# Patient Record
Sex: FEMALE | Race: WHITE | NOT HISPANIC OR LATINO | Employment: OTHER | URBAN - METROPOLITAN AREA
[De-identification: names, ages, dates, MRNs, and addresses within clinical notes are randomized per-mention and may not be internally consistent; named-entity substitution may affect disease eponyms.]

---

## 2017-03-03 ENCOUNTER — TRANSCRIBE ORDERS (OUTPATIENT)
Dept: ADMINISTRATIVE | Facility: HOSPITAL | Age: 62
End: 2017-03-03

## 2017-03-03 DIAGNOSIS — Z12.31 OTHER SCREENING MAMMOGRAM: Primary | ICD-10-CM

## 2017-03-07 ENCOUNTER — HOSPITAL ENCOUNTER (OUTPATIENT)
Dept: MAMMOGRAPHY | Facility: HOSPITAL | Age: 62
Discharge: HOME | End: 2017-03-07
Payer: COMMERCIAL

## 2017-03-07 DIAGNOSIS — Z12.31 OTHER SCREENING MAMMOGRAM: ICD-10-CM

## 2017-03-07 PROCEDURE — G0202 SCR MAMMO BI INCL CAD: HCPCS

## 2017-03-07 PROCEDURE — 77063 BREAST TOMOSYNTHESIS BI: CPT

## 2017-03-09 ENCOUNTER — HOSPITAL ENCOUNTER (OUTPATIENT)
Dept: MAMMOGRAPHY | Facility: HOSPITAL | Age: 62
Discharge: HOME | End: 2017-03-09
Payer: COMMERCIAL

## 2017-03-09 DIAGNOSIS — R92.0 ABNORMAL FINDING ON MAMMOGRAPHY, MICROCALCIFICATION: ICD-10-CM

## 2017-03-09 PROCEDURE — G0206 DX MAMMO INCL CAD UNI: HCPCS

## 2017-04-03 ENCOUNTER — TRANSCRIBE ORDERS (OUTPATIENT)
Dept: ADMINISTRATIVE | Facility: HOSPITAL | Age: 62
End: 2017-04-03

## 2017-04-03 ENCOUNTER — APPOINTMENT (OUTPATIENT)
Dept: LAB | Facility: HOSPITAL | Age: 62
End: 2017-04-03
Payer: COMMERCIAL

## 2017-04-03 DIAGNOSIS — I10 UNSPECIFIED ESSENTIAL HYPERTENSION: ICD-10-CM

## 2017-04-03 DIAGNOSIS — E03.9 UNSPECIFIED HYPOTHYROIDISM: ICD-10-CM

## 2017-04-03 DIAGNOSIS — E11.9 DIABETES MELLITUS WITHOUT COMPLICATION (HCC): ICD-10-CM

## 2017-04-03 DIAGNOSIS — E11.9 DIABETES MELLITUS WITHOUT COMPLICATION (HCC): Primary | ICD-10-CM

## 2017-04-03 DIAGNOSIS — E78.5 OTHER AND UNSPECIFIED HYPERLIPIDEMIA: ICD-10-CM

## 2017-04-03 DIAGNOSIS — E55.9 UNSPECIFIED VITAMIN D DEFICIENCY: ICD-10-CM

## 2017-04-03 LAB
25(OH)D3 SERPL-MCNC: 41.5 NG/ML (ref 30–100)
ALBUMIN SERPL BCP-MCNC: 3.6 G/DL (ref 3.5–5)
ALP SERPL-CCNC: 60 U/L (ref 46–116)
ALT SERPL W P-5'-P-CCNC: 28 U/L (ref 12–78)
ANION GAP SERPL CALCULATED.3IONS-SCNC: 9 MMOL/L (ref 4–13)
AST SERPL W P-5'-P-CCNC: 18 U/L (ref 5–45)
BASOPHILS # BLD AUTO: 0.01 THOUSANDS/ΜL (ref 0–0.1)
BASOPHILS NFR BLD AUTO: 0 % (ref 0–1)
BILIRUB SERPL-MCNC: 0.9 MG/DL (ref 0.2–1)
BUN SERPL-MCNC: 14 MG/DL (ref 5–25)
CALCIUM SERPL-MCNC: 8.8 MG/DL (ref 8.3–10.1)
CHLORIDE SERPL-SCNC: 107 MMOL/L (ref 100–108)
CHOLEST SERPL-MCNC: 195 MG/DL (ref 50–200)
CO2 SERPL-SCNC: 29 MMOL/L (ref 21–32)
CREAT SERPL-MCNC: 0.93 MG/DL (ref 0.6–1.3)
EOSINOPHIL # BLD AUTO: 0.19 THOUSAND/ΜL (ref 0–0.61)
EOSINOPHIL NFR BLD AUTO: 3 % (ref 0–6)
ERYTHROCYTE [DISTWIDTH] IN BLOOD BY AUTOMATED COUNT: 13.1 % (ref 11.6–15.1)
EST. AVERAGE GLUCOSE BLD GHB EST-MCNC: 131 MG/DL
GFR SERPL CREATININE-BSD FRML MDRD: >60 ML/MIN/1.73SQ M
GLUCOSE P FAST SERPL-MCNC: 106 MG/DL (ref 65–99)
HBA1C MFR BLD: 6.2 % (ref 4.2–6.3)
HCT VFR BLD AUTO: 41.8 % (ref 34.8–46.1)
HGB BLD-MCNC: 13.4 G/DL (ref 11.5–15.4)
LDLC SERPL DIRECT ASSAY-MCNC: 100 MG/DL (ref 0–100)
LYMPHOCYTES # BLD AUTO: 1.7 THOUSANDS/ΜL (ref 0.6–4.47)
LYMPHOCYTES NFR BLD AUTO: 29 % (ref 14–44)
MCH RBC QN AUTO: 30.1 PG (ref 26.8–34.3)
MCHC RBC AUTO-ENTMCNC: 32.1 G/DL (ref 31.4–37.4)
MCV RBC AUTO: 94 FL (ref 82–98)
MONOCYTES # BLD AUTO: 0.45 THOUSAND/ΜL (ref 0.17–1.22)
MONOCYTES NFR BLD AUTO: 8 % (ref 4–12)
NEUTROPHILS # BLD AUTO: 3.43 THOUSANDS/ΜL (ref 1.85–7.62)
NEUTS SEG NFR BLD AUTO: 60 % (ref 43–75)
PLATELET # BLD AUTO: 298 THOUSANDS/UL (ref 149–390)
PMV BLD AUTO: 9.6 FL (ref 8.9–12.7)
POTASSIUM SERPL-SCNC: 4 MMOL/L (ref 3.5–5.3)
PROT SERPL-MCNC: 6.7 G/DL (ref 6.4–8.2)
RBC # BLD AUTO: 4.45 MILLION/UL (ref 3.81–5.12)
SODIUM SERPL-SCNC: 145 MMOL/L (ref 136–145)
TSH SERPL DL<=0.05 MIU/L-ACNC: 2.12 UIU/ML (ref 0.36–3.74)
WBC # BLD AUTO: 5.78 THOUSAND/UL (ref 4.31–10.16)

## 2017-04-03 PROCEDURE — 80053 COMPREHEN METABOLIC PANEL: CPT

## 2017-04-03 PROCEDURE — 82465 ASSAY BLD/SERUM CHOLESTEROL: CPT

## 2017-04-03 PROCEDURE — 83036 HEMOGLOBIN GLYCOSYLATED A1C: CPT

## 2017-04-03 PROCEDURE — 36415 COLL VENOUS BLD VENIPUNCTURE: CPT

## 2017-04-03 PROCEDURE — 84443 ASSAY THYROID STIM HORMONE: CPT

## 2017-04-03 PROCEDURE — 85025 COMPLETE CBC W/AUTO DIFF WBC: CPT

## 2017-04-03 PROCEDURE — 83721 ASSAY OF BLOOD LIPOPROTEIN: CPT

## 2017-04-03 PROCEDURE — 82306 VITAMIN D 25 HYDROXY: CPT

## 2017-06-09 ENCOUNTER — TRANSCRIBE ORDERS (OUTPATIENT)
Dept: ADMINISTRATIVE | Facility: HOSPITAL | Age: 62
End: 2017-06-09

## 2017-06-09 DIAGNOSIS — D05.00 LOBULAR CARCINOMA IN SITU (LCIS) OF BREAST, UNSPECIFIED LATERALITY: Primary | ICD-10-CM

## 2017-06-09 DIAGNOSIS — Z78.0 MENOPAUSE: ICD-10-CM

## 2017-06-13 ENCOUNTER — HOSPITAL ENCOUNTER (OUTPATIENT)
Dept: BONE DENSITY | Facility: HOSPITAL | Age: 62
Discharge: HOME | End: 2017-06-13
Payer: COMMERCIAL

## 2017-06-13 DIAGNOSIS — D05.00 LOBULAR CARCINOMA IN SITU (LCIS) OF BREAST, UNSPECIFIED LATERALITY: ICD-10-CM

## 2017-06-13 DIAGNOSIS — Z78.0 MENOPAUSE: ICD-10-CM

## 2017-06-13 PROCEDURE — 77080 DXA BONE DENSITY AXIAL: CPT

## 2017-06-27 ENCOUNTER — TRANSCRIBE ORDERS (OUTPATIENT)
Dept: ADMINISTRATIVE | Facility: HOSPITAL | Age: 62
End: 2017-06-27

## 2017-06-27 ENCOUNTER — APPOINTMENT (OUTPATIENT)
Dept: LAB | Facility: HOSPITAL | Age: 62
End: 2017-06-27
Payer: COMMERCIAL

## 2017-06-27 DIAGNOSIS — E55.9 VITAMIN D DEFICIENCY: Primary | ICD-10-CM

## 2017-06-27 DIAGNOSIS — E55.9 VITAMIN D DEFICIENCY: ICD-10-CM

## 2017-06-27 LAB — 25(OH)D3 SERPL-MCNC: 42.4 NG/ML (ref 30–100)

## 2017-06-27 PROCEDURE — 82306 VITAMIN D 25 HYDROXY: CPT

## 2017-06-27 PROCEDURE — 36415 COLL VENOUS BLD VENIPUNCTURE: CPT

## 2017-08-29 ENCOUNTER — APPOINTMENT (OUTPATIENT)
Dept: LAB | Facility: HOSPITAL | Age: 62
End: 2017-08-29
Payer: COMMERCIAL

## 2017-08-29 ENCOUNTER — TRANSCRIBE ORDERS (OUTPATIENT)
Dept: ADMINISTRATIVE | Facility: HOSPITAL | Age: 62
End: 2017-08-29

## 2017-08-29 DIAGNOSIS — D05.11 INTRADUCTAL CARCINOMA IN SITU OF RIGHT BREAST: ICD-10-CM

## 2017-08-29 DIAGNOSIS — Z79.811 USE OF ANASTROZOLE (ARIMIDEX): ICD-10-CM

## 2017-08-29 DIAGNOSIS — D05.11 INTRADUCTAL CARCINOMA IN SITU OF RIGHT BREAST: Primary | ICD-10-CM

## 2017-08-29 DIAGNOSIS — M85.80 OSTEOPENIA OF THE ELDERLY: ICD-10-CM

## 2017-08-29 LAB
ALBUMIN SERPL BCP-MCNC: 3.6 G/DL (ref 3.5–5)
ALP SERPL-CCNC: 64 U/L (ref 46–116)
ALT SERPL W P-5'-P-CCNC: 27 U/L (ref 12–78)
ANION GAP SERPL CALCULATED.3IONS-SCNC: 10 MMOL/L (ref 4–13)
AST SERPL W P-5'-P-CCNC: 19 U/L (ref 5–45)
BASOPHILS # BLD AUTO: 0.03 THOUSANDS/ΜL (ref 0–0.1)
BASOPHILS NFR BLD AUTO: 1 % (ref 0–1)
BILIRUB SERPL-MCNC: 0.8 MG/DL (ref 0.2–1)
BUN SERPL-MCNC: 19 MG/DL (ref 5–25)
CALCIUM SERPL-MCNC: 8.9 MG/DL (ref 8.3–10.1)
CHLORIDE SERPL-SCNC: 106 MMOL/L (ref 100–108)
CO2 SERPL-SCNC: 28 MMOL/L (ref 21–32)
CREAT SERPL-MCNC: 1 MG/DL (ref 0.6–1.3)
EOSINOPHIL # BLD AUTO: 0.14 THOUSAND/ΜL (ref 0–0.61)
EOSINOPHIL NFR BLD AUTO: 3 % (ref 0–6)
ERYTHROCYTE [DISTWIDTH] IN BLOOD BY AUTOMATED COUNT: 14.5 % (ref 11.6–15.1)
GFR SERPL CREATININE-BSD FRML MDRD: 60 ML/MIN/1.73SQ M
GLUCOSE SERPL-MCNC: 100 MG/DL (ref 65–140)
HCT VFR BLD AUTO: 42.5 % (ref 34.8–46.1)
HGB BLD-MCNC: 14 G/DL (ref 11.5–15.4)
LYMPHOCYTES # BLD AUTO: 0.84 THOUSANDS/ΜL (ref 0.6–4.47)
LYMPHOCYTES NFR BLD AUTO: 17 % (ref 14–44)
MCH RBC QN AUTO: 30.5 PG (ref 26.8–34.3)
MCHC RBC AUTO-ENTMCNC: 32.9 G/DL (ref 31.4–37.4)
MCV RBC AUTO: 93 FL (ref 82–98)
MONOCYTES # BLD AUTO: 0.43 THOUSAND/ΜL (ref 0.17–1.22)
MONOCYTES NFR BLD AUTO: 9 % (ref 4–12)
NEUTROPHILS # BLD AUTO: 3.55 THOUSANDS/ΜL (ref 1.85–7.62)
NEUTS SEG NFR BLD AUTO: 70 % (ref 43–75)
PLATELET # BLD AUTO: 264 THOUSANDS/UL (ref 149–390)
PMV BLD AUTO: 9.2 FL (ref 8.9–12.7)
POTASSIUM SERPL-SCNC: 3.8 MMOL/L (ref 3.5–5.3)
PROT SERPL-MCNC: 7.1 G/DL (ref 6.4–8.2)
RBC # BLD AUTO: 4.59 MILLION/UL (ref 3.81–5.12)
SODIUM SERPL-SCNC: 144 MMOL/L (ref 136–145)
WBC # BLD AUTO: 4.99 THOUSAND/UL (ref 4.31–10.16)

## 2017-08-29 PROCEDURE — 36415 COLL VENOUS BLD VENIPUNCTURE: CPT

## 2017-08-29 PROCEDURE — 80053 COMPREHEN METABOLIC PANEL: CPT

## 2017-08-29 PROCEDURE — 85025 COMPLETE CBC W/AUTO DIFF WBC: CPT

## 2017-10-03 ENCOUNTER — TRANSCRIBE ORDERS (OUTPATIENT)
Dept: ADMINISTRATIVE | Facility: HOSPITAL | Age: 62
End: 2017-10-03

## 2017-10-03 ENCOUNTER — APPOINTMENT (OUTPATIENT)
Dept: LAB | Facility: HOSPITAL | Age: 62
End: 2017-10-03
Payer: COMMERCIAL

## 2017-10-03 DIAGNOSIS — E55.9 VITAMIN D DEFICIENCY: ICD-10-CM

## 2017-10-03 DIAGNOSIS — E78.5 HYPERLIPIDEMIA, UNSPECIFIED HYPERLIPIDEMIA TYPE: ICD-10-CM

## 2017-10-03 DIAGNOSIS — I10 ESSENTIAL HYPERTENSION: Primary | ICD-10-CM

## 2017-10-03 DIAGNOSIS — I10 ESSENTIAL HYPERTENSION: ICD-10-CM

## 2017-10-03 DIAGNOSIS — E03.9 HYPOTHYROIDISM, UNSPECIFIED TYPE: ICD-10-CM

## 2017-10-03 LAB
BASOPHILS # BLD AUTO: 0.02 THOUSANDS/ΜL (ref 0–0.1)
BASOPHILS NFR BLD AUTO: 0 % (ref 0–1)
CHOLEST SERPL-MCNC: 196 MG/DL (ref 50–200)
EOSINOPHIL # BLD AUTO: 0.16 THOUSAND/ΜL (ref 0–0.61)
EOSINOPHIL NFR BLD AUTO: 3 % (ref 0–6)
ERYTHROCYTE [DISTWIDTH] IN BLOOD BY AUTOMATED COUNT: 13.8 % (ref 11.6–15.1)
EST. AVERAGE GLUCOSE BLD GHB EST-MCNC: 131 MG/DL
HBA1C MFR BLD: 6.2 % (ref 4.2–6.3)
HCT VFR BLD AUTO: 41.7 % (ref 34.8–46.1)
HDLC SERPL-MCNC: 75 MG/DL (ref 40–60)
HGB BLD-MCNC: 13.9 G/DL (ref 11.5–15.4)
LDLC SERPL CALC-MCNC: 101 MG/DL (ref 0–100)
LYMPHOCYTES # BLD AUTO: 1.05 THOUSANDS/ΜL (ref 0.6–4.47)
LYMPHOCYTES NFR BLD AUTO: 22 % (ref 14–44)
MCH RBC QN AUTO: 30.3 PG (ref 26.8–34.3)
MCHC RBC AUTO-ENTMCNC: 33.3 G/DL (ref 31.4–37.4)
MCV RBC AUTO: 91 FL (ref 82–98)
MONOCYTES # BLD AUTO: 0.47 THOUSAND/ΜL (ref 0.17–1.22)
MONOCYTES NFR BLD AUTO: 10 % (ref 4–12)
NEUTROPHILS # BLD AUTO: 3.09 THOUSANDS/ΜL (ref 1.85–7.62)
NEUTS SEG NFR BLD AUTO: 65 % (ref 43–75)
PLATELET # BLD AUTO: 263 THOUSANDS/UL (ref 149–390)
PMV BLD AUTO: 9.5 FL (ref 8.9–12.7)
RBC # BLD AUTO: 4.58 MILLION/UL (ref 3.81–5.12)
TRIGL SERPL-MCNC: 102 MG/DL
TSH SERPL DL<=0.05 MIU/L-ACNC: 1.91 UIU/ML (ref 0.36–3.74)
WBC # BLD AUTO: 4.79 THOUSAND/UL (ref 4.31–10.16)

## 2017-10-03 PROCEDURE — 84443 ASSAY THYROID STIM HORMONE: CPT

## 2017-10-03 PROCEDURE — 80061 LIPID PANEL: CPT

## 2017-10-03 PROCEDURE — 85025 COMPLETE CBC W/AUTO DIFF WBC: CPT

## 2017-10-03 PROCEDURE — 36415 COLL VENOUS BLD VENIPUNCTURE: CPT

## 2017-10-03 PROCEDURE — 83036 HEMOGLOBIN GLYCOSYLATED A1C: CPT

## 2017-10-20 ENCOUNTER — ANESTHESIA EVENT (OUTPATIENT)
Dept: PERIOP | Facility: HOSPITAL | Age: 62
End: 2017-10-20
Payer: COMMERCIAL

## 2017-10-23 ENCOUNTER — ANESTHESIA (OUTPATIENT)
Dept: PERIOP | Facility: HOSPITAL | Age: 62
End: 2017-10-23
Payer: COMMERCIAL

## 2017-10-23 ENCOUNTER — HOSPITAL ENCOUNTER (OUTPATIENT)
Facility: HOSPITAL | Age: 62
Setting detail: OUTPATIENT SURGERY
Discharge: HOME | End: 2017-10-23
Attending: COLON & RECTAL SURGERY | Admitting: COLON & RECTAL SURGERY
Payer: COMMERCIAL

## 2017-10-23 VITALS
TEMPERATURE: 97.7 F | HEIGHT: 61 IN | OXYGEN SATURATION: 100 % | HEART RATE: 69 BPM | BODY MASS INDEX: 32.47 KG/M2 | WEIGHT: 172 LBS | SYSTOLIC BLOOD PRESSURE: 142 MMHG | DIASTOLIC BLOOD PRESSURE: 60 MMHG | RESPIRATION RATE: 18 BRPM

## 2017-10-23 RX ORDER — SODIUM CHLORIDE, SODIUM LACTATE, POTASSIUM CHLORIDE, CALCIUM CHLORIDE 600; 310; 30; 20 MG/100ML; MG/100ML; MG/100ML; MG/100ML
125 INJECTION, SOLUTION INTRAVENOUS CONTINUOUS
Status: DISCONTINUED | OUTPATIENT
Start: 2017-10-23 | End: 2017-10-23 | Stop reason: HOSPADM

## 2017-10-23 RX ORDER — PROPOFOL 10 MG/ML
INJECTION, EMULSION INTRAVENOUS AS NEEDED
Status: DISCONTINUED | OUTPATIENT
Start: 2017-10-23 | End: 2017-10-23 | Stop reason: SURG

## 2017-10-23 RX ORDER — SIMVASTATIN 20 MG
20 TABLET ORAL
COMMUNITY
End: 2020-06-08

## 2017-10-23 RX ORDER — MELOXICAM 15 MG/1
15 TABLET ORAL DAILY
COMMUNITY
End: 2020-06-08

## 2017-10-23 RX ORDER — ANASTROZOLE 1 MG/1
1 TABLET ORAL DAILY
COMMUNITY
End: 2020-06-08

## 2017-10-23 RX ORDER — FLUTICASONE PROPIONATE 50 MCG
2 SPRAY, SUSPENSION (ML) NASAL DAILY
COMMUNITY

## 2017-10-23 RX ORDER — AZELASTINE 1 MG/ML
1 SPRAY, METERED NASAL DAILY
COMMUNITY

## 2017-10-23 RX ADMIN — SODIUM CHLORIDE, SODIUM LACTATE, POTASSIUM CHLORIDE, AND CALCIUM CHLORIDE 125 ML/HR: .6; .31; .03; .02 INJECTION, SOLUTION INTRAVENOUS at 12:01

## 2017-10-23 RX ADMIN — PROPOFOL 20 MG: 10 INJECTION, EMULSION INTRAVENOUS at 12:47

## 2017-10-23 RX ADMIN — PROPOFOL 20 MG: 10 INJECTION, EMULSION INTRAVENOUS at 12:46

## 2017-10-23 RX ADMIN — PROPOFOL 120 MG: 10 INJECTION, EMULSION INTRAVENOUS at 12:35

## 2017-10-23 RX ADMIN — PROPOFOL 20 MG: 10 INJECTION, EMULSION INTRAVENOUS at 12:38

## 2017-10-23 RX ADMIN — PROPOFOL 20 MG: 10 INJECTION, EMULSION INTRAVENOUS at 12:41

## 2017-10-23 NOTE — ANESTHESIA PREPROCEDURE EVALUATION
Review of Systems/Medical History  Patient summary reviewed  Chart reviewed  No history of anesthetic complications     Cardiovascular  Negative cardio ROS Exercise tolerance: good,  Hyperlipidemia,    Pulmonary  Negative pulmonary ROS ,   Comment: allergies     GI/Hepatic    Bowel prep  Comment: Hx colon polyps     Negative  ROS        Endo/Other  Negative endo/other ROS      GYN    Breast cancer (right)        Hematology  Negative hematology ROS      Musculoskeletal  Obesity (BMI 32) , Lupus Arthritis,        Neurology  Negative neurology ROS      Psychology   Negative psychology ROS          Physical Exam    Airway    Mallampati score: II  TM Distance: >3 FB       Dental   Comment: Denies loose,     Cardiovascular  Comment: Negative ROS,     Pulmonary      Other Findings      Lab Results   Component Value Date    WBC 4 79 10/03/2017    HGB 13 9 10/03/2017     10/03/2017     Lab Results   Component Value Date     08/29/2017    K 3 8 08/29/2017    BUN 19 08/29/2017    CREATININE 1 00 08/29/2017    GLUCOSE 100 08/29/2017     Lab Results   Component Value Date    HGBA1C 6 2 10/03/2017       Anesthesia Plan  ASA Score- 2       Anesthesia Type- IV sedation with anesthesia with ASA Monitors  Additional Monitors:   Airway Plan:           Induction- intravenous  Informed Consent- Anesthetic plan and risks discussed with patient  I personally reviewed this patient with the CRNA  Discussed and agreed on the Anesthesia Plan with the CRNA  Solo Mota

## 2017-10-23 NOTE — H&P
History and Physical   Colon and Rectal Surgery   Balbir Kurtz 60 y o  female MRN: [id number]  Unit/Bed#: OR [place] Encounter: [id number]  10/23/17   @NOW    No chief complaint on file  History of Present Illness   HPI:  Balbir Kurtz is a 60 y o  female who presents for colorectal cancer screening  Last exam over 5 years ago  Occasional fecal urgency otherwise no complaints  Historical Information   Past Medical History:   Diagnosis Date    Cancer [facility])     R breast      Past Surgical History:   Procedure Laterality Date    BREAST SURGERY Bilateral     lumpectomy    CHOLECYSTECTOMY      COLONOSCOPY      ESOPHAGOGASTRODUODENOSCOPY         Meds/Allergies     Prescriptions Prior to Admission   Medication    anastrozole (ARIMIDEX) 1 mg tablet    azelastine (ASTELIN) 0 1 % nasal spray    Cetirizine HCl (ZYRTEC ALLERGY PO)    Ergocalciferol (VITAMIN D2 PO)    fluticasone (FLONASE) 50 mcg/act nasal spray    meloxicam (MOBIC) 15 mg tablet    simvastatin (ZOCOR) 20 mg tablet    TIMOLOL MALEATE OP         Current Facility-Administered Medications:     lactated ringers infusion, 125 mL/hr, Intravenous, Continuous, Marbella Cifuentes MD, Last Rate: 125 mL/hr at 10/23/17 1201, 125 mL/hr at 10/23/17 1201    Allergies   Allergen Reactions    Other      clear tape _blisters    Bactrim [Sulfamethoxazole-Trimethoprim] Itching         Social History   History   Alcohol Use    Yes     Comment: social     History   Drug use: Unknown     History   Smoking Status    Never Smoker   Smokeless Tobacco    Never Used         Family History: History reviewed  No pertinent family history        Objective     Current Vitals:   Blood Pressure: 139/65 (10/23/17 1152)  Pulse: 65 (10/23/17 1152)  Temperature: 98 6 °F (37 °C) (10/23/17 1152)  Temp Source: Tympanic (10/23/17 1152)  Respirations: 18 (10/23/17 1152)  Height: 5' 1" (154 9 cm) (10/23/17 1152)  Weight - Scale: 78 kg (172 lb) (10/23/17 1152)  SpO2: 97 % (10/23/17 1152)  No intake or output data in the 24 hours ending 10/23/17 1229    Physical Exam:  General:  Resting comfortably in bed   Eyes:Sclera anicteric  ENT: Trachea midline  Pulm:  Symmetric chest raise  No respiratory Distress  CV:  Regular on monitor  Abdomen:  Soft NT ND  Extremities:  No clubbing/ cyanosis/ edema    Lab Results: I have personally reviewed pertinent lab results  Imaging: I have personally reviewed pertinent reports  ASSESSMENT:  Eloise Casas is a 60 y o  female who presents for outpatient colonoscopy  PLAN:  For colonoscopy    Risks/ Benefits reviewed to include but not limited to anesthesia, bleeding, missed lesions, and colonoscopic perforation requiring surgery

## 2017-10-23 NOTE — ANESTHESIA POSTPROCEDURE EVALUATION
Post-Op Assessment Note      CV Status:  Stable    Mental Status:  Alert and awake    Hydration Status:  Euvolemic and stable    PONV Controlled:  None    Airway Patency:  Patent    Post Op Vitals Reviewed: Yes          Staff: Anesthesiologist           /63 (10/23/17 1255)    Temp (!) 97 2 °F (36 2 °C) (10/23/17 1255)    Pulse 69 (10/23/17 1255)   Resp 16 (10/23/17 1255)    SpO2 100 % (10/23/17 1255)

## 2017-10-23 NOTE — OP NOTE
**** GI/ENDOSCOPY REPORT ****     PATIENT NAME: Leticia Bosch ------ VISIT ID:  Patient ID: [id number]   YOB: 1955     INTRODUCTION: Colonoscopy - A 60 female patient presents for an outpatient   Colonoscopy at [facility]  PREVIOUS COLONOSCOPY:     INDICATIONS: Average-risk screening  CONSENT:  The benefits, risks, and alternatives to the procedure were   discussed and informed consent was obtained from the patient  PREPARATION: EKG, pulse, pulse oximetry and blood pressure were monitored   throughout the procedure  The patient was identified by myself both   verbally and by visual inspection of ID band  MEDICATIONS: MAC anesthesia  PROCEDURE:  The endoscope was passed without difficulty through the anus   under direct visualization and advanced to the cecum, confirmed by   ileocecal valve and appendiceal orifice  The scope was withdrawn and the   mucosa was carefully examined  The quality of the preparation was good  Cecal Intubation Time: 3 minutes(s) Scope Withdrawal Time: 11 minutes(s)     RECTAL EXAM: Normal rectal exam      FINDINGS:  The colon appeared to be normal      COMPLICATIONS: There were no complications  IMPRESSIONS: Normal colon  RECOMMENDATIONS: Colonoscopy recommended in 10 years  patient will be sent   a reminder letter  Discharge home when standard parameters are met  Resume   regular diet as tolerated  PATHOLOGY SPECIMENS: No     ESTIMATED BLOOD LOSS: None  PROCEDURE CODES: Colonoscopy     ICD-9 Codes:     ICD-10 Codes: Z12 11 Encounter for screening for malignant neoplasm of   colon     PERFORMED BY: MARC Trevino  on 10/23/2017  Version 1, electronically signed by MARC Rodriguez  on 10/23/2017   at 12:53

## 2017-11-19 ENCOUNTER — HOSPITAL ENCOUNTER (EMERGENCY)
Facility: HOSPITAL | Age: 62
Discharge: HOME | End: 2017-11-20
Attending: EMERGENCY MEDICINE
Payer: COMMERCIAL

## 2017-11-19 ENCOUNTER — APPOINTMENT (EMERGENCY)
Dept: CT IMAGING | Facility: HOSPITAL | Age: 62
End: 2017-11-19
Payer: COMMERCIAL

## 2017-11-19 DIAGNOSIS — R51.9 ACUTE HEADACHE: Primary | ICD-10-CM

## 2017-11-19 DIAGNOSIS — Z86.69 HISTORY OF MIGRAINE HEADACHES: ICD-10-CM

## 2017-11-19 DIAGNOSIS — R19.7 NAUSEA VOMITING AND DIARRHEA: ICD-10-CM

## 2017-11-19 DIAGNOSIS — R11.2 NAUSEA VOMITING AND DIARRHEA: ICD-10-CM

## 2017-11-19 LAB
ALBUMIN SERPL BCP-MCNC: 4 G/DL (ref 3.5–5)
ALP SERPL-CCNC: 75 U/L (ref 46–116)
ALT SERPL W P-5'-P-CCNC: 41 U/L (ref 12–78)
ANION GAP SERPL CALCULATED.3IONS-SCNC: 9 MMOL/L (ref 4–13)
AST SERPL W P-5'-P-CCNC: 24 U/L (ref 5–45)
BACTERIA UR QL AUTO: ABNORMAL /HPF
BASOPHILS # BLD MANUAL: 0 THOUSAND/UL (ref 0–0.1)
BASOPHILS NFR MAR MANUAL: 0 % (ref 0–1)
BILIRUB SERPL-MCNC: 1.2 MG/DL (ref 0.2–1)
BILIRUB UR QL STRIP: NEGATIVE
BUN SERPL-MCNC: 12 MG/DL (ref 5–25)
CALCIUM SERPL-MCNC: 9.8 MG/DL (ref 8.3–10.1)
CHLORIDE SERPL-SCNC: 100 MMOL/L (ref 100–108)
CLARITY UR: CLEAR
CO2 SERPL-SCNC: 31 MMOL/L (ref 21–32)
COLOR UR: YELLOW
CREAT SERPL-MCNC: 0.99 MG/DL (ref 0.6–1.3)
EOSINOPHIL # BLD MANUAL: 0 THOUSAND/UL (ref 0–0.4)
EOSINOPHIL NFR BLD MANUAL: 0 % (ref 0–6)
ERYTHROCYTE [DISTWIDTH] IN BLOOD BY AUTOMATED COUNT: 13.2 % (ref 11.6–15.1)
GFR SERPL CREATININE-BSD FRML MDRD: 61 ML/MIN/1.73SQ M
GLUCOSE SERPL-MCNC: 150 MG/DL (ref 65–140)
GLUCOSE UR STRIP-MCNC: NEGATIVE MG/DL
HCT VFR BLD AUTO: 42.6 % (ref 34.8–46.1)
HGB BLD-MCNC: 14.1 G/DL (ref 11.5–15.4)
HGB UR QL STRIP.AUTO: ABNORMAL
HOLD SPECIMEN: NORMAL
KETONES UR STRIP-MCNC: NEGATIVE MG/DL
LEUKOCYTE ESTERASE UR QL STRIP: NEGATIVE
LYMPHOCYTES # BLD AUTO: 0.41 THOUSAND/UL (ref 0.6–4.47)
LYMPHOCYTES # BLD AUTO: 4 % (ref 14–44)
MCH RBC QN AUTO: 30.5 PG (ref 26.8–34.3)
MCHC RBC AUTO-ENTMCNC: 33.1 G/DL (ref 31.4–37.4)
MCV RBC AUTO: 92 FL (ref 82–98)
MONOCYTES # BLD AUTO: 0.21 THOUSAND/UL (ref 0–1.22)
MONOCYTES NFR BLD: 2 % (ref 4–12)
NEUTROPHILS # BLD MANUAL: 9.67 THOUSAND/UL (ref 1.85–7.62)
NEUTS BAND NFR BLD MANUAL: 3 % (ref 0–8)
NEUTS SEG NFR BLD AUTO: 91 % (ref 43–75)
NITRITE UR QL STRIP: NEGATIVE
NON-SQ EPI CELLS URNS QL MICRO: ABNORMAL /HPF
PH UR STRIP.AUTO: 7 [PH] (ref 4.5–8)
PLATELET # BLD AUTO: 320 THOUSANDS/UL (ref 149–390)
PLATELET BLD QL SMEAR: ADEQUATE
PMV BLD AUTO: 8.9 FL (ref 8.9–12.7)
POTASSIUM SERPL-SCNC: 3.8 MMOL/L (ref 3.5–5.3)
PROT SERPL-MCNC: 8.4 G/DL (ref 6.4–8.2)
PROT UR STRIP-MCNC: NEGATIVE MG/DL
RBC # BLD AUTO: 4.63 MILLION/UL (ref 3.81–5.12)
RBC #/AREA URNS AUTO: ABNORMAL /HPF
SODIUM SERPL-SCNC: 140 MMOL/L (ref 136–145)
SP GR UR STRIP.AUTO: 1.01 (ref 1–1.03)
TOTAL CELLS COUNTED SPEC: 100
UROBILINOGEN UR QL STRIP.AUTO: 0.2 E.U./DL
WBC # BLD AUTO: 10.29 THOUSAND/UL (ref 4.31–10.16)
WBC #/AREA URNS AUTO: ABNORMAL /HPF

## 2017-11-19 PROCEDURE — 85007 BL SMEAR W/DIFF WBC COUNT: CPT | Performed by: EMERGENCY MEDICINE

## 2017-11-19 PROCEDURE — 93005 ELECTROCARDIOGRAM TRACING: CPT | Performed by: EMERGENCY MEDICINE

## 2017-11-19 PROCEDURE — 70498 CT ANGIOGRAPHY NECK: CPT

## 2017-11-19 PROCEDURE — 81001 URINALYSIS AUTO W/SCOPE: CPT | Performed by: EMERGENCY MEDICINE

## 2017-11-19 PROCEDURE — 96361 HYDRATE IV INFUSION ADD-ON: CPT

## 2017-11-19 PROCEDURE — 96375 TX/PRO/DX INJ NEW DRUG ADDON: CPT

## 2017-11-19 PROCEDURE — 70496 CT ANGIOGRAPHY HEAD: CPT

## 2017-11-19 PROCEDURE — 85027 COMPLETE CBC AUTOMATED: CPT | Performed by: EMERGENCY MEDICINE

## 2017-11-19 PROCEDURE — 96374 THER/PROPH/DIAG INJ IV PUSH: CPT

## 2017-11-19 PROCEDURE — 80053 COMPREHEN METABOLIC PANEL: CPT | Performed by: EMERGENCY MEDICINE

## 2017-11-19 RX ORDER — METOCLOPRAMIDE HYDROCHLORIDE 5 MG/ML
5 INJECTION INTRAMUSCULAR; INTRAVENOUS ONCE
Status: COMPLETED | OUTPATIENT
Start: 2017-11-19 | End: 2017-11-19

## 2017-11-19 RX ORDER — SODIUM CHLORIDE 9 MG/ML
125 INJECTION, SOLUTION INTRAVENOUS CONTINUOUS
Status: DISCONTINUED | OUTPATIENT
Start: 2017-11-19 | End: 2017-11-20 | Stop reason: HOSPADM

## 2017-11-19 RX ORDER — KETOROLAC TROMETHAMINE 30 MG/ML
30 INJECTION, SOLUTION INTRAMUSCULAR; INTRAVENOUS ONCE
Status: COMPLETED | OUTPATIENT
Start: 2017-11-19 | End: 2017-11-20

## 2017-11-19 RX ORDER — ONDANSETRON 2 MG/ML
4 INJECTION INTRAMUSCULAR; INTRAVENOUS ONCE
Status: COMPLETED | OUTPATIENT
Start: 2017-11-19 | End: 2017-11-19

## 2017-11-19 RX ADMIN — SODIUM CHLORIDE 2000 ML: 0.9 INJECTION, SOLUTION INTRAVENOUS at 22:00

## 2017-11-19 RX ADMIN — ONDANSETRON 4 MG: 2 INJECTION INTRAMUSCULAR; INTRAVENOUS at 22:03

## 2017-11-19 RX ADMIN — IOHEXOL 100 ML: 350 INJECTION, SOLUTION INTRAVENOUS at 22:49

## 2017-11-19 RX ADMIN — METOCLOPRAMIDE 5 MG: 5 INJECTION, SOLUTION INTRAMUSCULAR; INTRAVENOUS at 22:05

## 2017-11-20 VITALS
WEIGHT: 171.52 LBS | BODY MASS INDEX: 32.38 KG/M2 | OXYGEN SATURATION: 97 % | TEMPERATURE: 98.9 F | RESPIRATION RATE: 15 BRPM | SYSTOLIC BLOOD PRESSURE: 137 MMHG | HEIGHT: 61 IN | DIASTOLIC BLOOD PRESSURE: 63 MMHG | HEART RATE: 77 BPM

## 2017-11-20 PROCEDURE — 99284 EMERGENCY DEPT VISIT MOD MDM: CPT

## 2017-11-20 PROCEDURE — 96375 TX/PRO/DX INJ NEW DRUG ADDON: CPT

## 2017-11-20 PROCEDURE — 96361 HYDRATE IV INFUSION ADD-ON: CPT

## 2017-11-20 RX ORDER — ONDANSETRON 4 MG/1
4 TABLET, FILM COATED ORAL EVERY 4 HOURS PRN
Qty: 10 TABLET | Refills: 0 | Status: SHIPPED | OUTPATIENT
Start: 2017-11-20 | End: 2020-06-08

## 2017-11-20 RX ORDER — NAPROXEN 500 MG/1
500 TABLET ORAL EVERY 8 HOURS PRN
Qty: 20 TABLET | Refills: 0 | Status: SHIPPED | OUTPATIENT
Start: 2017-11-20 | End: 2020-06-08

## 2017-11-20 RX ADMIN — KETOROLAC TROMETHAMINE 30 MG: 30 INJECTION, SOLUTION INTRAMUSCULAR at 00:37

## 2017-11-20 NOTE — DISCHARGE INSTRUCTIONS
Diet restrictions for vomiting and diarrhea  Lots of clear liquidfs  You were given prescriptions to use if needed  Discuss symptoms with prescribing doctor of chemo drug  Acute Headache   WHAT YOU NEED TO KNOW:   An acute headache is pain or discomfort that starts suddenly and gets worse quickly  You may have an acute headache only when you feel stress or eat certain foods  Other acute headache pain can happen every day, and sometimes several times a day  DISCHARGE INSTRUCTIONS:   Seek care immediately if:   · You have severe pain  · You have numbness or weakness on one side of your face or body  · You have a headache that occurs after a blow to the head, a fall, or other trauma  · You have a headache, are forgetful or confused, or have trouble speaking  · You have a headache, stiff neck, and a fever  Contact your healthcare provider if:   · You have a constant headache and are vomiting  · You have a headache each day that does not get better, even after treatment  · You have changes in your headaches, or new symptoms that occur when you have a headache  · You have questions or concerns about your condition or care  Medicines: You may need any of the following:  · Prescription pain medicine  may be given  The medicine your healthcare provider recommends will depend on the kind of headaches you have  You will need to take prescription headache medicines as directed to prevent a problem called rebound headache  These headaches happen with regular use of pain relievers for headache disorders  · NSAIDs , such as ibuprofen, help decrease swelling, pain, and fever  This medicine is available with or without a doctor's order  NSAIDs can cause stomach bleeding or kidney problems in certain people  If you take blood thinner medicine, always ask your healthcare provider if NSAIDs are safe for you  Always read the medicine label and follow directions      · Acetaminophen  decreases pain and fever  It is available without a doctor's order  Ask how much to take and how often to take it  Follow directions  Read the labels of all other medicines you are using to see if they also contain acetaminophen, or ask your doctor or pharmacist  Acetaminophen can cause liver damage if not taken correctly  Do not use more than 3 grams (3,000 milligrams) total of acetaminophen in one day  · Antidepressants  may be given for some kinds of headaches  · Take your medicine as directed  Contact your healthcare provider if you think your medicine is not helping or if you have side effects  Tell him or her if you are allergic to any medicine  Keep a list of the medicines, vitamins, and herbs you take  Include the amounts, and when and why you take them  Bring the list or the pill bottles to follow-up visits  Carry your medicine list with you in case of an emergency  Manage your symptoms:   · Apply heat or ice  on the headache area  Use a heat or ice pack  For an ice pack, you can also put crushed ice in a plastic bag  Cover the pack or bag with a towel before you apply it to your skin  Ice and heat both help decrease pain, and heat also helps decrease muscle spasms  Apply heat for 20 to 30 minutes every 2 hours  Apply ice for 15 to 20 minutes every hour  Apply heat or ice for as long and for as many days as directed  You may alternate heat and ice  · Relax your muscles  Lie down in a comfortable position and close your eyes  Relax your muscles slowly  Start at your toes and work your way up your body  · Keep a record of your headaches  Write down when your headaches start and stop  Include your symptoms and what you were doing when the headache began  Record what you ate or drank for 24 hours before the headache started  Describe the pain and where it hurts  Keep track of what you did to treat your headache and if it worked    Prevent an acute headache:   · Avoid anything that triggers an acute headache  Examples include exposure to chemicals, going to high altitude, or not getting enough sleep  Create a regular sleep routine  Go to sleep at the same time and wake up at the same time each day  Do not use electronic devices before bedtime  These may trigger a headache or prevent you from sleeping well  · Do not smoke  Nicotine and other chemicals in cigarettes and cigars can trigger an acute headache or make it worse  Ask your healthcare provider for information if you currently smoke and need help to quit  E-cigarettes or smokeless tobacco still contain nicotine  Talk to your healthcare provider before you use these products  · Limit alcohol as directed  Alcohol can trigger an acute headache or make it worse  If you have cluster headaches, do not drink alcohol during an episode  For other types of headaches, ask your healthcare provider if it is safe for you to drink alcohol  Ask how much is safe for you to drink, and how often  · Exercise as directed  Exercise can reduce tension and help with headache pain  Aim for 30 minutes of physical activity on most days of the week  Your healthcare provider can help you create an exercise plan  · Eat a variety of healthy foods  Healthy foods include fruits, vegetables, low-fat dairy products, lean meats, fish, whole grains, and cooked beans  Your healthcare provider or dietitian can help you create meals plans if you need to avoid foods that trigger headaches  Follow up with your healthcare provider as directed:  Bring your headache record with you when you see your healthcare provider  Write down your questions so you remember to ask them during your visits  © 2017 [employer] Information is for End User's use only and may not be sold, redistributed or otherwise used for commercial purposes  All illustrations and images included in CareNotes® are the copyrighted property of [employer]  or [employer]    The above information is an  only  It is not intended as medical advice for individual conditions or treatments  Talk to your doctor, nurse or pharmacist before following any medical regimen to see if it is safe and effective for you  Acute Nausea and Vomiting   WHAT YOU NEED TO KNOW:   Acute nausea and vomiting start suddenly, worsen quickly, and last a short time  DISCHARGE INSTRUCTIONS:   Seek care immediately if:   · You see blood in your vomit or your bowel movements  · You have sudden, severe pain in your chest and upper abdomen after hard vomiting or retching  · You have swelling in your neck and chest      · You are dizzy, cold, and thirsty and your eyes and mouth are dry  · You are urinating very little or not at all  · You have muscle weakness, leg cramps, and trouble breathing  · Your heart is beating much faster than normal      · You continue to vomit for more than 48 hours  Contact your healthcare provider if:   · You have frequent dry heaves (vomiting but nothing comes out)  · Your nausea and vomiting does not get better or go away after you use medicine  · You have questions or concerns about your condition or treatment  Medicines: You may need any of the following:  · Medicines  may be given to calm your stomach and stop your vomiting  You may also need medicines to help you feel more relaxed or to stop nausea and vomiting caused by motion sickness  · Gastrointestinal stimulants  are used to help empty your stomach and bowels  This may help decrease nausea and vomiting  · Take your medicine as directed  Contact your healthcare provider if you think your medicine is not helping or if you have side effects  Tell him or her if you are allergic to any medicine  Keep a list of the medicines, vitamins, and herbs you take  Include the amounts, and when and why you take them  Bring the list or the pill bottles to follow-up visits   Carry your medicine list with you in case of an emergency  Prevent or manage acute nausea and vomiting:   · Do not drink alcohol  Alcohol may upset or irritate your stomach  Too much alcohol can also cause acute nausea and vomiting  · Control stress  Headaches due to stress may cause nausea and vomiting  Find ways to relax and manage your stress  Get more rest and sleep  · Drink more liquids as directed  Vomiting can lead to dehydration  It is important to drink more liquids to help replace lost body fluids  Ask your healthcare provider how much liquid to drink each day and which liquids are best for you  Your provider may recommend that you drink an oral rehydration solution (ORS)  ORS contains water, salts, and sugar that are needed to replace the lost body fluids  Ask what kind of ORS to use, how much to drink, and where to get it  · Eat smaller meals, more often  Eat small amounts of food every 2 to 3 hours, even if you are not hungry  Food in your stomach may decrease your nausea  · Talk to your healthcare provider before you take over-the-counter (OTC) medicines  These medicines can cause serious problems if you use certain other medicines, or you have a medical condition  You may have problems if you use too much or use them for longer than the label says  Follow directions on the label carefully  Follow up with your healthcare provider as directed:  Write down your questions so you remember to ask them during your visits  © 2017 [employer] Information is for End User's use only and may not be sold, redistributed or otherwise used for commercial purposes  All illustrations and images included in CareNotes® are the copyrighted property of [employer]  or [employer]  The above information is an  only  It is not intended as medical advice for individual conditions or treatments   Talk to your doctor, nurse or pharmacist before following any medical regimen to see if it is safe and effective for you  Acute Diarrhea   WHAT YOU NEED TO KNOW:   Acute diarrhea starts quickly and lasts a short time, usually 1 to 3 days  It can last up to 2 weeks  You may not be able to control your diarrhea  Acute diarrhea usually stops on its own  DISCHARGE INSTRUCTIONS:   Return to the emergency department if:   · You feel confused  · Your heartbeat is faster than normal      · Your eyes look deeply sunken, or you have no tears when you cry  · You urinate less than usual, or your urine is dark yellow  · You have blood or mucus in your stools  · You have severe abdominal pain  · You are unable to drink any liquids  Contact your healthcare provider if:   · Your symptoms do not get better with treatment  · You have a fever higher than 101 3°F (38 5°C)  · You have trouble eating and drinking because you are vomiting  · You are thirsty or have a dry mouth  · Your diarrhea does not get better in 7 days  · You have questions or concerns about your condition or care  Follow up with your healthcare provider as directed:  Write down your questions so you remember to ask them during your visits  Medicines:  · Diarrhea medicine  is an over-the-counter medicine that helps slow or stop your diarrhea  If you take other medicines, talk to your healthcare provider before you take diarrhea medicine  · Antibiotics  may be given to help treat an infection caused by bacteria  · Antiparasitics  may be given to treat an infection caused by parasites  · Take your medicine as directed  Contact your healthcare provider if you think your medicine is not helping or if you have side effects  Tell him of her if you are allergic to any medicine  Keep a list of the medicines, vitamins, and herbs you take  Include the amounts, and when and why you take them  Bring the list or the pill bottles to follow-up visits   Carry your medicine list with you in case of an emergency  Self-care:   · Drink liquids as directed  Liquids will help prevent dehydration caused by diarrhea  Ask your healthcare provider how much liquid to drink each day and which liquids are best for you  You may need to drink an oral rehydration solution (ORS)  An ORS has the right amounts of water, salts, and sugar you need to replace body fluids  You can buy an ORS at most grocery stores and pharmacies  · Eat foods that are easy to digest   Examples include rice, lentils, cereal, bananas, potatoes, and bread  It also includes some fruits (bananas, melon), well-cooked vegetables, and lean meats  Avoid foods high in fiber, fat, and sugar  Also avoid caffeine, alcohol, dairy, and red meat until your diarrhea is gone  Prevent acute diarrhea:   · Wash your hands often  Use soap and water  Wash your hands before you eat or prepare food  Also wash your hands after you use the bathroom  Use an alcohol-based hand gel when soap and water are not available  · Keep bathroom surfaces clean  This helps prevent the spread of germs that cause acute diarrhea  · Wash fruits and vegetables well before you eat them  This can help remove germs that cause diarrhea  If possible, remove the skin from fruits and vegetables, or cook them well before you eat them  · Cook meat as directed  ¨ Cook ground meat  to 160°F      ¨ Cook ground poultry, whole poultry, or cuts of poultry  to at least 165°F  Remove the meat from heat  Let it stand for 3 minutes before you eat it  ¨ Cook whole cuts of meat other than poultry  to at least 145°F  Remove the meat from heat  Let it stand for 3 minutes before you eat it  · Wash dishes that have touched raw meat with hot water and soap  This includes cutting boards, utensils, dishes, and serving containers  · Place raw or cooked meat in the refrigerator as soon as possible  Bacteria can grow in meat that is left at room temperature too long       · Do not eat raw or undercooked oysters, clams, or mussels  These foods may be contaminated and cause infection  · Drink filtered or treated water only when you travel  Do not put ice in your drinks  Drink bottled water whenever possible  © 2017 [address] [employer] Information is for End User's use only and may not be sold, redistributed or otherwise used for commercial purposes  All illustrations and images included in CareNotes® are the copyrighted property of [employer]  or Herib[employer] Simon  The above information is an  only  It is not intended as medical advice for individual conditions or treatments  Talk to your doctor, nurse or pharmacist before following any medical regimen to see if it is safe and effective for you  Migraine Headache   WHAT YOU NEED TO KNOW:   What is a migraine headache? A migraine is a severe headache  The pain can be so severe that it interferes with your daily activities  A migraine can last a few hours up to several days  The exact cause of migraines is not known  What can trigger a migraine headache? · Stress, eye strain, oversleeping, or not getting enough sleep    · Hormone changes in women from birth control pills, pregnancy, menopause, or during a monthly period    · Skipping meals, going too long without eating, or not drinking enough liquids    · Certain foods or drinks such as chocolate, hard cheese, red wine, or drinks that contain caffeine    · Foods that contain gluten, nitrates, MSG, or artificial sweeteners    · Sunlight, bright or flashing lights, loud noises, smoke, or strong smells    · Heat, humidity, or changes in the weather  What are the warning signs that a migraine headache is about to start?   Warning signs usually start 15 to 60 minutes before the headache:  · Visual changes (auras), such as blurred vision, temporary blind or bright spots, lines, or hallucinations    · Unusual tiredness or frequent yawning    · Tingling in an arm or leg  What are the signs and symptoms of a migraine headache? A migraine headache usually begins as a dull ache around the eye or temple  The pain may get worse with movement  You may also have the following:  · Pain in your head that may increase to the point that you cannot do everyday activities    · Pain on one or both sides of your head    · Throbbing, pulsing, or pounding pain in your head    · Nausea and vomiting    · Sensitivity to light, noise, or smells  How is a migraine headache diagnosed? Your healthcare provider will ask questions about your headaches  Describe the pain and any other symptoms, such as nausea  Tell the provider if you think anything triggered the pain  The provider will also want to know what you ate and drank before the pain started  Tell the provider about any medical conditions you have or that run in your family  Include any recent stressors you have had  You may also need any of the following:  · A neurologic exam  is used to check how your pupils react to light  Your healthcare provider may check your memory, hand grasp, and balance  · CT or MRI pictures  may be taken of your brain  You may be given contrast liquid to help your brain show up better in the pictures  Tell the healthcare provider if you have ever had an allergic reaction to contrast liquid  Do not enter the MRI room with anything metal  Metal can cause serious injury  Tell the healthcare provider if you have any metal in or on your body  How is a migraine headache treated? Migraines cannot be cured  The goal of treatment is to reduce your symptoms  Take medicine as soon as you feel a migraine begin  · Prescription pain medicine  may be given  Do not wait until the pain is severe before you take your medicine  · Migraine medicines  are used to help prevent a migraine or stop it once it starts  · Antinausea medicine  may be given to calm your stomach and to help prevent vomiting   This medicine can also help relieve pain  What can I do to manage my symptoms? · Rest in a dark, quiet room  This will help decrease your pain  Sleep may also help relieve the pain  · Apply ice to decrease pain  Use an ice pack, or put crushed ice in a plastic bag  Cover the ice pack with a towel and place it on your head  Apply ice for 15 to 20 minutes every hour  · Apply heat to decrease pain and muscle spasms  Use a small towel dampened with warm water or a heating pad, or sit in a warm bath  Apply heat on the area for 20 to 30 minutes every 2 hours  You may alternate heat and ice  · Keep a migraine record  Write down when your migraines start and stop  Include your symptoms and what you were doing when a migraine began  Record what you ate or drank for 24 hours before the migraine started  Keep track of what you did to treat your migraine and if it worked  Bring the migraine record with you to visits with your healthcare provider  What can I do to prevent another migraine headache? · Do not smoke  Nicotine and other chemicals in cigarettes and cigars can trigger a migraine or make it worse  Ask your healthcare provider for information if you currently smoke and need help to quit  E-cigarettes or smokeless tobacco still contain nicotine  Talk to your healthcare provider before you use these products  · Do not drink alcohol  Alcohol can trigger a migraine  It can also keep medicines used to treat your migraines from working  · Get regular exercise  Exercise may help prevent migraines  Talk to your healthcare provider about the best exercise plan for you  Try to get at least 30 minutes of exercise on most days  · Manage stress  Stress may trigger a migraine  Learn new ways to relax, such as deep breathing  · Create a sleep schedule  Go to bed and get up at the same times each day  Do not watch television before bed  · Eat regular meals    Include healthy foods such as include fruit, vegetables, whole-grain breads, low-fat dairy products, beans, lean meat, and fish  Do not have food or drinks that trigger your migraines  When should I seek immediate care? · You have a headache that seems different or much worse than your usual migraine headache  · You have a severe headache with a fever or a stiff neck  · You have new problems with speech, vision, balance, or movement  · You feel like you are going to faint, you become confused, or you have a seizure  When should I contact my healthcare provider? · Your migraines interfere with your daily activities  · Your medicines or treatments stop working  · You have questions or concerns about your condition or care  CARE AGREEMENT:   You have the right to help plan your care  Learn about your health condition and how it may be treated  Discuss treatment options with your caregivers to decide what care you want to receive  You always have the right to refuse treatment  The above information is an  only  It is not intended as medical advice for individual conditions or treatments  Talk to your doctor, nurse or pharmacist before following any medical regimen to see if it is safe and effective for you  © 2017 [address] [employer]  Information is for End User's use only and may not be sold, redistributed or otherwise used for commercial purposes  All illustrations and images included in CareNotes® are the copyrighted property of [employer]  or [employer]

## 2017-11-20 NOTE — ED PROVIDER NOTES
History  Chief Complaint   Patient presents with    Headache     Pt c/o severe headache with vomiting since 1500 - states pain began on left side and now is on whole head     Pt gives hx of approx 3PM developed sharp left sided HA- had assoc nausea/vomiting and photophobia  Headache more general now , but still mostly left side and left neck pain radiating to head  Also developed multiple loose stools today  Relates "lost count of number of vomiting episodes  No dark or bloody emesis or diarrhea  No fever/chills  No CP or SOB  No numbness/tingling or loss of function  Pt relates she had migraines when younger and this is somewhat similar , but has not had in years  Pt has had new med "hormone for breast cancer" and then developed general myalgias and was started on Mobic which helped - but last dose was 4-5 days ago  PMH localized right breast CA-has had surgery/radiation /present Arimidex          History provided by:  Patient  Headache   Pain location:  L parietal  Quality:  Sharp  Radiates to:  L neck  Severity at highest:  10/10  Onset quality:  Sudden  Timing:  Constant  Progression:  Worsening  Chronicity:  New  Similar to prior headaches: yes    Context: activity, bright light and loud noise    Context: not stress and not exposure to cold air    Worsened by:  Light and sound  Associated symptoms: diarrhea, nausea, neck pain, photophobia and vomiting    Associated symptoms: no back pain, no blurred vision, no congestion, no cough, no ear pain, no facial pain, no fever, no focal weakness, no hearing loss, no loss of balance, no myalgias, no near-syncope, no neck stiffness, no numbness, no paresthesias, no seizures, no sinus pressure, no sore throat, no syncope and no URI        Prior to Admission Medications   Prescriptions Last Dose Informant Patient Reported? Taking?    Cetirizine HCl (ZYRTEC ALLERGY PO)   Yes No   Sig: Take by mouth daily   Ergocalciferol (VITAMIN D2 PO)   Yes No   Sig: Take by mouth once a week   TIMOLOL MALEATE OP   Yes No   Sig: Apply to eye daily   anastrozole (ARIMIDEX) 1 mg tablet   Yes No   Sig: Take 1 mg by mouth daily   azelastine (ASTELIN) 0 1 % nasal spray   Yes No   Si spray into each nostril daily Use in each nostril as directed   fluticasone (FLONASE) 50 mcg/act nasal spray   Yes No   Si sprays into each nostril daily   meloxicam (MOBIC) 15 mg tablet   Yes No   Sig: Take 15 mg by mouth daily   simvastatin (ZOCOR) 20 mg tablet   Yes No   Sig: Take 20 mg by mouth daily at bedtime      Facility-Administered Medications: None       Past Medical History:   Diagnosis Date    Cancer [facility])     R breast        Past Surgical History:   Procedure Laterality Date    BREAST SURGERY Bilateral     lumpectomy    CHOLECYSTECTOMY      COLONOSCOPY      ESOPHAGOGASTRODUODENOSCOPY      [place] COLONOSCOPY FLX DX W/COLLJ SPEC WHEN PFRMD N/A 10/23/2017    Procedure: COLONOSCOPY;  Surgeon: Doni Dee MD;  Location: [facility];  Service: Colorectal       History reviewed  No pertinent family history  I have reviewed and agree with the history as documented  Social History   Substance Use Topics    Smoking status: Never Smoker    Smokeless tobacco: Never Used    Alcohol use Yes      Comment: rarely        Review of Systems   Constitutional: Positive for activity change  Negative for chills, diaphoresis and fever  HENT: Negative for congestion, dental problem, drooling, ear pain, hearing loss, mouth sores, nosebleeds, sinus pressure, sore throat, trouble swallowing and voice change  Eyes: Positive for photophobia  Negative for blurred vision, redness and itching  Respiratory: Negative for apnea, cough, choking, chest tightness and shortness of breath  Cardiovascular: Negative for chest pain, leg swelling, syncope and near-syncope  Gastrointestinal: Positive for diarrhea, nausea and vomiting  Negative for blood in stool  Genitourinary: Positive for dysuria   Negative for difficulty urinating, flank pain, frequency, hematuria and pelvic pain  Musculoskeletal: Positive for neck pain  Negative for arthralgias, back pain, gait problem, joint swelling, myalgias and neck stiffness  Skin: Negative for pallor, rash and wound  Neurological: Positive for headaches  Negative for tremors, focal weakness, seizures, syncope, facial asymmetry, speech difficulty, numbness, paresthesias and loss of balance  Psychiatric/Behavioral: Negative for confusion, hallucinations and self-injury  All other systems reviewed and are negative  Physical Exam  ED Triage Vitals [11/19/17 2103]   Temperature Pulse Respirations Blood Pressure SpO2   98 9 °F (37 2 °C) 80 18 (!) 181/84 98 %      Temp Source Heart Rate Source Patient Position - Orthostatic VS BP Location FiO2 (%)   Temporal Monitor Lying Left arm --      Pain Score       7           Orthostatic Vital Signs  Vitals:    11/19/17 2103 11/19/17 2218 11/20/17 0121   BP: (!) 181/84 141/64 137/63   Pulse: 80 81 77   Patient Position - Orthostatic VS: Lying Lying Lying       Physical Exam   Constitutional: She is oriented to person, place, and time  She appears well-developed  She is cooperative  Non-toxic appearance  She does not have a sickly appearance  She appears ill  She appears distressed  HENT:   Head: Normocephalic and atraumatic  Right Ear: Hearing, tympanic membrane and ear canal normal    Left Ear: Hearing, tympanic membrane and ear canal normal    Mouth/Throat: Uvula is midline  Mucous membranes are dry and not cyanotic  No trismus in the jaw  No uvula swelling  No oropharyngeal exudate, posterior oropharyngeal edema or posterior oropharyngeal erythema  Eyes: Conjunctivae, EOM and lids are normal  Pupils are equal, round, and reactive to light  Right conjunctiva is not injected  Left conjunctiva is not injected  Neck: Trachea normal, normal range of motion and phonation normal  Neck supple  No JVD present   No Brudzinski's sign and no Kernig's sign noted  Cardiovascular: Normal rate, regular rhythm, intact distal pulses and normal pulses  No extrasystoles are present  No perf edema or calf tenderness   Pulmonary/Chest: Effort normal  No stridor  No respiratory distress  She has no wheezes  She has no rhonchi  She has no rales  She exhibits no tenderness  Abdominal: Soft  Bowel sounds are normal  She exhibits no distension  There is no rigidity, no guarding and no CVA tenderness  Musculoskeletal:        Cervical back: Normal  She exhibits normal pulse  Thoracic back: Normal         Lumbar back: Normal  She exhibits normal pulse  Neurological: She is alert and oriented to person, place, and time  She has normal strength and normal reflexes  No cranial nerve deficit  She displays a negative Romberg sign  Skin: Skin is warm and dry  Capillary refill takes less than 2 seconds  No petechiae and no rash noted  No cyanosis  Psychiatric: She has a normal mood and affect  Her speech is normal and behavior is normal  Thought content normal  Cognition and memory are normal    Vitals reviewed        ED Medications  Medications   sodium chloride 0 9 % bolus 2,000 mL (0 mL Intravenous Stopped 11/20/17 0102)   ondansetron (ZOFRAN) injection 4 mg (4 mg Intravenous Given 11/19/17 2203)   metoclopramide (REGLAN) injection 5 mg (5 mg Intravenous Given 11/19/17 2205)   iohexol (OMNIPAQUE) 350 MG/ML injection (SINGLE-DOSE) 100 mL (100 mL Intravenous Given 11/19/17 2249)   ketorolac (TORADOL) 30 mg/mL injection 30 mg (30 mg Intravenous Given 11/20/17 0037)       Diagnostic Studies  Results Reviewed     Procedure Component Value Units Date/Time    Urine Microscopic [[id number]]  (Abnormal) Collected:  11/19/17 2301    Lab Status:  Final result Specimen:  Urine from Urine, Clean Catch Updated:  11/19/17 2317     RBC, UA 4-10 (A) /hpf      WBC, UA None Seen /hpf      Epithelial Cells Occasional /hpf      Bacteria, UA Occasional /hpf     UA w Reflex to Microscopic w Reflex to Culture [[id number]]  (Abnormal) Collected:  11/19/17 2301    Lab Status:  Final result Specimen:  Urine from Urine, Clean Catch Updated:  11/19/17 2311     Color, UA Yellow     Clarity, UA Clear     Specific Gravity, UA 1 010     pH, UA 7 0     Leukocytes, UA Negative     Nitrite, UA Negative     Protein, UA Negative mg/dl      Glucose, UA Negative mg/dl      Ketones, UA Negative mg/dl      Urobilinogen, UA 0 2 E U /dl      Bilirubin, UA Negative     Blood, UA Small (A)    [place] draw [[id number]] Collected:  11/19/17 2153    Lab Status: In process Specimen:  Blood Updated:  11/19/17 2301    Narrative: The following orders were created for panel order [place] draw  Procedure                               Abnormality         Status                     ---------                               -----------         ------                     [place] Top on BAGV[[id number]]                            Final result               Gold top on NYUG[[id number]]                                  In process                 Green / Black tube on LNSD[[id number]]                        Final result                 Please view results for these tests on the individual orders      Comprehensive metabolic panel [[id number]]  (Abnormal) Collected:  11/19/17 2153    Lab Status:  Final result Specimen:  Blood from Arm, Left Updated:  11/19/17 2215     Sodium 140 mmol/L      Potassium 3 8 mmol/L      Chloride 100 mmol/L      CO2 31 mmol/L      Anion Gap 9 mmol/L      BUN 12 mg/dL      Creatinine 0 99 mg/dL      Glucose 150 (H) mg/dL      Calcium 9 8 mg/dL      AST 24 U/L      ALT 41 U/L      Alkaline Phosphatase 75 U/L      Total Protein 8 4 (H) g/dL      Albumin 4 0 g/dL      Total Bilirubin 1 20 (H) mg/dL      eGFR 61 ml/min/1 73sq m     Narrative:         National Kidney Disease Education Program recommendations are as follows:  GFR calculation is accurate only with a steady state creatinine  Chronic Kidney disease less than 60 ml/min/1 73 sq  meters  Kidney failure less than 15 ml/min/1 73 sq  meters  CBC and differential [[id number]]  (Abnormal) Collected:  11/19/17 2153    Lab Status:  Final result Specimen:  Blood from Arm, Left Updated:  11/19/17 2210     WBC 10 29 (H) Thousand/uL      RBC 4 63 Million/uL      Hemoglobin 14 1 g/dL      Hematocrit 42 6 %      MCV 92 fL      MCH 30 5 pg      MCHC 33 1 g/dL      RDW 13 2 %      MPV 8 9 fL      Platelets 916 Thousands/uL     Narrative: This is an appended report  These results have been appended to a previously verified report                   CTA head and neck with and without contrast    (Results Pending)              Procedures  ECG 12 Lead Documentation  Date/Time: 11/19/2017 10:00 PM  Performed by: Juan Jose Jewell  Authorized by: Mercedes TRAN     ECG reviewed by me, the ED Provider: yes    Patient location:  ED  Interpretation:     Interpretation: non-specific    Rate:     ECG rate assessment: normal    Rhythm:     Rhythm: sinus rhythm    Ectopy:     Ectopy: none             Phone Contacts  ED Phone Contact    ED Course  ED Course as of Nov 20 0418   Sun Nov 19, 2017   2206 WBC: (!) 10 29   2230 Total Bilirubin: (!) 1 20   2230 eGFR: 61   2230 Glucose: (!) 150   2306 Has had some improvement- waiting CT to see to add toradol    2340 Discussed ct "ok" -will add toradol and recheck    2341 Leukocytes, UA: Negative   2341 Nitrite, UA: Negative   Mon Nov 20, 2017   0101 Pain free                                MDM  CritCare Time    Disposition  Final diagnoses:   Acute headache   History of migraine headaches   Nausea vomiting and diarrhea     Time reflects when diagnosis was documented in both MDM as applicable and the Disposition within this note     Time User Action Codes Description Comment    11/20/2017  4:16 AM Jesusita Huber Add [R51] Acute headache     11/20/2017  4:16 AM Jesusita Huber Add [Z86 69] History of migraine headaches     11/20/2017  4:17 AM Israel Cedeno Add [R11 2,  R19 7] Nausea vomiting and diarrhea       ED Disposition     ED Disposition Condition Comment    Discharge  [facility] discharge to home/self care  Condition at discharge: Stable        Follow-up Information     Follow up With Specialties Details Why 0 [facility],  Family Medicine Schedule an appointment as soon as possible for a visit  [address] [place] [address]  [phone]          Discharge Medication List as of 11/20/2017  1:10 AM      START taking these medications    Details   naproxen (NAPROSYN) 500 mg tablet Take 1 tablet by mouth every 8 (eight) hours as needed for moderate pain, Starting Mon 11/20/2017, Print      ondansetron (ZOFRAN) 4 mg tablet Take 1 tablet by mouth every 4 (four) hours as needed for nausea or vomiting, Starting Mon 11/20/2017, Print         CONTINUE these medications which have NOT CHANGED    Details   anastrozole (ARIMIDEX) 1 mg tablet Take 1 mg by mouth daily, Historical Med      azelastine (ASTELIN) 0 1 % nasal spray 1 spray into each nostril daily Use in each nostril as directed, Historical Med      Cetirizine HCl (ZYRTEC ALLERGY PO) Take by mouth daily, Historical Med      Ergocalciferol (VITAMIN D2 PO) Take by mouth once a week, Historical Med      fluticasone (FLONASE) 50 mcg/act nasal spray 2 sprays into each nostril daily, Historical Med      meloxicam (MOBIC) 15 mg tablet Take 15 mg by mouth daily, Historical Med      simvastatin (ZOCOR) 20 mg tablet Take 20 mg by mouth daily at bedtime, Historical Med      TIMOLOL MALEATE OP Apply to eye daily, Historical Med           No discharge procedures on file      ED Provider  Electronically Signed by           Juliana Duane, DO  11/20/17 1455

## 2017-11-20 NOTE — ED NOTES
Patient transported to [address] [place] [address], [address] [place] [address]  11/19/17 1754

## 2017-11-21 LAB
ATRIAL RATE: 77 BPM
P AXIS: 71 DEGREES
PR INTERVAL: 158 MS
QRS AXIS: 34 DEGREES
QRSD INTERVAL: 68 MS
QT INTERVAL: 402 MS
QTC INTERVAL: 454 MS
T WAVE AXIS: 24 DEGREES
VENTRICULAR RATE: 77 BPM

## 2018-01-03 ENCOUNTER — TRANSCRIBE ORDERS (OUTPATIENT)
Dept: ADMINISTRATIVE | Facility: HOSPITAL | Age: 63
End: 2018-01-03

## 2018-01-03 DIAGNOSIS — D05.11 INTRADUCTAL CARCINOMA IN SITU OF RIGHT BREAST: Primary | ICD-10-CM

## 2018-02-01 ENCOUNTER — HOSPITAL ENCOUNTER (OUTPATIENT)
Dept: MAMMOGRAPHY | Facility: HOSPITAL | Age: 63
Discharge: HOME | End: 2018-02-01
Payer: COMMERCIAL

## 2018-02-01 DIAGNOSIS — D05.11 INTRADUCTAL CARCINOMA IN SITU OF RIGHT BREAST: ICD-10-CM

## 2018-02-01 PROCEDURE — 77066 DX MAMMO INCL CAD BI: CPT

## 2018-02-01 PROCEDURE — G0279 TOMOSYNTHESIS, MAMMO: HCPCS

## 2018-04-05 ENCOUNTER — APPOINTMENT (OUTPATIENT)
Dept: LAB | Facility: HOSPITAL | Age: 63
End: 2018-04-05
Payer: COMMERCIAL

## 2018-04-05 ENCOUNTER — TRANSCRIBE ORDERS (OUTPATIENT)
Dept: ADMISSIONS | Facility: HOSPITAL | Age: 63
End: 2018-04-05

## 2018-04-05 DIAGNOSIS — E78.5 HYPERLIPIDEMIA, UNSPECIFIED HYPERLIPIDEMIA TYPE: ICD-10-CM

## 2018-04-05 DIAGNOSIS — Z00.00 ROUTINE MEDICAL EXAM: ICD-10-CM

## 2018-04-05 DIAGNOSIS — E78.5 HYPERLIPIDEMIA, UNSPECIFIED HYPERLIPIDEMIA TYPE: Primary | ICD-10-CM

## 2018-04-05 LAB
25(OH)D3 SERPL-MCNC: 49.3 NG/ML (ref 30–100)
ALBUMIN SERPL BCP-MCNC: 3.6 G/DL (ref 3.5–5)
ALP SERPL-CCNC: 70 U/L (ref 46–116)
ALT SERPL W P-5'-P-CCNC: 25 U/L (ref 12–78)
ANION GAP SERPL CALCULATED.3IONS-SCNC: 8 MMOL/L (ref 4–13)
AST SERPL W P-5'-P-CCNC: 20 U/L (ref 5–45)
BASOPHILS # BLD AUTO: 0.03 THOUSANDS/ΜL (ref 0–0.1)
BASOPHILS NFR BLD AUTO: 1 % (ref 0–1)
BILIRUB SERPL-MCNC: 0.9 MG/DL (ref 0.2–1)
BUN SERPL-MCNC: 12 MG/DL (ref 5–25)
CALCIUM SERPL-MCNC: 9 MG/DL (ref 8.3–10.1)
CHLORIDE SERPL-SCNC: 105 MMOL/L (ref 100–108)
CHOLEST SERPL-MCNC: 209 MG/DL (ref 50–200)
CO2 SERPL-SCNC: 29 MMOL/L (ref 21–32)
CREAT SERPL-MCNC: 0.99 MG/DL (ref 0.6–1.3)
EOSINOPHIL # BLD AUTO: 0.15 THOUSAND/ΜL (ref 0–0.61)
EOSINOPHIL NFR BLD AUTO: 3 % (ref 0–6)
ERYTHROCYTE [DISTWIDTH] IN BLOOD BY AUTOMATED COUNT: 13.5 % (ref 11.6–15.1)
GFR SERPL CREATININE-BSD FRML MDRD: 61 ML/MIN/1.73SQ M
GLUCOSE P FAST SERPL-MCNC: 112 MG/DL (ref 65–99)
HCT VFR BLD AUTO: 41.8 % (ref 34.8–46.1)
HGB BLD-MCNC: 13.9 G/DL (ref 11.5–15.4)
LDLC SERPL DIRECT ASSAY-MCNC: 110 MG/DL (ref 0–100)
LYMPHOCYTES # BLD AUTO: 1.12 THOUSANDS/ΜL (ref 0.6–4.47)
LYMPHOCYTES NFR BLD AUTO: 23 % (ref 14–44)
MCH RBC QN AUTO: 30.6 PG (ref 26.8–34.3)
MCHC RBC AUTO-ENTMCNC: 33.3 G/DL (ref 31.4–37.4)
MCV RBC AUTO: 92 FL (ref 82–98)
MONOCYTES # BLD AUTO: 0.43 THOUSAND/ΜL (ref 0.17–1.22)
MONOCYTES NFR BLD AUTO: 9 % (ref 4–12)
NEUTROPHILS # BLD AUTO: 3.09 THOUSANDS/ΜL (ref 1.85–7.62)
NEUTS SEG NFR BLD AUTO: 64 % (ref 43–75)
PLATELET # BLD AUTO: 295 THOUSANDS/UL (ref 149–390)
PMV BLD AUTO: 8.8 FL (ref 8.9–12.7)
POTASSIUM SERPL-SCNC: 4.1 MMOL/L (ref 3.5–5.3)
PROT SERPL-MCNC: 7 G/DL (ref 6.4–8.2)
RBC # BLD AUTO: 4.54 MILLION/UL (ref 3.81–5.12)
SODIUM SERPL-SCNC: 142 MMOL/L (ref 136–145)
TSH SERPL DL<=0.05 MIU/L-ACNC: 2.74 UIU/ML (ref 0.36–3.74)
WBC # BLD AUTO: 4.82 THOUSAND/UL (ref 4.31–10.16)

## 2018-04-05 PROCEDURE — 82465 ASSAY BLD/SERUM CHOLESTEROL: CPT

## 2018-04-05 PROCEDURE — 36415 COLL VENOUS BLD VENIPUNCTURE: CPT

## 2018-04-05 PROCEDURE — 83721 ASSAY OF BLOOD LIPOPROTEIN: CPT

## 2018-04-05 PROCEDURE — 84443 ASSAY THYROID STIM HORMONE: CPT

## 2018-04-05 PROCEDURE — 82306 VITAMIN D 25 HYDROXY: CPT

## 2018-04-05 PROCEDURE — 85025 COMPLETE CBC W/AUTO DIFF WBC: CPT

## 2018-04-05 PROCEDURE — 80053 COMPREHEN METABOLIC PANEL: CPT

## 2018-08-10 ENCOUNTER — TRANSCRIBE ORDERS (OUTPATIENT)
Dept: ADMINISTRATIVE | Facility: HOSPITAL | Age: 63
End: 2018-08-10

## 2018-08-10 DIAGNOSIS — D05.11 INTRADUCTAL CARCINOMA IN SITU OF RIGHT BREAST: Primary | ICD-10-CM

## 2018-08-16 ENCOUNTER — HOSPITAL ENCOUNTER (OUTPATIENT)
Dept: MAMMOGRAPHY | Facility: HOSPITAL | Age: 63
Discharge: HOME | End: 2018-08-16
Payer: COMMERCIAL

## 2018-08-16 DIAGNOSIS — D05.11 INTRADUCTAL CARCINOMA IN SITU OF RIGHT BREAST: ICD-10-CM

## 2018-08-16 PROCEDURE — 77065 DX MAMMO INCL CAD UNI: CPT

## 2018-08-16 PROCEDURE — G0279 TOMOSYNTHESIS, MAMMO: HCPCS

## 2018-11-10 ENCOUNTER — TRANSCRIBE ORDERS (OUTPATIENT)
Dept: ADMINISTRATIVE | Facility: HOSPITAL | Age: 63
End: 2018-11-10

## 2018-11-10 ENCOUNTER — APPOINTMENT (OUTPATIENT)
Dept: LAB | Facility: HOSPITAL | Age: 63
End: 2018-11-10
Payer: COMMERCIAL

## 2018-11-10 DIAGNOSIS — R73.03 PREDIABETES: ICD-10-CM

## 2018-11-10 DIAGNOSIS — E55.9 VITAMIN D DEFICIENCY: ICD-10-CM

## 2018-11-10 DIAGNOSIS — E78.5 HYPERLIPIDEMIA, UNSPECIFIED HYPERLIPIDEMIA TYPE: ICD-10-CM

## 2018-11-10 DIAGNOSIS — M19.90 ARTHRITIS: ICD-10-CM

## 2018-11-10 DIAGNOSIS — Z00.00 ROUTINE MEDICAL EXAM: ICD-10-CM

## 2018-11-10 DIAGNOSIS — E78.5 HYPERLIPIDEMIA, UNSPECIFIED HYPERLIPIDEMIA TYPE: Primary | ICD-10-CM

## 2018-11-10 LAB
25(OH)D3 SERPL-MCNC: 30.4 NG/ML (ref 30–100)
ALBUMIN SERPL BCP-MCNC: 3.4 G/DL (ref 3.5–5)
ALP SERPL-CCNC: 72 U/L (ref 46–116)
ALT SERPL W P-5'-P-CCNC: 27 U/L (ref 12–78)
ANION GAP SERPL CALCULATED.3IONS-SCNC: 9 MMOL/L (ref 4–13)
AST SERPL W P-5'-P-CCNC: 17 U/L (ref 5–45)
BASOPHILS # BLD AUTO: 0.03 THOUSANDS/ΜL (ref 0–0.1)
BASOPHILS NFR BLD AUTO: 1 % (ref 0–1)
BILIRUB SERPL-MCNC: 1 MG/DL (ref 0.2–1)
BUN SERPL-MCNC: 10 MG/DL (ref 5–25)
CALCIUM SERPL-MCNC: 9 MG/DL (ref 8.3–10.1)
CHLORIDE SERPL-SCNC: 107 MMOL/L (ref 100–108)
CHOLEST SERPL-MCNC: 247 MG/DL (ref 50–200)
CO2 SERPL-SCNC: 27 MMOL/L (ref 21–32)
CREAT SERPL-MCNC: 0.96 MG/DL (ref 0.6–1.3)
CRP SERPL QL: 8.5 MG/L
EOSINOPHIL # BLD AUTO: 0.1 THOUSAND/ΜL (ref 0–0.61)
EOSINOPHIL NFR BLD AUTO: 2 % (ref 0–6)
ERYTHROCYTE [DISTWIDTH] IN BLOOD BY AUTOMATED COUNT: 14.1 % (ref 11.6–15.1)
ERYTHROCYTE [SEDIMENTATION RATE] IN BLOOD: 31 MM/HOUR (ref 0–20)
EST. AVERAGE GLUCOSE BLD GHB EST-MCNC: 143 MG/DL
GFR SERPL CREATININE-BSD FRML MDRD: 63 ML/MIN/1.73SQ M
GLUCOSE P FAST SERPL-MCNC: 112 MG/DL (ref 65–99)
HBA1C MFR BLD: 6.6 % (ref 4.2–6.3)
HCT VFR BLD AUTO: 40.1 % (ref 34.8–46.1)
HDLC SERPL-MCNC: 69 MG/DL (ref 40–60)
HGB BLD-MCNC: 13.1 G/DL (ref 11.5–15.4)
IMM GRANULOCYTES # BLD AUTO: 0.01 THOUSAND/UL (ref 0–0.2)
IMM GRANULOCYTES NFR BLD AUTO: 0 % (ref 0–2)
LDLC SERPL CALC-MCNC: 156 MG/DL (ref 0–100)
LYMPHOCYTES # BLD AUTO: 1.16 THOUSANDS/ΜL (ref 0.6–4.47)
LYMPHOCYTES NFR BLD AUTO: 25 % (ref 14–44)
MCH RBC QN AUTO: 30.3 PG (ref 26.8–34.3)
MCHC RBC AUTO-ENTMCNC: 32.7 G/DL (ref 31.4–37.4)
MCV RBC AUTO: 93 FL (ref 82–98)
MONOCYTES # BLD AUTO: 0.43 THOUSAND/ΜL (ref 0.17–1.22)
MONOCYTES NFR BLD AUTO: 9 % (ref 4–12)
NEUTROPHILS # BLD AUTO: 2.92 THOUSANDS/ΜL (ref 1.85–7.62)
NEUTS SEG NFR BLD AUTO: 63 % (ref 43–75)
NONHDLC SERPL-MCNC: 178 MG/DL
NRBC BLD AUTO-RTO: 0 /100 WBCS
PLATELET # BLD AUTO: 291 THOUSANDS/UL (ref 149–390)
PMV BLD AUTO: 9.4 FL (ref 8.9–12.7)
POTASSIUM SERPL-SCNC: 3.8 MMOL/L (ref 3.5–5.3)
PROT SERPL-MCNC: 6.7 G/DL (ref 6.4–8.2)
RBC # BLD AUTO: 4.33 MILLION/UL (ref 3.81–5.12)
SODIUM SERPL-SCNC: 143 MMOL/L (ref 136–145)
TRIGL SERPL-MCNC: 111 MG/DL
WBC # BLD AUTO: 4.65 THOUSAND/UL (ref 4.31–10.16)

## 2018-11-10 PROCEDURE — 80053 COMPREHEN METABOLIC PANEL: CPT

## 2018-11-10 PROCEDURE — 83036 HEMOGLOBIN GLYCOSYLATED A1C: CPT

## 2018-11-10 PROCEDURE — 80061 LIPID PANEL: CPT

## 2018-11-10 PROCEDURE — 36415 COLL VENOUS BLD VENIPUNCTURE: CPT

## 2018-11-10 PROCEDURE — 82306 VITAMIN D 25 HYDROXY: CPT

## 2018-11-10 PROCEDURE — 86140 C-REACTIVE PROTEIN: CPT

## 2018-11-10 PROCEDURE — 85652 RBC SED RATE AUTOMATED: CPT

## 2018-11-10 PROCEDURE — 85025 COMPLETE CBC W/AUTO DIFF WBC: CPT

## 2018-12-07 NOTE — PROGRESS NOTES
Laurie Oglesby was seen today for diarrhea  Diagnoses and all orders for this visit:    Colitis  -     budesonide (ENTOCORT EC) 3 MG capsule; Take 3 capsules (9 mg total) by mouth daily       Change in bowel habits  Patient presents for evaluation of approximately 1 year loose bowel movements  Patient's last colonoscopy was slightly greater than a year ago  She notes this happen when she started using diclofenac as the anti inflammatory medication  Recent labs show mild elevation in sed rate and CR P  This is unfortunate nonspecific and may be related to her underlying rheumatologic condition  Given history there is a chance she has some degree of microscopic colitis  She notes some past improvements on Pepto-Bismol and Kaopectate  Unfortunately these have not been long-lasting  We discussed the best way to diagnose microscopic colitis is with colonoscopy and random tissue biopsy  As this was done approximately 1 year ago without biopsies patient is not enthusiastic to have repeat colonoscopy at this point in time  As such we discussed empiric treatment with budesonide is an anti inflammatory medication is reasonable  We will plan will be for follow-up in 2-3 months  If no improvement would recommend colonoscopy at that point in time  JULIO   Linda Brand is here today for evaluation of diarrhea  She states the diarrhea started after starting Diclofenac 1 year ago  She states she has about 3-4 bowel movements with loose stool and mucus  She also complains of right upper abdominal pain that comes and goes  She denies fever, rectal pain, or nausea/vomting  She had a colonoscopy on 10/23/17, with normal results        Past Medical History:   Diagnosis Date    Cancer [facility])     R breast      Past Surgical History:   Procedure Laterality Date    BREAST SURGERY Bilateral     lumpectomy    CHOLECYSTECTOMY      COLONOSCOPY      ESOPHAGOGASTRODUODENOSCOPY      [place] COLONOSCOPY FLX DX W/COLLJ SPEC WHEN PFRMD N/A 10/23/2017    Procedure: COLONOSCOPY;  Surgeon: Griselda Garcia MD;  Location: [facility];  Service: Colorectal       Current Outpatient Prescriptions:     anastrozole (ARIMIDEX) 1 mg tablet, Take 1 mg by mouth daily, Disp: , Rfl:     azelastine (ASTELIN) 0 1 % nasal spray, 1 spray into each nostril daily Use in each nostril as directed, Disp: , Rfl:     budesonide (ENTOCORT EC) 3 MG capsule, Take 3 capsules (9 mg total) by mouth daily, Disp: 90 capsule, Rfl: 2    Calcium Ascorbate 500 MG TABS, Take 500 mg by mouth, Disp: , Rfl:     Cetirizine HCl (ZYRTEC ALLERGY PO), Take by mouth daily, Disp: , Rfl:     Ergocalciferol (VITAMIN D2 PO), Take by mouth once a week, Disp: , Rfl:     fluticasone (FLONASE) 50 mcg/act nasal spray, 2 sprays into each nostril daily, Disp: , Rfl:     meloxicam (MOBIC) 15 mg tablet, Take 15 mg by mouth daily, Disp: , Rfl:     naproxen (NAPROSYN) 500 mg tablet, Take 1 tablet by mouth every 8 (eight) hours as needed for moderate pain, Disp: 20 tablet, Rfl: 0    ondansetron (ZOFRAN) 4 mg tablet, Take 1 tablet by mouth every 4 (four) hours as needed for nausea or vomiting, Disp: 10 tablet, Rfl: 0    simvastatin (ZOCOR) 20 mg tablet, Take 20 mg by mouth daily at bedtime, Disp: , Rfl:     TIMOLOL MALEATE OP, Apply to eye daily, Disp: , Rfl:   Allergies as of 12/10/2018 - Reviewed 11/21/2017   Allergen Reaction Noted    Bactrim [sulfamethoxazole-trimethoprim] Anaphylaxis and Swelling 11/25/2015    Other  10/23/2017    Pollen extract Itching 04/11/2017    Wound dressings Rash 06/23/2017     Review of Systems  There were no vitals filed for this visit    Physical Exam

## 2018-12-10 ENCOUNTER — OFFICE VISIT (OUTPATIENT)
Dept: SURGERY | Facility: HOSPITAL | Age: 63
End: 2018-12-10
Payer: COMMERCIAL

## 2018-12-10 VITALS — BODY MASS INDEX: 33.42 KG/M2 | HEIGHT: 61 IN | WEIGHT: 177 LBS

## 2018-12-10 DIAGNOSIS — K52.9 COLITIS: Primary | ICD-10-CM

## 2018-12-10 PROBLEM — R19.4 CHANGE IN BOWEL HABITS: Status: ACTIVE | Noted: 2018-12-10

## 2018-12-10 PROCEDURE — 99213 OFFICE O/P EST LOW 20 MIN: CPT | Performed by: COLON & RECTAL SURGERY

## 2018-12-10 RX ORDER — ASCORBATE CALCIUM 500 MG
500 TABLET ORAL 2 TIMES DAILY
COMMUNITY

## 2018-12-10 RX ORDER — BUDESONIDE 3 MG/1
9 CAPSULE, COATED PELLETS ORAL DAILY
Qty: 90 CAPSULE | Refills: 2 | Status: SHIPPED | OUTPATIENT
Start: 2018-12-10 | End: 2019-02-11

## 2018-12-10 NOTE — ASSESSMENT & PLAN NOTE
Patient presents for evaluation of approximately 1 year loose bowel movements  Patient's last colonoscopy was slightly greater than a year ago  She notes this happen when she started using diclofenac as the anti inflammatory medication  Recent labs show mild elevation in sed rate and CR P  This is unfortunate nonspecific and may be related to her underlying rheumatologic condition  Given history there is a chance she has some degree of microscopic colitis  She notes some past improvements on Pepto-Bismol and Kaopectate  Unfortunately these have not been long-lasting  We discussed the best way to diagnose microscopic colitis is with colonoscopy and random tissue biopsy  As this was done approximately 1 year ago without biopsies patient is not enthusiastic to have repeat colonoscopy at this point in time  As such we discussed empiric treatment with budesonide is an anti inflammatory medication is reasonable  We will plan will be for follow-up in 2-3 months  If no improvement would recommend colonoscopy at that point in time

## 2019-01-13 ENCOUNTER — TRANSCRIBE ORDERS (OUTPATIENT)
Dept: ADMINISTRATIVE | Facility: HOSPITAL | Age: 64
End: 2019-01-13

## 2019-01-13 ENCOUNTER — APPOINTMENT (OUTPATIENT)
Dept: LAB | Facility: HOSPITAL | Age: 64
End: 2019-01-13
Payer: COMMERCIAL

## 2019-01-13 DIAGNOSIS — M25.542 ARTHRALGIA OF LEFT HAND: ICD-10-CM

## 2019-01-13 DIAGNOSIS — R79.89 OTHER SPECIFIED ABNORMAL FINDINGS OF BLOOD CHEMISTRY: Primary | ICD-10-CM

## 2019-01-13 PROCEDURE — 86430 RHEUMATOID FACTOR TEST QUAL: CPT

## 2019-01-13 PROCEDURE — 86038 ANTINUCLEAR ANTIBODIES: CPT

## 2019-01-13 PROCEDURE — 36415 COLL VENOUS BLD VENIPUNCTURE: CPT

## 2019-01-13 PROCEDURE — 86200 CCP ANTIBODY: CPT

## 2019-01-13 PROCEDURE — 86039 ANTINUCLEAR ANTIBODIES (ANA): CPT

## 2019-01-14 LAB — RHEUMATOID FACT SER QL LA: NEGATIVE

## 2019-01-16 LAB
ANA HOMOGEN SER QL IF: NORMAL
ANA HOMOGEN TITR SER: NORMAL {TITER}
CCP IGA+IGG SERPL IA-ACNC: 22 UNITS (ref 0–19)
RYE IGE QN: POSITIVE

## 2019-01-17 ENCOUNTER — TRANSCRIBE ORDERS (OUTPATIENT)
Dept: ADMINISTRATIVE | Facility: HOSPITAL | Age: 64
End: 2019-01-17

## 2019-01-17 DIAGNOSIS — Z12.39 SCREENING BREAST EXAMINATION: ICD-10-CM

## 2019-01-17 DIAGNOSIS — R79.89 OTHER SPECIFIED ABNORMAL FINDINGS OF BLOOD CHEMISTRY: Primary | ICD-10-CM

## 2019-01-18 ENCOUNTER — APPOINTMENT (OUTPATIENT)
Dept: LAB | Facility: HOSPITAL | Age: 64
End: 2019-01-18
Payer: COMMERCIAL

## 2019-01-18 DIAGNOSIS — R79.89 OTHER SPECIFIED ABNORMAL FINDINGS OF BLOOD CHEMISTRY: ICD-10-CM

## 2019-01-18 LAB
BACTERIA UR QL AUTO: ABNORMAL /HPF
BILIRUB UR QL STRIP: NEGATIVE
C3 SERPL-MCNC: 170 MG/DL (ref 90–180)
C4 SERPL-MCNC: 46 MG/DL (ref 10–40)
CLARITY UR: CLEAR
COLOR UR: YELLOW
CREAT UR-MCNC: 108 MG/DL
CRP SERPL QL: 4.8 MG/L
ERYTHROCYTE [SEDIMENTATION RATE] IN BLOOD: 23 MM/HOUR (ref 0–20)
GLUCOSE UR STRIP-MCNC: NEGATIVE MG/DL
HGB UR QL STRIP.AUTO: ABNORMAL
KETONES UR STRIP-MCNC: NEGATIVE MG/DL
LEUKOCYTE ESTERASE UR QL STRIP: NEGATIVE
NITRITE UR QL STRIP: NEGATIVE
NON-SQ EPI CELLS URNS QL MICRO: ABNORMAL /HPF
PH UR STRIP.AUTO: 6 [PH] (ref 4.5–8)
PROT UR STRIP-MCNC: NEGATIVE MG/DL
PROT UR-MCNC: 16 MG/DL
PROT/CREAT UR: 0.15 MG/G{CREAT} (ref 0–0.1)
RBC #/AREA URNS AUTO: ABNORMAL /HPF
SP GR UR STRIP.AUTO: 1.02 (ref 1–1.03)
UROBILINOGEN UR QL STRIP.AUTO: 0.2 E.U./DL
WBC #/AREA URNS AUTO: ABNORMAL /HPF

## 2019-01-18 PROCEDURE — 82570 ASSAY OF URINE CREATININE: CPT | Performed by: SURGERY

## 2019-01-18 PROCEDURE — 86235 NUCLEAR ANTIGEN ANTIBODY: CPT

## 2019-01-18 PROCEDURE — 86225 DNA ANTIBODY NATIVE: CPT

## 2019-01-18 PROCEDURE — 85652 RBC SED RATE AUTOMATED: CPT

## 2019-01-18 PROCEDURE — 36415 COLL VENOUS BLD VENIPUNCTURE: CPT

## 2019-01-18 PROCEDURE — 81001 URINALYSIS AUTO W/SCOPE: CPT | Performed by: SURGERY

## 2019-01-18 PROCEDURE — 86160 COMPLEMENT ANTIGEN: CPT

## 2019-01-18 PROCEDURE — 84156 ASSAY OF PROTEIN URINE: CPT | Performed by: SURGERY

## 2019-01-18 PROCEDURE — 86140 C-REACTIVE PROTEIN: CPT

## 2019-01-21 LAB
DSDNA AB SER-ACNC: 1 IU/ML (ref 0–9)
ENA RNP AB SER-ACNC: <0.2 AI (ref 0–0.9)
ENA SM AB SER-ACNC: <0.2 AI (ref 0–0.9)
ENA SS-A AB SER-ACNC: <0.2 AI (ref 0–0.9)
ENA SS-B AB SER-ACNC: <0.2 AI (ref 0–0.9)

## 2019-01-28 ENCOUNTER — APPOINTMENT (OUTPATIENT)
Dept: LAB | Facility: HOSPITAL | Age: 64
End: 2019-01-28
Payer: COMMERCIAL

## 2019-01-28 ENCOUNTER — TRANSCRIBE ORDERS (OUTPATIENT)
Dept: ADMINISTRATIVE | Facility: HOSPITAL | Age: 64
End: 2019-01-28

## 2019-01-28 DIAGNOSIS — R31.9 HEMATURIA, UNSPECIFIED TYPE: Primary | ICD-10-CM

## 2019-01-28 DIAGNOSIS — R31.9 HEMATURIA, UNSPECIFIED TYPE: ICD-10-CM

## 2019-01-28 LAB
BACTERIA UR QL AUTO: ABNORMAL /HPF
BILIRUB UR QL STRIP: NEGATIVE
CLARITY UR: CLEAR
COLOR UR: YELLOW
GLUCOSE UR STRIP-MCNC: NEGATIVE MG/DL
HGB UR QL STRIP.AUTO: ABNORMAL
KETONES UR STRIP-MCNC: NEGATIVE MG/DL
LEUKOCYTE ESTERASE UR QL STRIP: NEGATIVE
NITRITE UR QL STRIP: NEGATIVE
NON-SQ EPI CELLS URNS QL MICRO: ABNORMAL /HPF
PH UR STRIP.AUTO: 6.5 [PH] (ref 4.5–8)
PROT UR STRIP-MCNC: NEGATIVE MG/DL
RBC #/AREA URNS AUTO: ABNORMAL /HPF
SP GR UR STRIP.AUTO: 1.01 (ref 1–1.03)
UROBILINOGEN UR QL STRIP.AUTO: 0.2 E.U./DL
WBC #/AREA URNS AUTO: ABNORMAL /HPF

## 2019-01-28 PROCEDURE — 81001 URINALYSIS AUTO W/SCOPE: CPT

## 2019-01-28 PROCEDURE — 87086 URINE CULTURE/COLONY COUNT: CPT

## 2019-01-29 LAB — BACTERIA UR CULT: NORMAL

## 2019-02-04 ENCOUNTER — HOSPITAL ENCOUNTER (OUTPATIENT)
Dept: MAMMOGRAPHY | Facility: HOSPITAL | Age: 64
Discharge: HOME | End: 2019-02-04
Payer: COMMERCIAL

## 2019-02-04 VITALS — WEIGHT: 176 LBS | BODY MASS INDEX: 33.23 KG/M2 | HEIGHT: 61 IN

## 2019-02-04 DIAGNOSIS — Z85.3 HISTORY OF BREAST CANCER: ICD-10-CM

## 2019-02-04 PROCEDURE — G0279 TOMOSYNTHESIS, MAMMO: HCPCS

## 2019-02-04 PROCEDURE — 77066 DX MAMMO INCL CAD BI: CPT

## 2019-02-05 ENCOUNTER — TELEPHONE (OUTPATIENT)
Dept: UROLOGY | Facility: MEDICAL CENTER | Age: 64
End: 2019-02-05

## 2019-02-05 NOTE — TELEPHONE ENCOUNTER
Complaint/Diagnosis:Micro Hematuria    Insurance:[employer] PPO    History of Cancer:Breast    Previous urologist:no    Outside testing[facility]where[facility]    If yes,what kind:[facility]    Records requested/where:[facility]    Preferred location:[place]

## 2019-02-08 NOTE — PROGRESS NOTES
There are no diagnoses linked to this encounter  No problem-specific Assessment & Plan notes found for this encounter  HPI   Marya Espinal is here today for evaluation of colitis    She is currently on budesonide 3 MG capsule 3 capsules daily  She continues to have fecal urgency in the morning  She has about 4-5 bowel movements loose to formed stool  She denies rectal bleeding, abdominal pain, or mucus in the stool        Past Medical History:   Diagnosis Date    Breast cancer ([address] ) 03/01/2017    right     Cancer [facility])     R breast     History of radiation therapy 08/01/2017    right breast     Past Surgical History:   Procedure Laterality Date    BREAST CYST EXCISION Left 1976    benign    BREAST LUMPECTOMY Right 03/01/2017    BREAST SURGERY Bilateral     lumpectomy    CHOLECYSTECTOMY      COLONOSCOPY      ESOPHAGOGASTRODUODENOSCOPY      [place] COLONOSCOPY FLX DX W/COLLJ SPEC WHEN PFRMD N/A 10/23/2017    Procedure: COLONOSCOPY;  Surgeon: Lashawn Parish MD;  Location: [facility];  Service: Colorectal       Current Outpatient Prescriptions:     anastrozole (ARIMIDEX) 1 mg tablet, Take 1 mg by mouth daily, Disp: , Rfl:     azelastine (ASTELIN) 0 1 % nasal spray, 1 spray into each nostril daily Use in each nostril as directed, Disp: , Rfl:     budesonide (ENTOCORT EC) 3 MG capsule, Take 3 capsules (9 mg total) by mouth daily, Disp: 90 capsule, Rfl: 2    Calcium Ascorbate 500 MG TABS, Take 500 mg by mouth, Disp: , Rfl:     Cetirizine HCl (ZYRTEC ALLERGY PO), Take by mouth daily, Disp: , Rfl:     Ergocalciferol (VITAMIN D2 PO), Take by mouth once a week, Disp: , Rfl:     fluticasone (FLONASE) 50 mcg/act nasal spray, 2 sprays into each nostril daily, Disp: , Rfl:     meloxicam (MOBIC) 15 mg tablet, Take 15 mg by mouth daily, Disp: , Rfl:     naproxen (NAPROSYN) 500 mg tablet, Take 1 tablet by mouth every 8 (eight) hours as needed for moderate pain, Disp: 20 tablet, Rfl: 0    ondansetron (ZOFRAN) 4 mg tablet, Take 1 tablet by mouth every 4 (four) hours as needed for nausea or vomiting, Disp: 10 tablet, Rfl: 0    simvastatin (ZOCOR) 20 mg tablet, Take 20 mg by mouth daily at bedtime, Disp: , Rfl:     TIMOLOL MALEATE OP, Apply to eye daily, Disp: , Rfl:   Allergies as of 02/11/2019 - Reviewed 02/04/2019   Allergen Reaction Noted    Bactrim [sulfamethoxazole-trimethoprim] Anaphylaxis and Swelling 11/25/2015    Other  10/23/2017    Pollen extract Itching 04/11/2017    Wound dressings Rash 06/23/2017     Review of Systems   Gastrointestinal: Positive for diarrhea  Negative for abdominal distention  There were no vitals filed for this visit  Physical Exam   Constitutional: She appears well-developed and well-nourished  HENT:   Head: Normocephalic and atraumatic  Eyes: Pupils are equal, round, and reactive to light  EOM are normal    Neck: Normal range of motion  Neck supple  Psychiatric: She has a normal mood and affect   Her behavior is normal  Judgment normal

## 2019-02-11 ENCOUNTER — OFFICE VISIT (OUTPATIENT)
Dept: SURGERY | Facility: HOSPITAL | Age: 64
End: 2019-02-11
Payer: COMMERCIAL

## 2019-02-11 VITALS — BODY MASS INDEX: 33.44 KG/M2 | WEIGHT: 177 LBS

## 2019-02-11 DIAGNOSIS — K52.9 COLITIS: ICD-10-CM

## 2019-02-11 DIAGNOSIS — R19.4 CHANGE IN BOWEL HABITS: Primary | ICD-10-CM

## 2019-02-11 PROCEDURE — 99213 OFFICE O/P EST LOW 20 MIN: CPT | Performed by: COLON & RECTAL SURGERY

## 2019-02-11 RX ORDER — BUDESONIDE 3 MG/1
9 CAPSULE, COATED PELLETS ORAL DAILY
Qty: 90 CAPSULE | Refills: 2 | Status: SHIPPED | OUTPATIENT
Start: 2019-02-11 | End: 2019-03-08 | Stop reason: SDUPTHER

## 2019-02-11 NOTE — ASSESSMENT & PLAN NOTE
Patient presents in follow-up of change in bowel habits  We discussed that we had given her provisional diagnosis based upon symptoms of microscopic colitis  We discussed the true diagnosis of this regard colonoscopy we for gone back considered she had a few months before the symptoms developed  She notes that she has had some heartburn on budesonide with this is well controlled on Mylanta  Her bowel movements have overall improved but she still has increased frequency particularly in the morning  We discussed further care  We can either continue as is and continue treatment for 6-12 months to monitor response  Or we can entertain colonoscopy to confirm deny our clinical diagnosis  She does not wish for colonoscopy at this point in time  We will continue budesonide for least another 3 months and then have her follow up at that point in time

## 2019-02-11 NOTE — LETTER
February 11, 2019     Piat Montana DO  [address] [address]    Patient: Tomás Buitrago   YOB: 1955   Date of Visit: 2/11/2019       Dear Dr Denise Ace: Thank you for referring Wiley Montelongo to me for evaluation  Below are my notes for this consultation  If you have questions, please do not hesitate to call me  I look forward to following your patient along with you  Sincerely,        Mann Beltran MD        CC: No Recipients  Mann Beltran MD  2/11/2019 11:13 AM  Incomplete  There are no diagnoses linked to this encounter  No problem-specific Assessment & Plan notes found for this encounter  HPI   Tomás Buitrago is here today for evaluation of colitis    She is currently on budesonide 3 MG capsule 3 capsules daily  She continues to have fecal urgency in the morning  She has about 4-5 bowel movements loose to formed stool  She denies rectal bleeding, abdominal pain, or mucus in the stool        Past Medical History:   Diagnosis Date    Breast cancer ([facility] [address] ) 03/01/2017    right     Cancer [facility])     R breast     History of radiation therapy 08/01/2017    right breast     Past Surgical History:   Procedure Laterality Date    BREAST CYST EXCISION Left 1976    benign    BREAST LUMPECTOMY Right 03/01/2017    BREAST SURGERY Bilateral     lumpectomy    CHOLECYSTECTOMY      COLONOSCOPY      ESOPHAGOGASTRODUODENOSCOPY      [place] COLONOSCOPY FLX DX W/COLLJ SPEC WHEN PFRMD N/A 10/23/2017    Procedure: COLONOSCOPY;  Surgeon: Vandana Lake MD;  Location: [facility];  Service: Colorectal       Current Outpatient Prescriptions:     anastrozole (ARIMIDEX) 1 mg tablet, Take 1 mg by mouth daily, Disp: , Rfl:     azelastine (ASTELIN) 0 1 % nasal spray, 1 spray into each nostril daily Use in each nostril as directed, Disp: , Rfl:     budesonide (ENTOCORT EC) 3 MG capsule, Take 3 capsules (9 mg total) by mouth daily, Disp: 90 capsule, Rfl: 2    Calcium Ascorbate 500 MG TABS, Take 500 mg by mouth, Disp: , Rfl:     Cetirizine HCl (ZYRTEC ALLERGY PO), Take by mouth daily, Disp: , Rfl:     Ergocalciferol (VITAMIN D2 PO), Take by mouth once a week, Disp: , Rfl:     fluticasone (FLONASE) 50 mcg/act nasal spray, 2 sprays into each nostril daily, Disp: , Rfl:     meloxicam (MOBIC) 15 mg tablet, Take 15 mg by mouth daily, Disp: , Rfl:     naproxen (NAPROSYN) 500 mg tablet, Take 1 tablet by mouth every 8 (eight) hours as needed for moderate pain, Disp: 20 tablet, Rfl: 0    ondansetron (ZOFRAN) 4 mg tablet, Take 1 tablet by mouth every 4 (four) hours as needed for nausea or vomiting, Disp: 10 tablet, Rfl: 0    simvastatin (ZOCOR) 20 mg tablet, Take 20 mg by mouth daily at bedtime, Disp: , Rfl:     TIMOLOL MALEATE OP, Apply to eye daily, Disp: , Rfl:   Allergies as of 02/11/2019 - Reviewed 02/04/2019   Allergen Reaction Noted    Bactrim [sulfamethoxazole-trimethoprim] Anaphylaxis and Swelling 11/25/2015    Other  10/23/2017    Pollen extract Itching 04/11/2017    Wound dressings Rash 06/23/2017     Review of Systems   Gastrointestinal: Positive for diarrhea  Negative for abdominal distention  There were no vitals filed for this visit  Physical Exam   Constitutional: She appears well-developed and well-nourished  HENT:   Head: Normocephalic and atraumatic  Eyes: Pupils are equal, round, and reactive to light  EOM are normal    Neck: Normal range of motion  Neck supple  Psychiatric: She has a normal mood and affect   Her behavior is normal  Judgment normal

## 2019-02-22 PROBLEM — R31.29 MICROSCOPIC HEMATURIA: Status: ACTIVE | Noted: 2019-02-22

## 2019-02-22 NOTE — PROGRESS NOTES
2/25/2019      Chief Complaint   Patient presents with    Microhematuria       Assessment and Plan    58 y o  female     1  Microscopic hematuria   - discussed with the patient that a microscopic hematuria workup can consist of an ultrasound and/or CT scan in addition to cystoscopy  - she does report a family history of bladder cancer in her father as well as urachal remnant in her mother so I therefore recommend a CT urogram as opposed an ultrasound, the patient is agreeable to this  - the patient will be scheduled for cystoscopy and with a BMP and CT urogram obtained prior      History of Present Illness  Yuliya Lieu is a 58 y o  female here for initial evaluation of microscopic hematuria  The patient had a microscopic urinalysis obtained 1/28/2019 which revealed 10-20 red blood cells  At this time she also had a urine culture which was negative for UTI but did reveal mixed contaminants  Urine dip today reveals large blood  She denies ever seeing gross hematuria  She has no bothersome lower urinary tract symptoms  She does report a family history of bladder cancer in her father as well as a urachal remnant in her mother  Review of Systems   Constitutional: Negative for activity change, chills and fever  Gastrointestinal: Negative for abdominal distention and abdominal pain  Musculoskeletal: Negative for back pain and gait problem  Psychiatric/Behavioral: Negative for behavioral problems and confusion  Urinary Incontinence Screening      Most Recent Value   Urinary Incontinence   Urinary Incontinence? No   Incomplete emptying? No   Urinary frequency? No   Urinary urgency? Yes   Urinary hesitancy? No   Dysuria (painful difficult urination)? No   Nocturia (waking up to use the bathroom)? No   Straining (having to push to go)? No   Weak stream?  No   Intermittent stream?  No   Post void dribbling?   No          Past Medical History  Past Medical History:   Diagnosis Date    Breast cancer ([facility] [address] ) 03/01/2017    right     Cancer [facility])     R breast     History of radiation therapy 08/01/2017    right breast       Past Social History  Past Surgical History:   Procedure Laterality Date    BREAST CYST EXCISION Left 1976    benign    BREAST LUMPECTOMY Right 03/01/2017    BREAST SURGERY Bilateral     lumpectomy    CHOLECYSTECTOMY      COLONOSCOPY      ESOPHAGOGASTRODUODENOSCOPY      [place] COLONOSCOPY FLX DX W/COLLJ SPEC WHEN PFRMD N/A 10/23/2017    Procedure: COLONOSCOPY;  Surgeon: Rauqel Golden MD;  Location: [facility];  Service: Colorectal     Social History     Tobacco Use   Smoking Status Never Smoker   Smokeless Tobacco Never Used       Past Family History  Family History   Problem Relation Age of Onset    Breast cancer Sister 64    Breast cancer Maternal Grandmother 76    Breast cancer Cousin 48       Past Social history  Social History     Socioeconomic History    Marital status: /Civil Union     Spouse name: Not on file    Number of children: Not on file    Years of education: Not on file    Highest education level: Not on file   Occupational History    Not on file   Social Needs    Financial resource strain: Not on file    Food insecurity:     Worry: Not on file     Inability: Not on file    Transportation needs:     Medical: Not on file     Non-medical: Not on file   Tobacco Use    Smoking status: Never Smoker    Smokeless tobacco: Never Used   Substance and Sexual Activity    Alcohol use: Yes     Comment: rarely    Drug use: No    Sexual activity: Not on file   Lifestyle    Physical activity:     Days per week: Not on file     Minutes per session: Not on file    Stress: Not on file   Relationships    Social connections:     Talks on phone: Not on file     Gets together: Not on file     Attends Taoism service: Not on file     Active member of club or organization: Not on file     Attends meetings of clubs or organizations: Not on file     Relationship status: Not on file    Intimate partner violence:     Fear of current or ex partner: Not on file     Emotionally abused: Not on file     Physically abused: Not on file     Forced sexual activity: Not on file   Other Topics Concern    Not on file   Social History Narrative    Not on file       Current Medications  Current Outpatient Medications   Medication Sig Dispense Refill    anastrozole (ARIMIDEX) 1 mg tablet Take 1 mg by mouth daily      azelastine (ASTELIN) 0 1 % nasal spray 1 spray into each nostril daily Use in each nostril as directed      budesonide (ENTOCORT EC) 3 MG capsule Take 3 capsules (9 mg total) by mouth daily 90 capsule 2    Calcium Ascorbate 500 MG TABS Take 500 mg by mouth      Cetirizine HCl (ZYRTEC ALLERGY PO) Take by mouth daily      Ergocalciferol (VITAMIN D2 PO) Take by mouth once a week      fluticasone (FLONASE) 50 mcg/act nasal spray 2 sprays into each nostril daily      meloxicam (MOBIC) 15 mg tablet Take 15 mg by mouth daily      naproxen (NAPROSYN) 500 mg tablet Take 1 tablet by mouth every 8 (eight) hours as needed for moderate pain 20 tablet 0    ondansetron (ZOFRAN) 4 mg tablet Take 1 tablet by mouth every 4 (four) hours as needed for nausea or vomiting 10 tablet 0    simvastatin (ZOCOR) 20 mg tablet Take 20 mg by mouth daily at bedtime      TIMOLOL MALEATE OP Apply to eye daily       No current facility-administered medications for this visit  Allergies  Allergies   Allergen Reactions    Bactrim [Sulfamethoxazole-Trimethoprim] Anaphylaxis and Swelling     Swelling of throat and nose; itching    Other      clear tape _blisters    Pollen Extract Itching    Wound Dressings Rash         The following portions of the patient's history were reviewed and updated as appropriate: allergies, current medications, past medical history, past social history, past surgical history and problem list       Vitals  There were no vitals filed for this visit        Physical Exam  Constitutional   General appearance: Patient is seated and in no acute distress, well appearing and well nourished  Head and Face   Head and face: Normal     Eyes   Conjunctiva and lids: No erythema, swelling or discharge  Ears, Nose, Mouth, and Throat   Hearing: Normal     Pulmonary   Respiratory effort: No increased work of breathing or signs of respiratory distress  Cardiovascular   Examination of extremities for edema and/or varicosities: Normal     Abdomen   Abdomen: Non-tender, no masses  Musculoskeletal   Gait and station: Normal     Skin   Skin and subcutaneous tissue: Warm, dry, and intact  No visible lesions or rashes  Psychiatric   Judgment and insight: Normal  Recent and remote memory:  Normal  Mood and affect: Normal      Results  No results found for this or any previous visit (from the past 1 hour(s))  ]  No results found for: PSA  Lab Results   Component Value Date    GLUCOSE 110 10/03/2015    CALCIUM 9 0 11/10/2018     10/03/2015    K 3 8 11/10/2018    CO2 27 11/10/2018     11/10/2018    BUN 10 11/10/2018    CREATININE 0 96 11/10/2018     Lab Results   Component Value Date    WBC 4 65 11/10/2018    HGB 13 1 11/10/2018    HCT 40 1 11/10/2018    MCV 93 11/10/2018     11/10/2018       Orders  No orders of the defined types were placed in this encounter

## 2019-02-25 ENCOUNTER — TELEPHONE (OUTPATIENT)
Dept: UROLOGY | Facility: MEDICAL CENTER | Age: 64
End: 2019-02-25

## 2019-02-25 ENCOUNTER — OFFICE VISIT (OUTPATIENT)
Dept: UROLOGY | Facility: CLINIC | Age: 64
End: 2019-02-25
Payer: COMMERCIAL

## 2019-02-25 VITALS
BODY MASS INDEX: 33.23 KG/M2 | SYSTOLIC BLOOD PRESSURE: 132 MMHG | HEART RATE: 64 BPM | DIASTOLIC BLOOD PRESSURE: 66 MMHG | HEIGHT: 61 IN | WEIGHT: 176 LBS

## 2019-02-25 DIAGNOSIS — R31.29 MICROSCOPIC HEMATURIA: Primary | ICD-10-CM

## 2019-02-25 LAB
BACTERIA UR QL AUTO: ABNORMAL /HPF
BILIRUB UR QL STRIP: NEGATIVE
CLARITY UR: ABNORMAL
COLOR UR: YELLOW
GLUCOSE UR STRIP-MCNC: NEGATIVE MG/DL
HGB UR QL STRIP.AUTO: ABNORMAL
KETONES UR STRIP-MCNC: NEGATIVE MG/DL
LEUKOCYTE ESTERASE UR QL STRIP: ABNORMAL
NITRITE UR QL STRIP: NEGATIVE
NON-SQ EPI CELLS URNS QL MICRO: ABNORMAL /HPF
PH UR STRIP.AUTO: 5.5 [PH] (ref 4.5–8)
PROT UR STRIP-MCNC: NEGATIVE MG/DL
RBC #/AREA URNS AUTO: ABNORMAL /HPF
SL AMB  POCT GLUCOSE, UA: NORMAL
SL AMB LEUKOCYTE ESTERASE,UA: NORMAL
SL AMB POCT BILIRUBIN,UA: NORMAL
SL AMB POCT BLOOD,UA: NORMAL
SL AMB POCT CLARITY,UA: CLEAR
SL AMB POCT COLOR,UA: YELLOW
SL AMB POCT KETONES,UA: 5
SL AMB POCT NITRITE,UA: NORMAL
SL AMB POCT PH,UA: 5
SL AMB POCT SPECIFIC GRAVITY,UA: 1.03
SL AMB POCT URINE PROTEIN: NORMAL
SL AMB POCT UROBILINOGEN: 0.2
SP GR UR STRIP.AUTO: 1.03 (ref 1–1.03)
UROBILINOGEN UR QL STRIP.AUTO: 0.2 E.U./DL
WBC #/AREA URNS AUTO: ABNORMAL /HPF

## 2019-02-25 PROCEDURE — 81002 URINALYSIS NONAUTO W/O SCOPE: CPT | Performed by: PHYSICIAN ASSISTANT

## 2019-02-25 PROCEDURE — 99243 OFF/OP CNSLTJ NEW/EST LOW 30: CPT | Performed by: PHYSICIAN ASSISTANT

## 2019-02-25 PROCEDURE — 81001 URINALYSIS AUTO W/SCOPE: CPT | Performed by: PHYSICIAN ASSISTANT

## 2019-02-25 PROCEDURE — 87086 URINE CULTURE/COLONY COUNT: CPT | Performed by: PHYSICIAN ASSISTANT

## 2019-02-25 NOTE — TELEPHONE ENCOUNTER
Notifed pt re: her urine dip stick results  Told her since there were leuks and blood we sent for UA with micro and a C&S  We will call her when the results come back  She would like a sooner appt for her cysto  (3/27) and I told her we would call if anything comes up

## 2019-02-25 NOTE — TELEPHONE ENCOUNTER
Pt calling- would like to know results to her urine dip done 2/25  Would also like sooner cysto if possible  Please advise   [phone]

## 2019-02-26 ENCOUNTER — APPOINTMENT (OUTPATIENT)
Dept: LAB | Facility: HOSPITAL | Age: 64
End: 2019-02-26
Payer: COMMERCIAL

## 2019-02-26 DIAGNOSIS — R31.29 MICROSCOPIC HEMATURIA: ICD-10-CM

## 2019-02-26 LAB
ANION GAP SERPL CALCULATED.3IONS-SCNC: 9 MMOL/L (ref 4–13)
BACTERIA UR CULT: NORMAL
BUN SERPL-MCNC: 14 MG/DL (ref 5–25)
CALCIUM SERPL-MCNC: 9.1 MG/DL (ref 8.3–10.1)
CHLORIDE SERPL-SCNC: 105 MMOL/L (ref 100–108)
CO2 SERPL-SCNC: 31 MMOL/L (ref 21–32)
CREAT SERPL-MCNC: 1.01 MG/DL (ref 0.6–1.3)
GFR SERPL CREATININE-BSD FRML MDRD: 59 ML/MIN/1.73SQ M
GLUCOSE P FAST SERPL-MCNC: 114 MG/DL (ref 65–99)
POTASSIUM SERPL-SCNC: 3.8 MMOL/L (ref 3.5–5.3)
SODIUM SERPL-SCNC: 145 MMOL/L (ref 136–145)

## 2019-02-26 PROCEDURE — 80048 BASIC METABOLIC PNL TOTAL CA: CPT

## 2019-02-26 PROCEDURE — 36415 COLL VENOUS BLD VENIPUNCTURE: CPT

## 2019-02-27 ENCOUNTER — HOSPITAL ENCOUNTER (OUTPATIENT)
Dept: CT IMAGING | Facility: HOSPITAL | Age: 64
Discharge: HOME | End: 2019-02-27
Payer: COMMERCIAL

## 2019-02-27 DIAGNOSIS — R31.29 MICROSCOPIC HEMATURIA: ICD-10-CM

## 2019-02-27 PROCEDURE — 74178 CT ABD&PLV WO CNTR FLWD CNTR: CPT

## 2019-02-27 RX ADMIN — IOHEXOL 100 ML: 350 INJECTION, SOLUTION INTRAVENOUS at 11:11

## 2019-03-06 NOTE — TELEPHONE ENCOUNTER
Contacted patient to discuss CT scan results   Informed patient CT results are negative  Advised patient to keep appointment for cystoscopy

## 2019-03-21 ENCOUNTER — PROCEDURE VISIT (OUTPATIENT)
Dept: UROLOGY | Facility: CLINIC | Age: 64
End: 2019-03-21
Payer: COMMERCIAL

## 2019-03-21 ENCOUNTER — TELEPHONE (OUTPATIENT)
Dept: UROLOGY | Facility: CLINIC | Age: 64
End: 2019-03-21

## 2019-03-21 VITALS
DIASTOLIC BLOOD PRESSURE: 80 MMHG | WEIGHT: 176 LBS | BODY MASS INDEX: 33.23 KG/M2 | HEIGHT: 61 IN | HEART RATE: 81 BPM | SYSTOLIC BLOOD PRESSURE: 130 MMHG

## 2019-03-21 DIAGNOSIS — R31.29 MICROSCOPIC HEMATURIA: Primary | ICD-10-CM

## 2019-03-21 PROCEDURE — 99213 OFFICE O/P EST LOW 20 MIN: CPT | Performed by: UROLOGY

## 2019-03-21 PROCEDURE — 52000 CYSTOURETHROSCOPY: CPT | Performed by: UROLOGY

## 2019-03-21 RX ORDER — PROPRANOLOL/HYDROCHLOROTHIAZID 40 MG-25MG
TABLET ORAL AS NEEDED
COMMUNITY
End: 2021-06-04 | Stop reason: ALTCHOICE

## 2019-03-21 NOTE — PROGRESS NOTES
Cystoscopy  Date/Time: 3/21/2019 10:27 AM  Performed by: Leeann Chaney MD  Authorized by: Leeann Chaney MD     Procedure details: cystoscopy    Patient tolerance: Patient tolerated the procedure well with no immediate complications    Additional Procedure Details:   Cystoscopy Procedure note    Risk and benefits of flexible cystoscopy were discussed  Informed consent was obtained  A urine dip is adequate for cystoscopy  The patient was placed into the modified supine position  Her genitalia was prepped with Betadine and draped in a sterile fashion  Viscous 2% lidocaine was instilled into the urethra and allowed dwell time for local anesthesia  Flexible cystoscopy was then performed with a 16F scope  Urethra was inspected on entrance and exit of the scope  The bladder was thoroughly inspected in a 360 degree fashion  Both ureteral orifices were visualized in their orthotopic location with clear efflux of urine  The bladder mucosa was thoroughly inspected  There was no evidence of mucosal abnormalities, stones, or lesions  Retroflexion for evaluation of the bladder neck was normal       Overall this was a negative cystoscopy  A female office staff member was present throughout the entire procedure

## 2019-03-21 NOTE — PROGRESS NOTES
UROLOGY FOLLOWUP NOTE     CHIEF COMPLAINT   Dyan Sanchez is a 58 y o  female with a complaint of   Chief Complaint   Patient presents with    Cystoscopy    Microhematuria       History of Present Illness:     58 y o  female here for evaluation of microscopic hematuria  The patient had a microscopic urinalysis obtained 1/28/2019 which revealed 10-20 red blood cells  At this time she also had a urine culture which was negative for UTI but did reveal mixed contaminants  Urine dip at index visit reveals large blood  She denies ever seeing gross hematuria  She has no bothersome lower urinary tract symptoms  She does report a family history of bladder cancer in her father as well as a urachal remnant in her mother  Returns today to complete workup      Past Medical History:     Past Medical History:   Diagnosis Date    Breast cancer ([facility] [address] ) 03/01/2017    right     Cancer ([facility] [address] )     R breast     History of radiation therapy 08/01/2017    right breast       PAST SURGICAL HISTORY:     Past Surgical History:   Procedure Laterality Date    BREAST CYST EXCISION Left 1976    benign    BREAST LUMPECTOMY Right 03/01/2017    BREAST SURGERY Bilateral     lumpectomy    CHOLECYSTECTOMY      COLONOSCOPY      ESOPHAGOGASTRODUODENOSCOPY      [place] COLONOSCOPY FLX DX W/COLLJ SPEC WHEN PFRMD N/A 10/23/2017    Procedure: COLONOSCOPY;  Surgeon: Cher Rayo MD;  Location: [facility];  Service: Colorectal       CURRENT MEDICATIONS:     Current Outpatient Medications   Medication Sig Dispense Refill    azelastine (ASTELIN) 0 1 % nasal spray 1 spray into each nostril daily Use in each nostril as directed      budesonide (ENTOCORT EC) 3 MG capsule Take 3 capsules (9 mg total) by mouth daily 90 capsule 2    Calcium Ascorbate 500 MG TABS Take 500 mg by mouth      co-enzyme Q-10 30 MG capsule Take 30 mg by mouth 3 (three) times a day      Ergocalciferol (VITAMIN D2 PO) Take by mouth once a week      fluticasone (FLONASE) 50 mcg/act nasal spray 2 sprays into each nostril daily      ondansetron (ZOFRAN) 4 mg tablet Take 1 tablet by mouth every 4 (four) hours as needed for nausea or vomiting 10 tablet 0    TIMOLOL MALEATE OP Apply to eye daily      Turmeric 500 MG CAPS Take by mouth      anastrozole (ARIMIDEX) 1 mg tablet Take 1 mg by mouth daily      Cetirizine HCl (ZYRTEC ALLERGY PO) Take by mouth daily      meloxicam (MOBIC) 15 mg tablet Take 15 mg by mouth daily      naproxen (NAPROSYN) 500 mg tablet Take 1 tablet by mouth every 8 (eight) hours as needed for moderate pain (Patient not taking: Reported on 2/25/2019) 20 tablet 0    simvastatin (ZOCOR) 20 mg tablet Take 20 mg by mouth daily at bedtime       No current facility-administered medications for this visit          ALLERGIES:     Allergies   Allergen Reactions    Bactrim [Sulfamethoxazole-Trimethoprim] Anaphylaxis and Swelling     Swelling of throat and nose; itching    Other      clear tape _blisters    Pollen Extract Itching    Wound Dressings Rash       SOCIAL HISTORY:     Social History     Socioeconomic History    Marital status: /Civil Union     Spouse name: None    Number of children: None    Years of education: None    Highest education level: None   Occupational History    None   Social Needs    Financial resource strain: None    Food insecurity:     Worry: None     Inability: None    Transportation needs:     Medical: None     Non-medical: None   Tobacco Use    Smoking status: Never Smoker    Smokeless tobacco: Never Used   Substance and Sexual Activity    Alcohol use: Yes     Comment: rarely    Drug use: No    Sexual activity: None   Lifestyle    Physical activity:     Days per week: None     Minutes per session: None    Stress: None   Relationships    Social connections:     Talks on phone: None     Gets together: None     Attends Rastafarian service: None     Active member of club or organization: None     Attends meetings of clubs or organizations: None     Relationship status: None    Intimate partner violence:     Fear of current or ex partner: None     Emotionally abused: None     Physically abused: None     Forced sexual activity: None   Other Topics Concern    None   Social History Narrative    None       SOCIAL HISTORY:     Family History   Problem Relation Age of Onset    Breast cancer Sister 64    Breast cancer Maternal Grandmother 76    Breast cancer Cousin 48       REVIEW OF SYSTEMS:     Review of Systems   Constitutional: Negative  Respiratory: Negative  Cardiovascular: Negative  Gastrointestinal: Negative  Genitourinary: Negative for hematuria  Musculoskeletal: Negative  Skin: Negative  Psychiatric/Behavioral: Negative  PHYSICAL EXAM:     /80   Pulse 81   Ht 5' 1" (1 549 m)   Wt 79 8 kg (176 lb)   LMP  (LMP Unknown)   BMI 33 25 kg/m²     General:  Healthy appearing female in no acute distress  They have a normal affect  There is not appear to be any gross neurologic defects or abnormalities  HEENT:  Normocephalic, atraumatic  Neck is supple without any palpable lymphadenopathy  Cardiovascular:  Patient has normal palpable distal radial pulses  There is no significant peripheral edema  No JVD is noted  Respiratory:  Patient has unlabored respirations  There is no audible wheeze or rhonchi  Abdomen:  Abdomen is   Without surgical scars  Abdomen is soft and nontender  There is no tympany  Inguinal and umbilical hernia are not appreciated  Genitourinary:  Normal external female genitalia, mildly retracted urethral meatus    Musculoskeletal:  Patient does not have significant CVA tenderness in the  flank with palpation or percussion  They full range of motion in all 4 extremities  Strength in all 4 extremities appears congruent  Patient is able to ambulate without assistance or difficulty  Dermatologic:  Patient has no skin abnormalities or rashes        LABS:     CBC: Lab Results   Component Value Date    WBC 4 65 11/10/2018    HGB 13 1 11/10/2018    HCT 40 1 11/10/2018    MCV 93 11/10/2018     11/10/2018       BMP:   Lab Results   Component Value Date    GLUCOSE 110 10/03/2015    CALCIUM 9 1 2019     10/03/2015    K 3 8 2019    CO2 31 2019     2019    BUN 14 2019    CREATININE 1 01 2019 10:29 AM 19 10:09 AM     Clarity, UA  Cloudy  clear    Color, UA  Yellow  yellow    Specific Wilkinson, UA 1 003 - 1 030 1 026  1 030 R   pH, UA 4 5 - 8 0 5 5  5 0 R   Glucose, UA Negative mg/dl Negative  - R   Ketones, UA Negative mg/dl Negative  5 R   Blood, UA Negative ModerateAbnormal   larg R   Protein, UA Negative mg/dl Negative  trace R   Nitrite, UA Negative Negative  - R   Bilirubin, UA Negative Negative     Urobilinogen, UA 0 2, 1 0 E U /dl E U /dl 0 2  0 2 R   Leukocytes, UA Negative SmallAbnormal   small R   WBC, UA None Seen, 0-5, 5-55, 5-65 /hpf 10-20Abnormal      RBC, UA None Seen, 0-5 /hpf 2-4Abnormal      Bacteria, UA None Seen, Occasional /hpf Occasional     Epithelial Cells None Seen, Occasional /hpf Occasional     BILIRUBIN,UA   -          Specimen Collected: 19           IMAGIN/27/19  CT ABDOMEN AND PELVIS WITH AND WITHOUT IV CONTRAST     INDICATION:   Microscopic hematuria      COMPARISON:  None      TECHNIQUE: Initial CT of the abdomen and pelvis was performed without intravenous contrast   Subsequent dynamic CT evaluation of the abdomen and pelvis was performed after the administration of intravenous contrast in both nephrographic and delayed   phases after the administration of intravenous contrast   Axial, sagittal, and coronal 2D reformatted images were created from the source data and submitted for interpretation       Radiation dose length product (DLP) for this visit:  1643 91 mGy-cm     This examination, like all CT scans performed in the [facility], was performed utilizing techniques to minimize radiation dose exposure, including the use of   iterative reconstruction and automated exposure control      IV Contrast:  100 mL of iohexol (OMNIPAQUE)  Enteric Contrast:  Enteric contrast was not administered      FINDINGS:     ABDOMEN     RIGHT KIDNEY AND URETER:  Benign small peripelvic cysts are present  No suspicious renal parenchymal masses  No urothelial masses demonstrated  No hydronephrosis or hydroureter  No urinary tract calculi  No perinephric collection      LEFT KIDNEY AND URETER:  Benign small peripelvic cysts are present  No suspicious renal parenchymal masses  No urothelial masses demonstrated  No hydronephrosis or hydroureter  No urinary tract calculi  No perinephric collection      URINARY BLADDER:  Normal when accounting for degree of nondistention  No bladder wall mass  No calculi         LOWER CHEST:  No clinically significant abnormality identified in the visualized lower chest      LIVER/BILIARY TREE:  Unremarkable      GALLBLADDER:  No calcified gallstones  No pericholecystic inflammatory change      SPLEEN:  Unremarkable      PANCREAS:  Unremarkable      ADRENAL GLANDS:  Unremarkable      STOMACH AND BOWEL:  Unremarkable      ABDOMINOPELVIC CAVITY:  No ascites  No free intraperitoneal air  No lymphadenopathy      VESSELS:  Single renal vein and artery on each side  No renal vascular abnormalities  Abdominal aorta and major branch vessels appear normal with minimal, age-appropriate atherosclerotic changes      PELVIS     REPRODUCTIVE ORGANS:  Unremarkable for patient's age      APPENDIX: A normal appendix was visualized      ABDOMINAL WALL/INGUINAL REGIONS:  Periumbilical diastasis recti  Miniscule knuckle of fat protruding at the right internal inguinal ring  No yanira hernia  No inguinal lymphadenopathy      OSSEOUS STRUCTURES:  No acute fracture or destructive osseous lesion      IMPRESSION:     1    No urolithiasis, renal parenchymal masses, urothelial masses, obstructive uropathy, or acute abdominopelvic findings      2   Tiny, bilateral, benign peripelvic cysts in both kidneys do not warrant imaging follow-up  PROCEDURE:     SEE NOTE    ASSESSMENT:     58 y o  female with asymptomatic microscopic hematuria    PLAN:       Patient had a normal workup for her microscopic hematuria  She has some mild  Parapelvic cysts in the kidneys  She has not have any signs of obstructive disease  Cystoscopy was normal without any evidence of tumor or stone in the bladder  I have recommended follow-up in approximately 6 months with urinalysis prior  Patient knows to call with any urinary symptoms in the interim

## 2019-04-09 ENCOUNTER — TRANSCRIBE ORDERS (OUTPATIENT)
Dept: LAB | Facility: MEDICAL CENTER | Age: 64
End: 2019-04-09

## 2019-04-09 ENCOUNTER — APPOINTMENT (OUTPATIENT)
Dept: LAB | Facility: MEDICAL CENTER | Age: 64
End: 2019-04-09
Payer: COMMERCIAL

## 2019-04-09 DIAGNOSIS — Z13.9 SCREENING FOR UNSPECIFIED CONDITION: ICD-10-CM

## 2019-04-09 DIAGNOSIS — E55.9 VITAMIN D DEFICIENCY: ICD-10-CM

## 2019-04-09 DIAGNOSIS — R31.29 MICROSCOPIC HEMATURIA: ICD-10-CM

## 2019-04-09 DIAGNOSIS — E55.9 VITAMIN D DEFICIENCY: Primary | ICD-10-CM

## 2019-04-09 LAB
25(OH)D3 SERPL-MCNC: 43.7 NG/ML (ref 30–100)
ALBUMIN SERPL BCP-MCNC: 3.2 G/DL (ref 3.5–5)
ALP SERPL-CCNC: 66 U/L (ref 46–116)
ALT SERPL W P-5'-P-CCNC: 27 U/L (ref 12–78)
ANION GAP SERPL CALCULATED.3IONS-SCNC: 5 MMOL/L (ref 4–13)
AST SERPL W P-5'-P-CCNC: 12 U/L (ref 5–45)
BACTERIA UR QL AUTO: ABNORMAL /HPF
BASOPHILS # BLD AUTO: 0.03 THOUSANDS/ΜL (ref 0–0.1)
BASOPHILS NFR BLD AUTO: 0 % (ref 0–1)
BILIRUB SERPL-MCNC: 0.9 MG/DL (ref 0.2–1)
BILIRUB UR QL STRIP: NEGATIVE
BUN SERPL-MCNC: 14 MG/DL (ref 5–25)
CALCIUM SERPL-MCNC: 8.5 MG/DL (ref 8.3–10.1)
CAOX CRY URNS QL MICRO: ABNORMAL /HPF
CHLORIDE SERPL-SCNC: 105 MMOL/L (ref 100–108)
CHOLEST SERPL-MCNC: 256 MG/DL (ref 50–200)
CLARITY UR: ABNORMAL
CO2 SERPL-SCNC: 30 MMOL/L (ref 21–32)
COLOR UR: YELLOW
CREAT SERPL-MCNC: 0.98 MG/DL (ref 0.6–1.3)
EOSINOPHIL # BLD AUTO: 0.12 THOUSAND/ΜL (ref 0–0.61)
EOSINOPHIL NFR BLD AUTO: 2 % (ref 0–6)
ERYTHROCYTE [DISTWIDTH] IN BLOOD BY AUTOMATED COUNT: 15.7 % (ref 11.6–15.1)
ERYTHROCYTE [SEDIMENTATION RATE] IN BLOOD: 29 MM/HOUR (ref 0–20)
GFR SERPL CREATININE-BSD FRML MDRD: 61 ML/MIN/1.73SQ M
GLUCOSE P FAST SERPL-MCNC: 94 MG/DL (ref 65–99)
GLUCOSE UR STRIP-MCNC: NEGATIVE MG/DL
HCT VFR BLD AUTO: 40.8 % (ref 34.8–46.1)
HDLC SERPL-MCNC: 73 MG/DL (ref 40–60)
HGB BLD-MCNC: 12.8 G/DL (ref 11.5–15.4)
HGB UR QL STRIP.AUTO: ABNORMAL
IMM GRANULOCYTES # BLD AUTO: 0.02 THOUSAND/UL (ref 0–0.2)
IMM GRANULOCYTES NFR BLD AUTO: 0 % (ref 0–2)
KETONES UR STRIP-MCNC: NEGATIVE MG/DL
LDLC SERPL CALC-MCNC: 147 MG/DL (ref 0–100)
LEUKOCYTE ESTERASE UR QL STRIP: ABNORMAL
LYMPHOCYTES # BLD AUTO: 1.7 THOUSANDS/ΜL (ref 0.6–4.47)
LYMPHOCYTES NFR BLD AUTO: 23 % (ref 14–44)
MCH RBC QN AUTO: 30.7 PG (ref 26.8–34.3)
MCHC RBC AUTO-ENTMCNC: 31.4 G/DL (ref 31.4–37.4)
MCV RBC AUTO: 98 FL (ref 82–98)
MONOCYTES # BLD AUTO: 0.53 THOUSAND/ΜL (ref 0.17–1.22)
MONOCYTES NFR BLD AUTO: 7 % (ref 4–12)
NEUTROPHILS # BLD AUTO: 4.97 THOUSANDS/ΜL (ref 1.85–7.62)
NEUTS SEG NFR BLD AUTO: 68 % (ref 43–75)
NITRITE UR QL STRIP: NEGATIVE
NON-SQ EPI CELLS URNS QL MICRO: ABNORMAL /HPF
NONHDLC SERPL-MCNC: 183 MG/DL
NRBC BLD AUTO-RTO: 0 /100 WBCS
PH UR STRIP.AUTO: 6 [PH]
PLATELET # BLD AUTO: 267 THOUSANDS/UL (ref 149–390)
PMV BLD AUTO: 9.6 FL (ref 8.9–12.7)
POTASSIUM SERPL-SCNC: 3.5 MMOL/L (ref 3.5–5.3)
PROT SERPL-MCNC: 6.8 G/DL (ref 6.4–8.2)
PROT UR STRIP-MCNC: ABNORMAL MG/DL
RBC # BLD AUTO: 4.17 MILLION/UL (ref 3.81–5.12)
RBC #/AREA URNS AUTO: ABNORMAL /HPF
SODIUM SERPL-SCNC: 140 MMOL/L (ref 136–145)
SP GR UR STRIP.AUTO: 1.02 (ref 1–1.03)
TRIGL SERPL-MCNC: 178 MG/DL
TSH SERPL DL<=0.05 MIU/L-ACNC: 2.18 UIU/ML (ref 0.36–3.74)
UROBILINOGEN UR QL STRIP.AUTO: 0.2 E.U./DL
WBC # BLD AUTO: 7.37 THOUSAND/UL (ref 4.31–10.16)
WBC #/AREA URNS AUTO: ABNORMAL /HPF

## 2019-04-09 PROCEDURE — 81001 URINALYSIS AUTO W/SCOPE: CPT | Performed by: FAMILY MEDICINE

## 2019-04-09 PROCEDURE — 80053 COMPREHEN METABOLIC PANEL: CPT

## 2019-04-09 PROCEDURE — 85025 COMPLETE CBC W/AUTO DIFF WBC: CPT

## 2019-04-09 PROCEDURE — 36415 COLL VENOUS BLD VENIPUNCTURE: CPT

## 2019-04-09 PROCEDURE — 85652 RBC SED RATE AUTOMATED: CPT

## 2019-04-09 PROCEDURE — 84443 ASSAY THYROID STIM HORMONE: CPT

## 2019-04-09 PROCEDURE — 82306 VITAMIN D 25 HYDROXY: CPT

## 2019-04-09 PROCEDURE — 80061 LIPID PANEL: CPT

## 2019-05-03 ENCOUNTER — TRANSCRIBE ORDERS (OUTPATIENT)
Dept: ADMINISTRATIVE | Facility: HOSPITAL | Age: 64
End: 2019-05-03

## 2019-05-03 ENCOUNTER — APPOINTMENT (OUTPATIENT)
Dept: LAB | Facility: HOSPITAL | Age: 64
End: 2019-05-03
Payer: COMMERCIAL

## 2019-05-03 DIAGNOSIS — K90.0 CELIAC DISEASE: ICD-10-CM

## 2019-05-03 DIAGNOSIS — K90.0 CELIAC DISEASE: Primary | ICD-10-CM

## 2019-05-03 PROCEDURE — 86255 FLUORESCENT ANTIBODY SCREEN: CPT

## 2019-05-03 PROCEDURE — 36415 COLL VENOUS BLD VENIPUNCTURE: CPT

## 2019-05-03 PROCEDURE — 82784 ASSAY IGA/IGD/IGG/IGM EACH: CPT

## 2019-05-03 PROCEDURE — 83516 IMMUNOASSAY NONANTIBODY: CPT

## 2019-05-04 LAB
ENDOMYSIUM IGA SER QL: NEGATIVE
GLIADIN PEPTIDE IGA SER-ACNC: 4 UNITS (ref 0–19)
GLIADIN PEPTIDE IGG SER-ACNC: 2 UNITS (ref 0–19)
IGA SERPL-MCNC: 264 MG/DL (ref 87–352)
TTG IGA SER-ACNC: <2 U/ML (ref 0–3)
TTG IGG SER-ACNC: <2 U/ML (ref 0–5)

## 2019-05-19 ENCOUNTER — TRANSCRIBE ORDERS (OUTPATIENT)
Dept: ADMINISTRATIVE | Facility: HOSPITAL | Age: 64
End: 2019-05-19

## 2019-05-19 ENCOUNTER — HOSPITAL ENCOUNTER (OUTPATIENT)
Dept: RADIOLOGY | Facility: HOSPITAL | Age: 64
Discharge: HOME | End: 2019-05-19
Payer: COMMERCIAL

## 2019-05-19 ENCOUNTER — APPOINTMENT (OUTPATIENT)
Dept: LAB | Facility: HOSPITAL | Age: 64
End: 2019-05-19
Payer: COMMERCIAL

## 2019-05-19 DIAGNOSIS — R60.9 EDEMA, UNSPECIFIED TYPE: ICD-10-CM

## 2019-05-19 DIAGNOSIS — J18.9 PNEUMONIA DUE TO INFECTIOUS ORGANISM, UNSPECIFIED LATERALITY, UNSPECIFIED PART OF LUNG: ICD-10-CM

## 2019-05-19 DIAGNOSIS — R07.9 CHEST PAIN, UNSPECIFIED TYPE: Primary | ICD-10-CM

## 2019-05-19 DIAGNOSIS — R07.9 CHEST PAIN, UNSPECIFIED TYPE: ICD-10-CM

## 2019-05-19 LAB
ALBUMIN SERPL BCP-MCNC: 3.2 G/DL (ref 3.5–5)
ALP SERPL-CCNC: 73 U/L (ref 46–116)
ALT SERPL W P-5'-P-CCNC: 27 U/L (ref 12–78)
ANION GAP SERPL CALCULATED.3IONS-SCNC: 11 MMOL/L (ref 4–13)
AST SERPL W P-5'-P-CCNC: 16 U/L (ref 5–45)
BASOPHILS # BLD AUTO: 0.02 THOUSANDS/ΜL (ref 0–0.1)
BASOPHILS NFR BLD AUTO: 0 % (ref 0–1)
BILIRUB SERPL-MCNC: 0.9 MG/DL (ref 0.2–1)
BUN SERPL-MCNC: 17 MG/DL (ref 5–25)
CALCIUM SERPL-MCNC: 9.7 MG/DL (ref 8.3–10.1)
CHLORIDE SERPL-SCNC: 103 MMOL/L (ref 100–108)
CO2 SERPL-SCNC: 26 MMOL/L (ref 21–32)
CREAT SERPL-MCNC: 1.27 MG/DL (ref 0.6–1.3)
EOSINOPHIL # BLD AUTO: 0.01 THOUSAND/ΜL (ref 0–0.61)
EOSINOPHIL NFR BLD AUTO: 0 % (ref 0–6)
ERYTHROCYTE [DISTWIDTH] IN BLOOD BY AUTOMATED COUNT: 14.3 % (ref 11.6–15.1)
GFR SERPL CREATININE-BSD FRML MDRD: 45 ML/MIN/1.73SQ M
GLUCOSE SERPL-MCNC: 154 MG/DL (ref 65–140)
HCT VFR BLD AUTO: 46.5 % (ref 34.8–46.1)
HGB BLD-MCNC: 14.9 G/DL (ref 11.5–15.4)
IMM GRANULOCYTES # BLD AUTO: 0.07 THOUSAND/UL (ref 0–0.2)
IMM GRANULOCYTES NFR BLD AUTO: 1 % (ref 0–2)
LYMPHOCYTES # BLD AUTO: 0.77 THOUSANDS/ΜL (ref 0.6–4.47)
LYMPHOCYTES NFR BLD AUTO: 6 % (ref 14–44)
MCH RBC QN AUTO: 31.1 PG (ref 26.8–34.3)
MCHC RBC AUTO-ENTMCNC: 32 G/DL (ref 31.4–37.4)
MCV RBC AUTO: 97 FL (ref 82–98)
MONOCYTES # BLD AUTO: 0.74 THOUSAND/ΜL (ref 0.17–1.22)
MONOCYTES NFR BLD AUTO: 6 % (ref 4–12)
NEUTROPHILS # BLD AUTO: 11.71 THOUSANDS/ΜL (ref 1.85–7.62)
NEUTS SEG NFR BLD AUTO: 87 % (ref 43–75)
NRBC BLD AUTO-RTO: 0 /100 WBCS
NT-PROBNP SERPL-MCNC: 91 PG/ML
PLATELET # BLD AUTO: 335 THOUSANDS/UL (ref 149–390)
PMV BLD AUTO: 8.9 FL (ref 8.9–12.7)
POTASSIUM SERPL-SCNC: 3.7 MMOL/L (ref 3.5–5.3)
PROT SERPL-MCNC: 7.8 G/DL (ref 6.4–8.2)
RBC # BLD AUTO: 4.79 MILLION/UL (ref 3.81–5.12)
SODIUM SERPL-SCNC: 140 MMOL/L (ref 136–145)
WBC # BLD AUTO: 13.32 THOUSAND/UL (ref 4.31–10.16)

## 2019-05-19 PROCEDURE — 85025 COMPLETE CBC W/AUTO DIFF WBC: CPT

## 2019-05-19 PROCEDURE — 83880 ASSAY OF NATRIURETIC PEPTIDE: CPT

## 2019-05-19 PROCEDURE — 86738 MYCOPLASMA ANTIBODY: CPT

## 2019-05-19 PROCEDURE — 36415 COLL VENOUS BLD VENIPUNCTURE: CPT

## 2019-05-19 PROCEDURE — 71046 X-RAY EXAM CHEST 2 VIEWS: CPT

## 2019-05-19 PROCEDURE — 80053 COMPREHEN METABOLIC PANEL: CPT

## 2019-05-20 ENCOUNTER — TRANSCRIBE ORDERS (OUTPATIENT)
Dept: ADMINISTRATIVE | Facility: HOSPITAL | Age: 64
End: 2019-05-20

## 2019-05-20 ENCOUNTER — HOSPITAL ENCOUNTER (OUTPATIENT)
Dept: NON INVASIVE DIAGNOSTICS | Facility: HOSPITAL | Age: 64
Discharge: HOME | End: 2019-05-20
Payer: COMMERCIAL

## 2019-05-20 DIAGNOSIS — R07.9 CHEST PAIN, UNSPECIFIED TYPE: Primary | ICD-10-CM

## 2019-05-20 DIAGNOSIS — R07.9 CHEST PAIN, UNSPECIFIED TYPE: ICD-10-CM

## 2019-05-20 PROCEDURE — 93005 ELECTROCARDIOGRAM TRACING: CPT

## 2019-05-21 LAB
ATRIAL RATE: 90 BPM
P AXIS: 65 DEGREES
PR INTERVAL: 144 MS
QRS AXIS: 41 DEGREES
QRSD INTERVAL: 64 MS
QT INTERVAL: 340 MS
QTC INTERVAL: 415 MS
T WAVE AXIS: 5 DEGREES
VENTRICULAR RATE: 90 BPM

## 2019-05-21 PROCEDURE — 93010 ELECTROCARDIOGRAM REPORT: CPT | Performed by: INTERNAL MEDICINE

## 2019-05-22 LAB
M PNEUMO IGG SER IA-ACNC: 158 U/ML (ref 0–99)
M PNEUMO IGM SER IA-ACNC: <770 U/ML (ref 0–769)

## 2019-06-10 ENCOUNTER — OFFICE VISIT (OUTPATIENT)
Dept: SURGERY | Facility: HOSPITAL | Age: 64
End: 2019-06-10
Payer: COMMERCIAL

## 2019-06-10 VITALS — WEIGHT: 180 LBS | BODY MASS INDEX: 33.99 KG/M2 | HEIGHT: 61 IN

## 2019-06-10 DIAGNOSIS — R19.4 CHANGE IN BOWEL HABITS: ICD-10-CM

## 2019-06-10 DIAGNOSIS — K52.9 COLITIS: Primary | ICD-10-CM

## 2019-06-10 PROCEDURE — 99213 OFFICE O/P EST LOW 20 MIN: CPT | Performed by: COLON & RECTAL SURGERY

## 2019-06-10 RX ORDER — DOXYCYCLINE 100 MG/1
100 CAPSULE ORAL 2 TIMES DAILY
COMMUNITY
End: 2020-06-08

## 2019-06-10 RX ORDER — ATORVASTATIN CALCIUM 10 MG/1
10 TABLET, FILM COATED ORAL DAILY
COMMUNITY
End: 2020-06-08

## 2019-06-10 RX ORDER — BUDESONIDE 3 MG/1
9 CAPSULE, COATED PELLETS ORAL DAILY
Qty: 270 CAPSULE | Refills: 2 | Status: SHIPPED | OUTPATIENT
Start: 2019-06-10 | End: 2019-09-08

## 2019-06-24 ENCOUNTER — TRANSCRIBE ORDERS (OUTPATIENT)
Dept: ADMINISTRATIVE | Facility: HOSPITAL | Age: 64
End: 2019-06-24

## 2019-06-24 DIAGNOSIS — R07.9 CHEST PAIN, UNSPECIFIED TYPE: Primary | ICD-10-CM

## 2019-06-28 ENCOUNTER — HOSPITAL ENCOUNTER (OUTPATIENT)
Dept: NON INVASIVE DIAGNOSTICS | Facility: HOSPITAL | Age: 64
Discharge: HOME | End: 2019-06-28
Payer: COMMERCIAL

## 2019-06-28 DIAGNOSIS — R07.9 CHEST PAIN, UNSPECIFIED TYPE: ICD-10-CM

## 2019-06-28 LAB
CHEST PAIN STATEMENT: NORMAL
MAX DIASTOLIC BP: 86 MMHG
MAX HEART RATE: 141 BPM
MAX PREDICTED HEART RATE: 156 BPM
MAX. SYSTOLIC BP: 142 MMHG
PROTOCOL NAME: NORMAL
TARGET HR FORMULA: NORMAL
TEST INDICATION: NORMAL
TIME IN EXERCISE PHASE: NORMAL

## 2019-06-28 PROCEDURE — 93017 CV STRESS TEST TRACING ONLY: CPT

## 2019-06-28 PROCEDURE — 93016 CV STRESS TEST SUPVJ ONLY: CPT | Performed by: INTERNAL MEDICINE

## 2019-06-28 PROCEDURE — 93018 CV STRESS TEST I&R ONLY: CPT | Performed by: INTERNAL MEDICINE

## 2019-07-05 ENCOUNTER — TRANSCRIBE ORDERS (OUTPATIENT)
Dept: ADMINISTRATIVE | Facility: HOSPITAL | Age: 64
End: 2019-07-05

## 2019-07-05 DIAGNOSIS — M85.80 OSTEOPENIA, UNSPECIFIED LOCATION: Primary | ICD-10-CM

## 2019-07-09 ENCOUNTER — HOSPITAL ENCOUNTER (OUTPATIENT)
Dept: BONE DENSITY | Facility: HOSPITAL | Age: 64
Discharge: HOME | End: 2019-07-09
Payer: COMMERCIAL

## 2019-07-09 DIAGNOSIS — M85.80 OSTEOPENIA, UNSPECIFIED LOCATION: ICD-10-CM

## 2019-07-09 PROCEDURE — 77080 DXA BONE DENSITY AXIAL: CPT

## 2019-07-22 ENCOUNTER — TRANSCRIBE ORDERS (OUTPATIENT)
Dept: ADMINISTRATIVE | Facility: HOSPITAL | Age: 64
End: 2019-07-22

## 2019-07-22 ENCOUNTER — APPOINTMENT (OUTPATIENT)
Dept: LAB | Facility: HOSPITAL | Age: 64
End: 2019-07-22
Payer: COMMERCIAL

## 2019-07-22 DIAGNOSIS — R76.8 FALSE POSITIVE ANA: ICD-10-CM

## 2019-07-22 DIAGNOSIS — R76.8 FALSE POSITIVE ANA: Primary | ICD-10-CM

## 2019-07-22 LAB
C3 SERPL-MCNC: 157 MG/DL (ref 90–180)
C4 SERPL-MCNC: 42 MG/DL (ref 10–40)
CRP SERPL QL: 7.8 MG/L
ERYTHROCYTE [SEDIMENTATION RATE] IN BLOOD: 60 MM/HOUR (ref 0–20)

## 2019-07-22 PROCEDURE — 86140 C-REACTIVE PROTEIN: CPT

## 2019-07-22 PROCEDURE — 36415 COLL VENOUS BLD VENIPUNCTURE: CPT

## 2019-07-22 PROCEDURE — 86160 COMPLEMENT ANTIGEN: CPT

## 2019-07-22 PROCEDURE — 85652 RBC SED RATE AUTOMATED: CPT

## 2019-08-11 ENCOUNTER — APPOINTMENT (OUTPATIENT)
Dept: LAB | Facility: HOSPITAL | Age: 64
End: 2019-08-11
Payer: MEDICARE

## 2019-08-11 ENCOUNTER — TRANSCRIBE ORDERS (OUTPATIENT)
Dept: ADMINISTRATIVE | Facility: HOSPITAL | Age: 64
End: 2019-08-11

## 2019-08-11 DIAGNOSIS — E78.5 HYPERLIPIDEMIA, UNSPECIFIED HYPERLIPIDEMIA TYPE: ICD-10-CM

## 2019-08-11 DIAGNOSIS — Z13.9 SCREENING FOR CONDITION: Primary | ICD-10-CM

## 2019-08-11 DIAGNOSIS — Z13.9 SCREENING FOR CONDITION: ICD-10-CM

## 2019-08-11 LAB
ALBUMIN SERPL BCP-MCNC: 3.4 G/DL (ref 3.5–5)
ALP SERPL-CCNC: 76 U/L (ref 46–116)
ALT SERPL W P-5'-P-CCNC: 20 U/L (ref 12–78)
ANION GAP SERPL CALCULATED.3IONS-SCNC: 8 MMOL/L (ref 4–13)
AST SERPL W P-5'-P-CCNC: 15 U/L (ref 5–45)
BASOPHILS # BLD AUTO: 0.04 THOUSANDS/ΜL (ref 0–0.1)
BASOPHILS NFR BLD AUTO: 1 % (ref 0–1)
BILIRUB SERPL-MCNC: 1 MG/DL (ref 0.2–1)
BUN SERPL-MCNC: 7 MG/DL (ref 5–25)
CALCIUM SERPL-MCNC: 8.7 MG/DL (ref 8.3–10.1)
CHLORIDE SERPL-SCNC: 107 MMOL/L (ref 100–108)
CHOLEST SERPL-MCNC: 166 MG/DL (ref 50–200)
CO2 SERPL-SCNC: 28 MMOL/L (ref 21–32)
CREAT SERPL-MCNC: 0.94 MG/DL (ref 0.6–1.3)
EOSINOPHIL # BLD AUTO: 0.22 THOUSAND/ΜL (ref 0–0.61)
EOSINOPHIL NFR BLD AUTO: 4 % (ref 0–6)
ERYTHROCYTE [DISTWIDTH] IN BLOOD BY AUTOMATED COUNT: 13.2 % (ref 11.6–15.1)
GFR SERPL CREATININE-BSD FRML MDRD: 64 ML/MIN/1.73SQ M
GLUCOSE P FAST SERPL-MCNC: 122 MG/DL (ref 65–99)
HCT VFR BLD AUTO: 41 % (ref 34.8–46.1)
HGB BLD-MCNC: 12.8 G/DL (ref 11.5–15.4)
IMM GRANULOCYTES # BLD AUTO: 0.01 THOUSAND/UL (ref 0–0.2)
IMM GRANULOCYTES NFR BLD AUTO: 0 % (ref 0–2)
LDLC SERPL DIRECT ASSAY-MCNC: 82 MG/DL (ref 0–100)
LYMPHOCYTES # BLD AUTO: 0.99 THOUSANDS/ΜL (ref 0.6–4.47)
LYMPHOCYTES NFR BLD AUTO: 17 % (ref 14–44)
MCH RBC QN AUTO: 30.8 PG (ref 26.8–34.3)
MCHC RBC AUTO-ENTMCNC: 31.2 G/DL (ref 31.4–37.4)
MCV RBC AUTO: 99 FL (ref 82–98)
MONOCYTES # BLD AUTO: 0.62 THOUSAND/ΜL (ref 0.17–1.22)
MONOCYTES NFR BLD AUTO: 11 % (ref 4–12)
NEUTROPHILS # BLD AUTO: 4.01 THOUSANDS/ΜL (ref 1.85–7.62)
NEUTS SEG NFR BLD AUTO: 67 % (ref 43–75)
NRBC BLD AUTO-RTO: 0 /100 WBCS
PLATELET # BLD AUTO: 279 THOUSANDS/UL (ref 149–390)
PMV BLD AUTO: 8.7 FL (ref 8.9–12.7)
POTASSIUM SERPL-SCNC: 3.8 MMOL/L (ref 3.5–5.3)
PROT SERPL-MCNC: 7 G/DL (ref 6.4–8.2)
RBC # BLD AUTO: 4.16 MILLION/UL (ref 3.81–5.12)
SODIUM SERPL-SCNC: 143 MMOL/L (ref 136–145)
WBC # BLD AUTO: 5.89 THOUSAND/UL (ref 4.31–10.16)

## 2019-08-11 PROCEDURE — 36415 COLL VENOUS BLD VENIPUNCTURE: CPT

## 2019-08-11 PROCEDURE — 80053 COMPREHEN METABOLIC PANEL: CPT

## 2019-08-11 PROCEDURE — 82465 ASSAY BLD/SERUM CHOLESTEROL: CPT

## 2019-08-11 PROCEDURE — 85025 COMPLETE CBC W/AUTO DIFF WBC: CPT

## 2019-08-11 PROCEDURE — 83721 ASSAY OF BLOOD LIPOPROTEIN: CPT

## 2019-09-17 ENCOUNTER — TRANSCRIBE ORDERS (OUTPATIENT)
Dept: ADMINISTRATIVE | Facility: HOSPITAL | Age: 64
End: 2019-09-17

## 2019-09-17 ENCOUNTER — APPOINTMENT (OUTPATIENT)
Dept: LAB | Facility: HOSPITAL | Age: 64
End: 2019-09-17
Attending: UROLOGY
Payer: MEDICARE

## 2019-09-17 DIAGNOSIS — R31.29 MICROSCOPIC HEMATURIA: Primary | ICD-10-CM

## 2019-09-17 LAB
BACTERIA UR QL AUTO: ABNORMAL /HPF
BILIRUB UR QL STRIP: NEGATIVE
CLARITY UR: CLEAR
COLOR UR: YELLOW
GLUCOSE UR STRIP-MCNC: NEGATIVE MG/DL
HGB UR QL STRIP.AUTO: ABNORMAL
KETONES UR STRIP-MCNC: NEGATIVE MG/DL
LEUKOCYTE ESTERASE UR QL STRIP: NEGATIVE
NITRITE UR QL STRIP: NEGATIVE
NON-SQ EPI CELLS URNS QL MICRO: ABNORMAL /HPF
PH UR STRIP.AUTO: 5.5 [PH]
PROT UR STRIP-MCNC: NEGATIVE MG/DL
RBC #/AREA URNS AUTO: ABNORMAL /HPF
SP GR UR STRIP.AUTO: >=1.03 (ref 1–1.03)
UROBILINOGEN UR QL STRIP.AUTO: 0.2 E.U./DL
WBC #/AREA URNS AUTO: ABNORMAL /HPF

## 2019-09-17 PROCEDURE — 81001 URINALYSIS AUTO W/SCOPE: CPT | Performed by: UROLOGY

## 2019-09-20 ENCOUNTER — OFFICE VISIT (OUTPATIENT)
Dept: UROLOGY | Facility: CLINIC | Age: 64
End: 2019-09-20
Payer: MEDICARE

## 2019-09-20 VITALS
BODY MASS INDEX: 33.82 KG/M2 | DIASTOLIC BLOOD PRESSURE: 82 MMHG | SYSTOLIC BLOOD PRESSURE: 126 MMHG | HEART RATE: 68 BPM | WEIGHT: 179.01 LBS

## 2019-09-20 DIAGNOSIS — R31.29 MICROHEMATURIA: Primary | ICD-10-CM

## 2019-09-20 LAB
SL AMB  POCT GLUCOSE, UA: NORMAL
SL AMB LEUKOCYTE ESTERASE,UA: NORMAL
SL AMB POCT BILIRUBIN,UA: NORMAL
SL AMB POCT BLOOD,UA: NORMAL
SL AMB POCT CLARITY,UA: CLEAR
SL AMB POCT COLOR,UA: YELLOW
SL AMB POCT KETONES,UA: NORMAL
SL AMB POCT NITRITE,UA: NORMAL
SL AMB POCT PH,UA: 5
SL AMB POCT SPECIFIC GRAVITY,UA: 1.03
SL AMB POCT URINE PROTEIN: NORMAL
SL AMB POCT UROBILINOGEN: 0.2

## 2019-09-20 PROCEDURE — 99214 OFFICE O/P EST MOD 30 MIN: CPT | Performed by: PHYSICIAN ASSISTANT

## 2019-09-20 PROCEDURE — 81002 URINALYSIS NONAUTO W/O SCOPE: CPT | Performed by: PHYSICIAN ASSISTANT

## 2019-09-20 PROCEDURE — 87086 URINE CULTURE/COLONY COUNT: CPT | Performed by: PHYSICIAN ASSISTANT

## 2019-09-20 RX ORDER — HYDROXYCHLOROQUINE SULFATE 200 MG/1
200 TABLET, FILM COATED ORAL 2 TIMES DAILY WITH MEALS
Refills: 0 | COMMUNITY
Start: 2019-09-16

## 2019-09-20 NOTE — PROGRESS NOTES
9/20/2019      Chief Complaint   Patient presents with    Microhematuria         Assessment and Plan    71 y o  female managed by Dr Shahriar Velásquez    1  Micro hematuria  - 10-20 RBCs on 2x specimens last year  - negative CT urogram 2/2019  - negative cystoscopy 03/21/2019  - persistent microscopic hematuria 9/17/19, with few WBCs, no UTI symptoms will send culture  - large dip positive blood in urine specimen today, otherwise negative    6 months with UA micro  Consider nephrology evaluation    History of Present Illness  Stevan Fernandez is a 71 y o  female here for evaluation of asymptomatic persistent microscopic hematuria status post negative workup including CT urogram and cystoscopy March 2019  Microscopic analysis this week with persistent true microscopic hematuria, there were a few WBCs seen as well, patient has no UTI symptoms, her dip today is negative with the exception of positive blood  We will send culture to confirm  Patient is undergoing extensive testing for autoimmune disease with Rheumatology  She is recently started on Plaquenil  She has never previously seen Nephrology in the past   We will consider nephrology evaluation in the future as potential source of her microscopic hematuria given likely underlying autoimmune disease  She has no vaginal symptoms or bleeding  Review of Systems   Constitutional: Positive for fatigue  Respiratory: Negative  Cardiovascular: Negative  Genitourinary: Negative for decreased urine volume, difficulty urinating, dysuria, flank pain, frequency, hematuria and urgency  Musculoskeletal: Positive for arthralgias and joint swelling  Urinary Incontinence Screening      Most Recent Value   Urinary Incontinence   Urinary Incontinence? No   Incomplete emptying? No   Urinary frequency? No   Urinary urgency? Yes   Urinary hesitancy? No   Dysuria (painful difficult urination)? No   Nocturia (waking up to use the bathroom)?   No   Straining (having to push to go)? Yes   Weak stream?  No   Intermittent stream?  No   Post void dribbling?   No               Past Medical History  Past Medical History:   Diagnosis Date    Breast cancer ([facility] [address] ) 03/01/2017    right     Cancer [facility])     R breast     History of radiation therapy 08/01/2017    right breast     Past Social History  Past Surgical History:   Procedure Laterality Date    BREAST CYST EXCISION Left 1976    benign    BREAST LUMPECTOMY Right 03/01/2017    BREAST SURGERY Bilateral     lumpectomy    CHOLECYSTECTOMY      COLONOSCOPY      ESOPHAGOGASTRODUODENOSCOPY      MS COLONOSCOPY FLX DX W/COLLJ SPEC WHEN PFRMD N/A 10/23/2017    Procedure: COLONOSCOPY;  Surgeon: Doni Dee MD;  [facility]: [facility];  Service: Colorectal     Social History     Tobacco Use   Smoking Status Never Smoker   Smokeless Tobacco Never Used     Past Family History  Family History   Problem Relation Age of Onset    Breast cancer Sister 64    Breast cancer Maternal Grandmother 76    Breast cancer Cousin 48     Past Social history  Social History     Socioeconomic History    Marital status: /Civil Union     Spouse name: Not on file    Number of children: Not on file    Years of education: Not on file    Highest education level: Not on file   Occupational History    Not on file   Social Needs    Financial resource strain: Not on file    Food insecurity:     Worry: Not on file     Inability: Not on file    Transportation needs:     Medical: Not on file     Non-medical: Not on file   Tobacco Use    Smoking status: Never Smoker    Smokeless tobacco: Never Used   Substance and Sexual Activity    Alcohol use: Not Currently     Comment: rarely    Drug use: No    Sexual activity: Not on file   Lifestyle    Physical activity:     Days per week: Not on file     Minutes per session: Not on file    Stress: Not on file   Relationships    Social connections:     Talks on phone: Not on file     Gets together: Not on file     Attends [employer] service: Not on file     Active member of club or organization: Not on file     Attends meetings of clubs or organizations: Not on file     Relationship status: Not on file    Intimate partner violence:     Fear of current or ex partner: Not on file     Emotionally abused: Not on file     Physically abused: Not on file     Forced sexual activity: Not on file   Other Topics Concern    Not on file   Social History Narrative    Not on file     Current Medications  Current Outpatient Medications   Medication Sig Dispense Refill    atorvastatin (LIPITOR) 10 mg tablet Take 10 mg by mouth daily      azelastine (ASTELIN) 0 1 % nasal spray 1 spray into each nostril daily Use in each nostril as directed      Calcium Ascorbate 500 MG TABS Take 500 mg by mouth      co-enzyme Q-10 30 MG capsule Take 30 mg by mouth 3 (three) times a day      Ergocalciferol (VITAMIN D2 PO) Take by mouth once a week      fluticasone (FLONASE) 50 mcg/act nasal spray 2 sprays into each nostril daily      hydroxychloroquine (PLAQUENIL) 200 mg tablet 200 mg  0    TIMOLOL MALEATE OP Apply to eye daily      Turmeric 500 MG CAPS Take by mouth      ALBUTEROL IN Inhale      anastrozole (ARIMIDEX) 1 mg tablet Take 1 mg by mouth daily      budesonide (ENTOCORT EC) 3 MG capsule Take 3 capsules (9 mg total) by mouth daily 90 capsule 2    Cetirizine HCl (ZYRTEC ALLERGY PO) Take by mouth daily      doxycycline monohydrate (MONODOX) 100 mg capsule Take 100 mg by mouth 2 (two) times a day      meloxicam (MOBIC) 15 mg tablet Take 15 mg by mouth daily      naproxen (NAPROSYN) 500 mg tablet Take 1 tablet by mouth every 8 (eight) hours as needed for moderate pain (Patient not taking: Reported on 2/25/2019) 20 tablet 0    ondansetron (ZOFRAN) 4 mg tablet Take 1 tablet by mouth every 4 (four) hours as needed for nausea or vomiting 10 tablet 0    simvastatin (ZOCOR) 20 mg tablet Take 20 mg by mouth daily at bedtime No current facility-administered medications for this visit  Allergies  Allergies   Allergen Reactions    Bactrim [Sulfamethoxazole-Trimethoprim] Anaphylaxis and Swelling     Swelling of throat and nose; itching    Other      clear tape _blisters    Pollen Extract Itching    Wound Dressings Rash         The following portions of the patient's history were reviewed and updated as appropriate: allergies, current medications, past medical history, past social history, past surgical history and problem list       Vitals  Vitals:    09/20/19 1254   BP: 126/82   Pulse: 68   Weight: 81 2 kg (179 lb 0 2 oz)       Physical Exam   Constitutional: She is oriented to person, place, and time  She appears well-developed and well-nourished  No distress  HENT:   Head: Normocephalic and atraumatic  Pulmonary/Chest: Effort normal    Musculoskeletal: She exhibits no edema  Neurological: She is alert and oriented to person, place, and time  Gait normal    Skin: Skin is warm and dry  She is not diaphoretic  Psychiatric: She has a normal mood and affect  Her speech is normal and behavior is normal    Nursing note and vitals reviewed  Results  No results found for this or any previous visit (from the past 1 hour(s))  ]  No results found for: PSA  Lab Results   Component Value Date    GLUCOSE 110 10/03/2015    CALCIUM 8 7 08/11/2019     10/03/2015    K 3 8 08/11/2019    CO2 28 08/11/2019     08/11/2019    BUN 7 08/11/2019    CREATININE 0 94 08/11/2019     Lab Results   Component Value Date    WBC 5 89 08/11/2019    HGB 12 8 08/11/2019    HCT 41 0 08/11/2019    MCV 99 (H) 08/11/2019     08/11/2019         Orders  Orders Placed This Encounter   Procedures    Urine culture    Urinalysis with microscopic     Standing Status:   Future     Standing Expiration Date:   3/20/2021    POCT urine dip

## 2019-09-21 LAB — BACTERIA UR CULT: NORMAL

## 2019-09-23 ENCOUNTER — TELEPHONE (OUTPATIENT)
Dept: OTHER | Facility: HOSPITAL | Age: 64
End: 2019-09-23

## 2019-10-22 ENCOUNTER — TRANSCRIBE ORDERS (OUTPATIENT)
Dept: ADMINISTRATIVE | Facility: HOSPITAL | Age: 64
End: 2019-10-22

## 2019-10-22 ENCOUNTER — APPOINTMENT (OUTPATIENT)
Dept: LAB | Facility: HOSPITAL | Age: 64
End: 2019-10-22
Payer: MEDICARE

## 2019-10-22 DIAGNOSIS — R76.8 FALSE POSITIVE SEROLOGICAL TEST FOR SYPHILIS: ICD-10-CM

## 2019-10-22 DIAGNOSIS — R70.0 ELEVATED SEDIMENTATION RATE: ICD-10-CM

## 2019-10-22 DIAGNOSIS — R79.89 HYPOURICEMIA: ICD-10-CM

## 2019-10-22 DIAGNOSIS — M25.50 PAIN IN JOINT, MULTIPLE SITES: ICD-10-CM

## 2019-10-22 DIAGNOSIS — M25.50 PAIN IN JOINT, MULTIPLE SITES: Primary | ICD-10-CM

## 2019-10-22 LAB
ALBUMIN SERPL BCP-MCNC: 3.5 G/DL (ref 3.5–5)
ALP SERPL-CCNC: 58 U/L (ref 46–116)
ALT SERPL W P-5'-P-CCNC: 24 U/L (ref 12–78)
ANION GAP SERPL CALCULATED.3IONS-SCNC: 10 MMOL/L (ref 4–13)
AST SERPL W P-5'-P-CCNC: 17 U/L (ref 5–45)
BACTERIA UR QL AUTO: ABNORMAL /HPF
BASOPHILS # BLD AUTO: 0.03 THOUSANDS/ΜL (ref 0–0.1)
BASOPHILS NFR BLD AUTO: 1 % (ref 0–1)
BILIRUB SERPL-MCNC: 0.7 MG/DL (ref 0.2–1)
BILIRUB UR QL STRIP: NEGATIVE
BUN SERPL-MCNC: 11 MG/DL (ref 5–25)
CALCIUM SERPL-MCNC: 8.5 MG/DL (ref 8.3–10.1)
CHLORIDE SERPL-SCNC: 106 MMOL/L (ref 100–108)
CLARITY UR: CLEAR
CO2 SERPL-SCNC: 27 MMOL/L (ref 21–32)
COLOR UR: YELLOW
CREAT SERPL-MCNC: 1.04 MG/DL (ref 0.6–1.3)
CREAT UR-MCNC: 158 MG/DL
CRP SERPL QL: 6.2 MG/L
EOSINOPHIL # BLD AUTO: 0.18 THOUSAND/ΜL (ref 0–0.61)
EOSINOPHIL NFR BLD AUTO: 4 % (ref 0–6)
ERYTHROCYTE [DISTWIDTH] IN BLOOD BY AUTOMATED COUNT: 13.2 % (ref 11.6–15.1)
ERYTHROCYTE [SEDIMENTATION RATE] IN BLOOD: 24 MM/HOUR (ref 0–20)
GFR SERPL CREATININE-BSD FRML MDRD: 57 ML/MIN/1.73SQ M
GLUCOSE P FAST SERPL-MCNC: 111 MG/DL (ref 65–99)
GLUCOSE UR STRIP-MCNC: NEGATIVE MG/DL
HCT VFR BLD AUTO: 42.2 % (ref 34.8–46.1)
HGB BLD-MCNC: 13.2 G/DL (ref 11.5–15.4)
HGB UR QL STRIP.AUTO: ABNORMAL
IMM GRANULOCYTES # BLD AUTO: 0.01 THOUSAND/UL (ref 0–0.2)
IMM GRANULOCYTES NFR BLD AUTO: 0 % (ref 0–2)
KETONES UR STRIP-MCNC: NEGATIVE MG/DL
LEUKOCYTE ESTERASE UR QL STRIP: NEGATIVE
LYMPHOCYTES # BLD AUTO: 1.08 THOUSANDS/ΜL (ref 0.6–4.47)
LYMPHOCYTES NFR BLD AUTO: 21 % (ref 14–44)
MCH RBC QN AUTO: 29.5 PG (ref 26.8–34.3)
MCHC RBC AUTO-ENTMCNC: 31.3 G/DL (ref 31.4–37.4)
MCV RBC AUTO: 94 FL (ref 82–98)
MONOCYTES # BLD AUTO: 0.55 THOUSAND/ΜL (ref 0.17–1.22)
MONOCYTES NFR BLD AUTO: 11 % (ref 4–12)
MUCOUS THREADS UR QL AUTO: ABNORMAL
NEUTROPHILS # BLD AUTO: 3.32 THOUSANDS/ΜL (ref 1.85–7.62)
NEUTS SEG NFR BLD AUTO: 63 % (ref 43–75)
NITRITE UR QL STRIP: NEGATIVE
NON-SQ EPI CELLS URNS QL MICRO: ABNORMAL /HPF
NRBC BLD AUTO-RTO: 0 /100 WBCS
PH UR STRIP.AUTO: 6 [PH]
PLATELET # BLD AUTO: 264 THOUSANDS/UL (ref 149–390)
PMV BLD AUTO: 9.4 FL (ref 8.9–12.7)
POTASSIUM SERPL-SCNC: 3.8 MMOL/L (ref 3.5–5.3)
PROT SERPL-MCNC: 7.2 G/DL (ref 6.4–8.2)
PROT UR STRIP-MCNC: NEGATIVE MG/DL
PROT UR-MCNC: 24 MG/DL
PROT/CREAT UR: 0.15 MG/G{CREAT} (ref 0–0.1)
RBC # BLD AUTO: 4.48 MILLION/UL (ref 3.81–5.12)
RBC #/AREA URNS AUTO: ABNORMAL /HPF
SODIUM SERPL-SCNC: 143 MMOL/L (ref 136–145)
SP GR UR STRIP.AUTO: >=1.03 (ref 1–1.03)
UROBILINOGEN UR QL STRIP.AUTO: 0.2 E.U./DL
WBC # BLD AUTO: 5.17 THOUSAND/UL (ref 4.31–10.16)
WBC #/AREA URNS AUTO: ABNORMAL /HPF

## 2019-10-22 PROCEDURE — 80053 COMPREHEN METABOLIC PANEL: CPT

## 2019-10-22 PROCEDURE — 86140 C-REACTIVE PROTEIN: CPT

## 2019-10-22 PROCEDURE — 82570 ASSAY OF URINE CREATININE: CPT | Performed by: INTERNAL MEDICINE

## 2019-10-22 PROCEDURE — 81001 URINALYSIS AUTO W/SCOPE: CPT | Performed by: INTERNAL MEDICINE

## 2019-10-22 PROCEDURE — 85025 COMPLETE CBC W/AUTO DIFF WBC: CPT

## 2019-10-22 PROCEDURE — 84156 ASSAY OF PROTEIN URINE: CPT | Performed by: INTERNAL MEDICINE

## 2019-10-22 PROCEDURE — 86200 CCP ANTIBODY: CPT

## 2019-10-22 PROCEDURE — 85652 RBC SED RATE AUTOMATED: CPT

## 2019-10-22 PROCEDURE — 36415 COLL VENOUS BLD VENIPUNCTURE: CPT

## 2019-10-24 LAB — CCP IGA+IGG SERPL IA-ACNC: 17 UNITS (ref 0–19)

## 2019-12-11 ENCOUNTER — APPOINTMENT (OUTPATIENT)
Dept: LAB | Facility: HOSPITAL | Age: 64
End: 2019-12-11
Payer: MEDICARE

## 2019-12-11 ENCOUNTER — TRANSCRIBE ORDERS (OUTPATIENT)
Dept: ADMINISTRATIVE | Facility: HOSPITAL | Age: 64
End: 2019-12-11

## 2019-12-11 DIAGNOSIS — E78.5 HYPERLIPIDEMIA, UNSPECIFIED HYPERLIPIDEMIA TYPE: ICD-10-CM

## 2019-12-11 DIAGNOSIS — I10 ESSENTIAL HYPERTENSION, BENIGN: ICD-10-CM

## 2019-12-11 DIAGNOSIS — Z00.00 ROUTINE MEDICAL EXAM: ICD-10-CM

## 2019-12-11 DIAGNOSIS — I10 ESSENTIAL HYPERTENSION, BENIGN: Primary | ICD-10-CM

## 2019-12-11 DIAGNOSIS — E55.9 VITAMIN D DEFICIENCY DISEASE: ICD-10-CM

## 2019-12-11 LAB
25(OH)D3 SERPL-MCNC: 63.3 NG/ML (ref 30–100)
ALBUMIN SERPL BCP-MCNC: 3.6 G/DL (ref 3.5–5)
ALP SERPL-CCNC: 56 U/L (ref 46–116)
ALT SERPL W P-5'-P-CCNC: 23 U/L (ref 12–78)
ANION GAP SERPL CALCULATED.3IONS-SCNC: 7 MMOL/L (ref 4–13)
AST SERPL W P-5'-P-CCNC: 17 U/L (ref 5–45)
BASOPHILS # BLD AUTO: 0.03 THOUSANDS/ΜL (ref 0–0.1)
BASOPHILS NFR BLD AUTO: 1 % (ref 0–1)
BILIRUB SERPL-MCNC: 0.9 MG/DL (ref 0.2–1)
BUN SERPL-MCNC: 10 MG/DL (ref 5–25)
CALCIUM SERPL-MCNC: 8.7 MG/DL (ref 8.3–10.1)
CHLORIDE SERPL-SCNC: 107 MMOL/L (ref 100–108)
CHOLEST SERPL-MCNC: 261 MG/DL (ref 50–200)
CO2 SERPL-SCNC: 28 MMOL/L (ref 21–32)
CREAT SERPL-MCNC: 0.93 MG/DL (ref 0.6–1.3)
EOSINOPHIL # BLD AUTO: 0.17 THOUSAND/ΜL (ref 0–0.61)
EOSINOPHIL NFR BLD AUTO: 4 % (ref 0–6)
ERYTHROCYTE [DISTWIDTH] IN BLOOD BY AUTOMATED COUNT: 14 % (ref 11.6–15.1)
EST. AVERAGE GLUCOSE BLD GHB EST-MCNC: 126 MG/DL
GFR SERPL CREATININE-BSD FRML MDRD: 65 ML/MIN/1.73SQ M
GLUCOSE P FAST SERPL-MCNC: 107 MG/DL (ref 65–99)
HBA1C MFR BLD: 6 % (ref 4.2–6.3)
HCT VFR BLD AUTO: 42.8 % (ref 34.8–46.1)
HGB BLD-MCNC: 13.8 G/DL (ref 11.5–15.4)
IMM GRANULOCYTES # BLD AUTO: 0.01 THOUSAND/UL (ref 0–0.2)
IMM GRANULOCYTES NFR BLD AUTO: 0 % (ref 0–2)
LDLC SERPL DIRECT ASSAY-MCNC: 157 MG/DL (ref 0–100)
LYMPHOCYTES # BLD AUTO: 0.95 THOUSANDS/ΜL (ref 0.6–4.47)
LYMPHOCYTES NFR BLD AUTO: 20 % (ref 14–44)
MCH RBC QN AUTO: 29.9 PG (ref 26.8–34.3)
MCHC RBC AUTO-ENTMCNC: 32.2 G/DL (ref 31.4–37.4)
MCV RBC AUTO: 93 FL (ref 82–98)
MONOCYTES # BLD AUTO: 0.49 THOUSAND/ΜL (ref 0.17–1.22)
MONOCYTES NFR BLD AUTO: 10 % (ref 4–12)
NEUTROPHILS # BLD AUTO: 3.19 THOUSANDS/ΜL (ref 1.85–7.62)
NEUTS SEG NFR BLD AUTO: 65 % (ref 43–75)
NRBC BLD AUTO-RTO: 0 /100 WBCS
PLATELET # BLD AUTO: 268 THOUSANDS/UL (ref 149–390)
PMV BLD AUTO: 9 FL (ref 8.9–12.7)
POTASSIUM SERPL-SCNC: 3.7 MMOL/L (ref 3.5–5.3)
PROT SERPL-MCNC: 7.3 G/DL (ref 6.4–8.2)
RBC # BLD AUTO: 4.62 MILLION/UL (ref 3.81–5.12)
SODIUM SERPL-SCNC: 142 MMOL/L (ref 136–145)
TSH SERPL DL<=0.05 MIU/L-ACNC: 1.26 UIU/ML (ref 0.36–3.74)
WBC # BLD AUTO: 4.84 THOUSAND/UL (ref 4.31–10.16)

## 2019-12-11 PROCEDURE — 85025 COMPLETE CBC W/AUTO DIFF WBC: CPT

## 2019-12-11 PROCEDURE — 83721 ASSAY OF BLOOD LIPOPROTEIN: CPT

## 2019-12-11 PROCEDURE — 82465 ASSAY BLD/SERUM CHOLESTEROL: CPT

## 2019-12-11 PROCEDURE — 82306 VITAMIN D 25 HYDROXY: CPT

## 2019-12-11 PROCEDURE — 84443 ASSAY THYROID STIM HORMONE: CPT

## 2019-12-11 PROCEDURE — 36415 COLL VENOUS BLD VENIPUNCTURE: CPT

## 2019-12-11 PROCEDURE — 83036 HEMOGLOBIN GLYCOSYLATED A1C: CPT

## 2019-12-11 PROCEDURE — 80053 COMPREHEN METABOLIC PANEL: CPT

## 2020-01-06 ENCOUNTER — TRANSCRIBE ORDERS (OUTPATIENT)
Dept: ADMINISTRATIVE | Facility: HOSPITAL | Age: 65
End: 2020-01-06

## 2020-01-06 DIAGNOSIS — D05.11 INTRADUCTAL CARCINOMA IN SITU OF RIGHT BREAST: Primary | ICD-10-CM

## 2020-02-06 ENCOUNTER — HOSPITAL ENCOUNTER (OUTPATIENT)
Dept: MAMMOGRAPHY | Facility: HOSPITAL | Age: 65
Discharge: HOME | End: 2020-02-06
Payer: MEDICARE

## 2020-02-06 VITALS — WEIGHT: 179 LBS | HEIGHT: 61 IN | BODY MASS INDEX: 33.79 KG/M2

## 2020-02-06 DIAGNOSIS — Z85.3 PERSONAL HISTORY OF MALIGNANT NEOPLASM OF BREAST: ICD-10-CM

## 2020-02-06 DIAGNOSIS — D05.11 INTRADUCTAL CARCINOMA IN SITU OF RIGHT BREAST: ICD-10-CM

## 2020-02-06 PROCEDURE — 77066 DX MAMMO INCL CAD BI: CPT

## 2020-02-06 PROCEDURE — G0279 TOMOSYNTHESIS, MAMMO: HCPCS

## 2020-03-24 ENCOUNTER — TELEMEDICINE (OUTPATIENT)
Dept: UROLOGY | Facility: CLINIC | Age: 65
End: 2020-03-24
Payer: MEDICARE

## 2020-03-24 DIAGNOSIS — R31.29 MICROSCOPIC HEMATURIA: Primary | ICD-10-CM

## 2020-03-24 PROCEDURE — G2012 BRIEF CHECK IN BY MD/QHP: HCPCS | Performed by: PHYSICIAN ASSISTANT

## 2020-03-24 NOTE — PROGRESS NOTES
Virtual Regular Visit    Problem List Items Addressed This Visit        Genitourinary    Microscopic hematuria - Primary               Reason for visit is 6 month f/u microhematuria  Encounter provider Aggie Mcgraw PA-C    Provider located at [address] [place] [address] [place]   [address] [facility] [id number]      Recent Visits  No visits were found meeting these conditions  Showing recent visits within past 7 days and meeting all other requirements     Future Appointments  No visits were found meeting these conditions  Showing future appointments within next 150 days and meeting all other requirements        After connecting through telephone, the patient was identified by name and date of birth  Joe Luke was informed that this is a telemedicine visit and that the visit is being conducted through telephone which may not be secure and therefore, might not be HIPAA-compliant  My office door was closed  No one else was in the room  She acknowledged consent and understanding of privacy and security of the video platform  The patient has agreed to participate and understands they can discontinue the visit at any time  Subjective  Joe Luke is a 71 y o  female with microscopic hematuria for the past two years  She had negative CT urogram and cystoscopy one year ago  She has had persistent microscopic hematuria on her workup six months ago, and is now due for repeat UA and micro, not yet completed  Orders will be placed for her to have the stone this week  We are also considering nephrology evaluation given her autoimmune illness and concurrent use of Plaquenil, hydroxychloroquine and Fosamax  Urinary wise she feels she is doing pretty well  She does report that she has slow stream and does not void much volume at a time    She admits that she drinks hardly any water during the day, maybe 8 oz total   She has never had gross hematuria, no UTI symptoms        Past Medical History:   Diagnosis Date    Breast cancer ([address] ) 03/01/2017    right     Cancer [facility])     R breast     History of radiation therapy 08/01/2017    right breast       Past Surgical History:   Procedure Laterality Date    BREAST BIOPSY Left 1979    benign    BREAST CYST EXCISION Left 1976    benign    BREAST LUMPECTOMY Right 03/01/2017    BREAST SURGERY Bilateral     lumpectomy    CHOLECYSTECTOMY      COLONOSCOPY      ESOPHAGOGASTRODUODENOSCOPY      MD COLONOSCOPY FLX DX W/COLLJ SPEC WHEN PFRMD N/A 10/23/2017    Procedure: COLONOSCOPY;  Surgeon: Carlene Ely MD;  [facility]: [facility];  Service: Colorectal       Current Outpatient Medications   Medication Sig Dispense Refill    ALBUTEROL IN Inhale      anastrozole (ARIMIDEX) 1 mg tablet Take 1 mg by mouth daily      atorvastatin (LIPITOR) 10 mg tablet Take 10 mg by mouth daily      azelastine (ASTELIN) 0 1 % nasal spray 1 spray into each nostril daily Use in each nostril as directed      budesonide (ENTOCORT EC) 3 MG capsule Take 3 capsules (9 mg total) by mouth daily 90 capsule 2    Calcium Ascorbate 500 MG TABS Take 500 mg by mouth      Cetirizine HCl (ZYRTEC ALLERGY PO) Take by mouth daily      co-enzyme Q-10 30 MG capsule Take 30 mg by mouth 3 (three) times a day      doxycycline monohydrate (MONODOX) 100 mg capsule Take 100 mg by mouth 2 (two) times a day      Ergocalciferol (VITAMIN D2 PO) Take by mouth once a week      fluticasone (FLONASE) 50 mcg/act nasal spray 2 sprays into each nostril daily      hydroxychloroquine (PLAQUENIL) 200 mg tablet 200 mg  0    meloxicam (MOBIC) 15 mg tablet Take 15 mg by mouth daily      naproxen (NAPROSYN) 500 mg tablet Take 1 tablet by mouth every 8 (eight) hours as needed for moderate pain (Patient not taking: Reported on 2/25/2019) 20 tablet 0    ondansetron (ZOFRAN) 4 mg tablet Take 1 tablet by mouth every 4 (four) hours as needed for nausea or vomiting 10 tablet 0    simvastatin (ZOCOR) 20 mg tablet Take 20 mg by mouth daily at bedtime      TIMOLOL MALEATE OP Apply to eye daily      Turmeric 500 MG CAPS Take by mouth       No current facility-administered medications for this visit  Allergies   Allergen Reactions    Bactrim [Sulfamethoxazole-Trimethoprim] Anaphylaxis and Swelling     Swelling of throat and nose; itching    Other      clear tape _blisters    Pollen Extract Itching    Wound Dressings Rash       Review of Systems   Constitutional: Negative  Respiratory: Negative  Cardiovascular: Negative  Genitourinary: Positive for decreased urine volume  Negative for difficulty urinating, dysuria, flank pain, frequency, hematuria and urgency  Musculoskeletal: Negative  I spent 8 minutes with the patient during this visit

## 2020-05-21 ENCOUNTER — APPOINTMENT (OUTPATIENT)
Dept: LAB | Facility: HOSPITAL | Age: 65
End: 2020-05-21
Attending: FAMILY MEDICINE
Payer: MEDICARE

## 2020-05-21 ENCOUNTER — TRANSCRIBE ORDERS (OUTPATIENT)
Dept: ADMINISTRATIVE | Facility: HOSPITAL | Age: 65
End: 2020-05-21

## 2020-05-21 DIAGNOSIS — R76.8 FALSE POSITIVE SEROLOGICAL TEST FOR SYPHILIS: ICD-10-CM

## 2020-05-21 DIAGNOSIS — E55.9 VITAMIN D DEFICIENCY DISEASE: ICD-10-CM

## 2020-05-21 DIAGNOSIS — E78.5 HYPERLIPIDEMIA, UNSPECIFIED HYPERLIPIDEMIA TYPE: Primary | ICD-10-CM

## 2020-05-21 DIAGNOSIS — E78.5 HYPERLIPIDEMIA, UNSPECIFIED HYPERLIPIDEMIA TYPE: ICD-10-CM

## 2020-05-21 LAB
25(OH)D3 SERPL-MCNC: 59.4 NG/ML (ref 30–100)
ALBUMIN SERPL BCP-MCNC: 3.7 G/DL (ref 3.5–5)
ALP SERPL-CCNC: 53 U/L (ref 46–116)
ALT SERPL W P-5'-P-CCNC: 25 U/L (ref 12–78)
ANION GAP SERPL CALCULATED.3IONS-SCNC: 7 MMOL/L (ref 4–13)
AST SERPL W P-5'-P-CCNC: 15 U/L (ref 5–45)
BASOPHILS # BLD AUTO: 0.05 THOUSANDS/ΜL (ref 0–0.1)
BASOPHILS NFR BLD AUTO: 1 % (ref 0–1)
BILIRUB SERPL-MCNC: 1.2 MG/DL (ref 0.2–1)
BUN SERPL-MCNC: 12 MG/DL (ref 5–25)
CALCIUM SERPL-MCNC: 8.9 MG/DL (ref 8.3–10.1)
CHLORIDE SERPL-SCNC: 106 MMOL/L (ref 100–108)
CHOLEST SERPL-MCNC: 234 MG/DL (ref 50–200)
CO2 SERPL-SCNC: 28 MMOL/L (ref 21–32)
CREAT SERPL-MCNC: 1.06 MG/DL (ref 0.6–1.3)
CRP SERPL QL: 3.6 MG/L
EOSINOPHIL # BLD AUTO: 0.13 THOUSAND/ΜL (ref 0–0.61)
EOSINOPHIL NFR BLD AUTO: 3 % (ref 0–6)
ERYTHROCYTE [DISTWIDTH] IN BLOOD BY AUTOMATED COUNT: 13 % (ref 11.6–15.1)
ERYTHROCYTE [SEDIMENTATION RATE] IN BLOOD: 20 MM/HOUR (ref 0–20)
GFR SERPL CREATININE-BSD FRML MDRD: 55 ML/MIN/1.73SQ M
GLUCOSE P FAST SERPL-MCNC: 104 MG/DL (ref 65–99)
HCT VFR BLD AUTO: 43.1 % (ref 34.8–46.1)
HGB BLD-MCNC: 13.6 G/DL (ref 11.5–15.4)
IMM GRANULOCYTES # BLD AUTO: 0.01 THOUSAND/UL (ref 0–0.2)
IMM GRANULOCYTES NFR BLD AUTO: 0 % (ref 0–2)
LDLC SERPL DIRECT ASSAY-MCNC: 132 MG/DL (ref 0–100)
LYMPHOCYTES # BLD AUTO: 1.01 THOUSANDS/ΜL (ref 0.6–4.47)
LYMPHOCYTES NFR BLD AUTO: 20 % (ref 14–44)
MCH RBC QN AUTO: 30 PG (ref 26.8–34.3)
MCHC RBC AUTO-ENTMCNC: 31.6 G/DL (ref 31.4–37.4)
MCV RBC AUTO: 95 FL (ref 82–98)
MONOCYTES # BLD AUTO: 0.48 THOUSAND/ΜL (ref 0.17–1.22)
MONOCYTES NFR BLD AUTO: 10 % (ref 4–12)
NEUTROPHILS # BLD AUTO: 3.34 THOUSANDS/ΜL (ref 1.85–7.62)
NEUTS SEG NFR BLD AUTO: 66 % (ref 43–75)
NRBC BLD AUTO-RTO: 0 /100 WBCS
PLATELET # BLD AUTO: 272 THOUSANDS/UL (ref 149–390)
PMV BLD AUTO: 9 FL (ref 8.9–12.7)
POTASSIUM SERPL-SCNC: 3.6 MMOL/L (ref 3.5–5.3)
PROT SERPL-MCNC: 7.6 G/DL (ref 6.4–8.2)
RBC # BLD AUTO: 4.54 MILLION/UL (ref 3.81–5.12)
SODIUM SERPL-SCNC: 141 MMOL/L (ref 136–145)
TSH SERPL DL<=0.05 MIU/L-ACNC: 2.04 UIU/ML (ref 0.36–3.74)
WBC # BLD AUTO: 5.02 THOUSAND/UL (ref 4.31–10.16)

## 2020-05-21 PROCEDURE — 80053 COMPREHEN METABOLIC PANEL: CPT

## 2020-05-21 PROCEDURE — 84443 ASSAY THYROID STIM HORMONE: CPT

## 2020-05-21 PROCEDURE — 85652 RBC SED RATE AUTOMATED: CPT

## 2020-05-21 PROCEDURE — 86140 C-REACTIVE PROTEIN: CPT

## 2020-05-21 PROCEDURE — 36415 COLL VENOUS BLD VENIPUNCTURE: CPT

## 2020-05-21 PROCEDURE — 86039 ANTINUCLEAR ANTIBODIES (ANA): CPT

## 2020-05-21 PROCEDURE — 82306 VITAMIN D 25 HYDROXY: CPT

## 2020-05-21 PROCEDURE — 83721 ASSAY OF BLOOD LIPOPROTEIN: CPT

## 2020-05-21 PROCEDURE — 82465 ASSAY BLD/SERUM CHOLESTEROL: CPT

## 2020-05-21 PROCEDURE — 85025 COMPLETE CBC W/AUTO DIFF WBC: CPT

## 2020-05-21 PROCEDURE — 86038 ANTINUCLEAR ANTIBODIES: CPT

## 2020-05-22 LAB
ANA HOMOGEN SER QL IF: NORMAL
ANA HOMOGEN TITR SER: NORMAL {TITER}
RYE IGE QN: POSITIVE

## 2020-05-29 ENCOUNTER — APPOINTMENT (OUTPATIENT)
Dept: LAB | Facility: HOSPITAL | Age: 65
End: 2020-05-29
Payer: MEDICARE

## 2020-05-29 DIAGNOSIS — R31.29 MICROSCOPIC HEMATURIA: ICD-10-CM

## 2020-05-29 LAB
BACTERIA UR QL AUTO: ABNORMAL /HPF
BILIRUB UR QL STRIP: NEGATIVE
CLARITY UR: CLEAR
COLOR UR: YELLOW
GLUCOSE UR STRIP-MCNC: NEGATIVE MG/DL
HGB UR QL STRIP.AUTO: ABNORMAL
KETONES UR STRIP-MCNC: NEGATIVE MG/DL
LEUKOCYTE ESTERASE UR QL STRIP: ABNORMAL
NITRITE UR QL STRIP: NEGATIVE
NON-SQ EPI CELLS URNS QL MICRO: ABNORMAL /HPF
PH UR STRIP.AUTO: 5.5 [PH]
PROT UR STRIP-MCNC: NEGATIVE MG/DL
RBC #/AREA URNS AUTO: ABNORMAL /HPF
SP GR UR STRIP.AUTO: >=1.03 (ref 1–1.03)
UROBILINOGEN UR QL STRIP.AUTO: 0.2 E.U./DL
WBC #/AREA URNS AUTO: ABNORMAL /HPF

## 2020-05-29 PROCEDURE — 87086 URINE CULTURE/COLONY COUNT: CPT

## 2020-05-29 PROCEDURE — 81001 URINALYSIS AUTO W/SCOPE: CPT

## 2020-05-30 LAB — BACTERIA UR CULT: NORMAL

## 2020-06-01 ENCOUNTER — TELEPHONE (OUTPATIENT)
Dept: UROLOGY | Facility: CLINIC | Age: 65
End: 2020-06-01

## 2020-06-08 ENCOUNTER — OFFICE VISIT (OUTPATIENT)
Dept: FAMILY MEDICINE CLINIC | Facility: CLINIC | Age: 65
End: 2020-06-08
Payer: MEDICARE

## 2020-06-08 VITALS
WEIGHT: 177 LBS | RESPIRATION RATE: 20 BRPM | BODY MASS INDEX: 33.42 KG/M2 | DIASTOLIC BLOOD PRESSURE: 80 MMHG | HEIGHT: 61 IN | HEART RATE: 80 BPM | SYSTOLIC BLOOD PRESSURE: 126 MMHG

## 2020-06-08 DIAGNOSIS — C50.911 BILATERAL MALIGNANT NEOPLASM OF BREAST IN FEMALE, UNSPECIFIED ESTROGEN RECEPTOR STATUS, UNSPECIFIED SITE OF BREAST (HCC): ICD-10-CM

## 2020-06-08 DIAGNOSIS — C50.912 BILATERAL MALIGNANT NEOPLASM OF BREAST IN FEMALE, UNSPECIFIED ESTROGEN RECEPTOR STATUS, UNSPECIFIED SITE OF BREAST (HCC): ICD-10-CM

## 2020-06-08 DIAGNOSIS — M32.9 SYSTEMIC LUPUS ERYTHEMATOSUS, UNSPECIFIED SLE TYPE, UNSPECIFIED ORGAN INVOLVEMENT STATUS (HCC): ICD-10-CM

## 2020-06-08 DIAGNOSIS — Z91.09 HISTORY OF AIRBORNE ALLERGIES: ICD-10-CM

## 2020-06-08 DIAGNOSIS — E78.2 MIXED HYPERLIPIDEMIA: Primary | ICD-10-CM

## 2020-06-08 PROCEDURE — 1036F TOBACCO NON-USER: CPT | Performed by: FAMILY MEDICINE

## 2020-06-08 PROCEDURE — 3074F SYST BP LT 130 MM HG: CPT | Performed by: FAMILY MEDICINE

## 2020-06-08 PROCEDURE — 3008F BODY MASS INDEX DOCD: CPT | Performed by: FAMILY MEDICINE

## 2020-06-08 PROCEDURE — 99213 OFFICE O/P EST LOW 20 MIN: CPT | Performed by: FAMILY MEDICINE

## 2020-06-08 PROCEDURE — 3079F DIAST BP 80-89 MM HG: CPT | Performed by: FAMILY MEDICINE

## 2020-06-08 RX ORDER — CHOLESTYRAMINE 4 G/9G
POWDER, FOR SUSPENSION ORAL
COMMUNITY
Start: 2020-03-10 | End: 2020-06-08 | Stop reason: SDUPTHER

## 2020-06-08 RX ORDER — DULOXETIN HYDROCHLORIDE 30 MG/1
30 CAPSULE, DELAYED RELEASE ORAL DAILY
Qty: 90 CAPSULE | Refills: 1 | Status: SHIPPED | OUTPATIENT
Start: 2020-06-08 | End: 2020-07-01 | Stop reason: ALTCHOICE

## 2020-06-08 RX ORDER — CHOLESTYRAMINE 4 G/9G
1 POWDER, FOR SUSPENSION ORAL 2 TIMES DAILY WITH MEALS
Qty: 180 PACKET | Refills: 1 | Status: SHIPPED | OUTPATIENT
Start: 2020-06-08 | End: 2020-12-14

## 2020-06-08 RX ORDER — ALENDRONATE SODIUM 70 MG/1
70 TABLET ORAL WEEKLY
COMMUNITY
Start: 2019-07-26 | End: 2021-12-20 | Stop reason: ALTCHOICE

## 2020-06-08 RX ORDER — HYDROXYCHLOROQUINE SULFATE 200 MG/1
TABLET, FILM COATED ORAL
COMMUNITY
Start: 2019-07-15 | End: 2020-06-08

## 2020-07-01 ENCOUNTER — CONSULT (OUTPATIENT)
Dept: NEPHROLOGY | Facility: CLINIC | Age: 65
End: 2020-07-01
Payer: MEDICARE

## 2020-07-01 VITALS
HEIGHT: 61 IN | WEIGHT: 174 LBS | BODY MASS INDEX: 32.85 KG/M2 | TEMPERATURE: 99 F | DIASTOLIC BLOOD PRESSURE: 74 MMHG | HEART RATE: 75 BPM | SYSTOLIC BLOOD PRESSURE: 126 MMHG

## 2020-07-01 DIAGNOSIS — R31.29 MICROSCOPIC HEMATURIA: ICD-10-CM

## 2020-07-01 DIAGNOSIS — R19.4 CHANGE IN BOWEL HABITS: ICD-10-CM

## 2020-07-01 DIAGNOSIS — M35.1 MIXED CONNECTIVE TISSUE DISEASE (HCC): ICD-10-CM

## 2020-07-01 DIAGNOSIS — R06.00 DYSPNEA, UNSPECIFIED TYPE: ICD-10-CM

## 2020-07-01 DIAGNOSIS — R31.21 ASYMPTOMATIC MICROSCOPIC HEMATURIA: Primary | ICD-10-CM

## 2020-07-01 PROCEDURE — 99204 OFFICE O/P NEW MOD 45 MIN: CPT | Performed by: INTERNAL MEDICINE

## 2020-07-01 NOTE — ASSESSMENT & PLAN NOTE
She has asymptomatic microscopic hematuria dating back to 2014 on records review  She had a normal cystoscopy last year and a CT of her abdomen demonstrated normal size kidneys  All urinalyses were done without in midstream collection which will be done  She may be contaminated by vaginal lining cells  She does have an elevated speckled LUCINA 1-12 80 suggesting mixed connective tissue disease  She is currently taking Plaquenil and tolerating it well at 200 mg daily for the past year  She does take Advil 200 mg daily which I have asked her to stop  If she does not have a rheumatologic disorder contributing to hematuria would consider a genetic condition  Most likely would be IgA nephropathy, thin basement membrane disease and Balkan nephropathy  These would be confirmed by kidney biopsy which is of no utility  In this clinical situation  Will recheck urine using a midstream collection  Obtained an ultrasound of the kidneys and bladder    She will avoid Advil or other nonsteroidal anti-inflammatory agents and just use Tylenol as needed will order additional rheumatologic labs  Will see her back and thank you for allowing me to participate in her care

## 2020-07-01 NOTE — PROGRESS NOTES
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   Did have radiation therapy to the right breast  She has a history of it irritable bowel syndrome and takes cholestyramine as needed  She has a history of osteoporosis on alendronate once a week    UA 5/29/20  1 030  PH 5 5 small blood neg protein WBC 2-4  RBC 1-2    11/17 - UA - small blood  4-14 moderate blood in urine    LUCINA speckled 1:1280  Creat 1 06  eGFR 55    CRP  3 6 -  Was 7 8  Last year    Is of [place]  descent    PCR 0 15    CT 2-2019 no urolithiasis, renal parenchymal masses, urothelial masses or obstructive uropathy    Cystoscopy 2-19  Was negative for bladder pathology    The following portions of the patient's history were reviewed and updated as appropriate: allergies, current medications, past family history, past medical history, past social history, past surgical history and problem list     Review of Systems   Constitutional: Positive for fatigue  Negative for chills  HENT: Positive for hearing loss  Eyes: Negative for visual disturbance  Respiratory: Negative for cough, chest tightness and shortness of breath (SHEPPARD)  Cardiovascular: Positive for palpitations (occasional at rest) and leg swelling  Negative for chest pain  Gastrointestinal:        Occasional abdominal pain, and diarrhea   Genitourinary: Negative for decreased urine volume, difficulty urinating and dysuria  Musculoskeletal: Positive for myalgias     Skin: Itchy skin no rashes   Neurological: Positive for weakness  Negative for dizziness           Social History     Socioeconomic History    Marital status: /Civil Union     Spouse name: Not on file    Number of children: Not on file    Years of education: Not on file    Highest education level: Not on file   Occupational History    Not on file   Social Needs    Financial resource strain: Not on file    Food insecurity:     Worry: Not on file     Inability: Not on file    Transportation needs:     Medical: Not on file     Non-medical: Not on file   Tobacco Use    Smoking status: Never Smoker    Smokeless tobacco: Never Used   Substance and Sexual Activity    Alcohol use: Not Currently     Comment: rarely    Drug use: No    Sexual activity: Not on file   Lifestyle    Physical activity:     Days per week: Not on file     Minutes per session: Not on file    Stress: Not on file   Relationships    Social connections:     Talks on phone: Not on file     Gets together: Not on file     Attends [employer] service: Not on file     Active member of club or organization: Not on file     Attends meetings of clubs or organizations: Not on file     Relationship status: Not on file    Intimate partner violence:     Fear of current or ex partner: Not on file     Emotionally abused: Not on file     Physically abused: Not on file     Forced sexual activity: Not on file   Other Topics Concern    Not on file   Social History Narrative    Not on file     Past Medical History:   Diagnosis Date    Breast cancer ([facility] [address] ) 03/01/2017    right     Cancer [facility])     R breast     History of radiation therapy 08/01/2017    right breast     Past Surgical History:   Procedure Laterality Date    BREAST BIOPSY Left 1979    benign    BREAST CYST EXCISION Left 1976    benign    BREAST LUMPECTOMY Right 03/01/2017    BREAST SURGERY Bilateral     lumpectomy    CHOLECYSTECTOMY      COLONOSCOPY      ESOPHAGOGASTRODUODENOSCOPY      MS COLONOSCOPY FLX DX W/COLLJ SPEC WHEN PFRMD N/A 10/23/2017    Procedure: COLONOSCOPY;  Surgeon: Lashawn Parish MD;  [facility]: [facility];  Service: Colorectal       Current Outpatient Medications:     alendronate (FOSAMAX) 70 mg tablet, Take 70 mg by mouth Once a week, Disp: , Rfl:     azelastine (ASTELIN) 0 1 % nasal spray, 1 spray into each nostril daily Use in each nostril as directed, Disp: , Rfl:     Calcium Ascorbate 500 MG TABS, Take 500 mg by mouth, Disp: , Rfl:     cholestyramine (QUESTRAN) 4 g packet, Take 1 packet (4 g total) by mouth 2 (two) times a day with meals, Disp: 180 packet, Rfl: 1    co-enzyme Q-10 30 MG capsule, Take 30 mg by mouth 3 (three) times a day, Disp: , Rfl:     Ergocalciferol (VITAMIN D2 PO), Take by mouth once a week, Disp: , Rfl:     fluticasone (FLONASE) 50 mcg/act nasal spray, 2 sprays into each nostril daily, Disp: , Rfl:     hydroxychloroquine (PLAQUENIL) 200 mg tablet, 200 mg, Disp: , Rfl: 0    Multiple Vitamin (MULTI-VITAMIN DAILY PO), Take 500 mg by mouth daily , Disp: , Rfl:     TIMOLOL MALEATE OP, Apply to eye daily, Disp: , Rfl:     Turmeric 500 MG CAPS, Take by mouth, Disp: , Rfl:     Lab Results   Component Value Date     10/03/2015    SODIUM 141 05/21/2020    K 3 6 05/21/2020     05/21/2020    CO2 28 05/21/2020    ANIONGAP 10 10/03/2015    AGAP 7 05/21/2020    BUN 12 05/21/2020    CREATININE 1 06 05/21/2020    GLUC 154 (H) 05/19/2019    GLUF 104 (H) 05/21/2020    CALCIUM 8 9 05/21/2020    AST 15 05/21/2020    ALT 25 05/21/2020    ALKPHOS 53 05/21/2020    PROT 7 5 10/03/2015    TP 7 6 05/21/2020    BILITOT 1 14 (H) 10/03/2015    TBILI 1 20 (H) 05/21/2020    EGFR 55 05/21/2020     Lab Results   Component Value Date    WBC 5 02 05/21/2020    HGB 13 6 05/21/2020    HCT 43 1 05/21/2020    MCV 95 05/21/2020     05/21/2020     Lab Results   Component Value Date    CHOLESTEROL 234 (H) 05/21/2020    CHOLESTEROL 261 (H) 12/11/2019    CHOLESTEROL 166 08/11/2019     Lab Results   Component Value Date    HDL 73 (H) 04/09/2019    HDL 69 (H) 11/10/2018    HDL 75 (H) 10/03/2017     Lab Results   Component Value Date    LDLCALC 147 (H) 04/09/2019    LDLCALC 156 (H) 11/10/2018    LDLCALC 101 (H) 10/03/2017     Lab Results   Component Value Date    TRIG 178 (H) 04/09/2019    TRIG 111 11/10/2018    TRIG 102 10/03/2017     No results found for: CHOLHDL  Lab Results   Component Value Date    GGN0DBQAGNRC 2 040 05/21/2020     Lab Results   Component Value Date    CALCIUM 8 9 05/21/2020     No results found for: SPEP, UPEP  No results found for: MICROALBUR, [id number]        Objective:      /74 (BP Location: Left arm, Patient Position: Sitting, Cuff Size: Standard)   Pulse 75   Temp 99 °F (37 2 °C) (Tympanic)   Ht 5' 1" (1 549 m)   Wt 78 9 kg (174 lb)   LMP  (LMP Unknown)   BMI 32 88 kg/m²          Physical Exam   Constitutional: She is oriented to person, place, and time  She appears well-developed and well-nourished  No distress  HENT:   Head: Normocephalic and atraumatic  Right Ear: External ear normal    Left Ear: External ear normal    Eyes: Pupils are equal, round, and reactive to light  Neck: Normal range of motion  No JVD present  Cardiovascular: Normal rate and regular rhythm  No murmur heard  Pulmonary/Chest: Effort normal and breath sounds normal  No respiratory distress  Abdominal: Soft  Bowel sounds are normal    Musculoskeletal: Normal range of motion  She exhibits edema  Neurological: She is alert and oriented to person, place, and time  Skin: Skin is warm and dry  Psychiatric: She has a normal mood and affect   Her behavior is normal  Judgment and thought content normal

## 2020-07-06 ENCOUNTER — HOSPITAL ENCOUNTER (OUTPATIENT)
Dept: ULTRASOUND IMAGING | Facility: HOSPITAL | Age: 65
Discharge: HOME | End: 2020-07-06
Attending: INTERNAL MEDICINE
Payer: MEDICARE

## 2020-07-06 DIAGNOSIS — R31.21 ASYMPTOMATIC MICROSCOPIC HEMATURIA: ICD-10-CM

## 2020-07-06 PROCEDURE — 76770 US EXAM ABDO BACK WALL COMP: CPT

## 2020-07-12 ENCOUNTER — APPOINTMENT (OUTPATIENT)
Dept: LAB | Facility: HOSPITAL | Age: 65
End: 2020-07-12
Attending: INTERNAL MEDICINE
Payer: MEDICARE

## 2020-07-12 DIAGNOSIS — R31.21 ASYMPTOMATIC MICROSCOPIC HEMATURIA: ICD-10-CM

## 2020-07-12 LAB
ALBUMIN SERPL BCP-MCNC: 3.6 G/DL (ref 3.5–5)
ALP SERPL-CCNC: 55 U/L (ref 46–116)
ALT SERPL W P-5'-P-CCNC: 16 U/L (ref 12–78)
ANION GAP SERPL CALCULATED.3IONS-SCNC: 8 MMOL/L (ref 4–13)
AST SERPL W P-5'-P-CCNC: 15 U/L (ref 5–45)
BACTERIA UR QL AUTO: ABNORMAL /HPF
BILIRUB SERPL-MCNC: 1 MG/DL (ref 0.2–1)
BILIRUB UR QL STRIP: NEGATIVE
BUN SERPL-MCNC: 13 MG/DL (ref 5–25)
CALCIUM SERPL-MCNC: 8.7 MG/DL (ref 8.3–10.1)
CHLORIDE SERPL-SCNC: 104 MMOL/L (ref 100–108)
CLARITY UR: CLEAR
CO2 SERPL-SCNC: 28 MMOL/L (ref 21–32)
COLOR UR: YELLOW
CREAT SERPL-MCNC: 1.05 MG/DL (ref 0.6–1.3)
CREAT UR-MCNC: 115 MG/DL
CREAT UR-MCNC: 115 MG/DL
GFR SERPL CREATININE-BSD FRML MDRD: 56 ML/MIN/1.73SQ M
GLUCOSE P FAST SERPL-MCNC: 113 MG/DL (ref 65–99)
GLUCOSE UR STRIP-MCNC: NEGATIVE MG/DL
HGB UR QL STRIP.AUTO: ABNORMAL
IGA SERPL-MCNC: 233 MG/DL (ref 70–400)
KETONES UR STRIP-MCNC: NEGATIVE MG/DL
LEUKOCYTE ESTERASE UR QL STRIP: NEGATIVE
MICROALBUMIN UR-MCNC: 11.6 MG/L (ref 0–20)
MICROALBUMIN/CREAT 24H UR: 10 MG/G CREATININE (ref 0–30)
NITRITE UR QL STRIP: NEGATIVE
NON-SQ EPI CELLS URNS QL MICRO: ABNORMAL /HPF
PH UR STRIP.AUTO: 5.5 [PH]
POTASSIUM SERPL-SCNC: 3.8 MMOL/L (ref 3.5–5.3)
PROT SERPL-MCNC: 7.5 G/DL (ref 6.4–8.2)
PROT UR STRIP-MCNC: NEGATIVE MG/DL
PROT UR-MCNC: 23 MG/DL
PROT/CREAT UR: 0.2 MG/G{CREAT} (ref 0–0.1)
RBC #/AREA URNS AUTO: ABNORMAL /HPF
SODIUM SERPL-SCNC: 140 MMOL/L (ref 136–145)
SP GR UR STRIP.AUTO: >=1.03 (ref 1–1.03)
URATE SERPL-MCNC: 3.7 MG/DL (ref 2–6.8)
UROBILINOGEN UR QL STRIP.AUTO: 0.2 E.U./DL
WBC #/AREA URNS AUTO: ABNORMAL /HPF

## 2020-07-12 PROCEDURE — 84550 ASSAY OF BLOOD/URIC ACID: CPT

## 2020-07-12 PROCEDURE — 82784 ASSAY IGA/IGD/IGG/IGM EACH: CPT

## 2020-07-12 PROCEDURE — 36415 COLL VENOUS BLD VENIPUNCTURE: CPT

## 2020-07-12 PROCEDURE — 86235 NUCLEAR ANTIGEN ANTIBODY: CPT

## 2020-07-12 PROCEDURE — 86215 DEOXYRIBONUCLEASE ANTIBODY: CPT

## 2020-07-12 PROCEDURE — 81001 URINALYSIS AUTO W/SCOPE: CPT

## 2020-07-12 PROCEDURE — 82570 ASSAY OF URINE CREATININE: CPT

## 2020-07-12 PROCEDURE — 82043 UR ALBUMIN QUANTITATIVE: CPT

## 2020-07-12 PROCEDURE — 80053 COMPREHEN METABOLIC PANEL: CPT

## 2020-07-12 PROCEDURE — 84156 ASSAY OF PROTEIN URINE: CPT

## 2020-07-14 LAB
ENA RNP AB SER-ACNC: <0.2 AI (ref 0–0.9)
ENA SM AB SER-ACNC: 0.2 AI (ref 0–0.9)
ENA SS-A AB SER-ACNC: <0.2 AI (ref 0–0.9)
ENA SS-B AB SER-ACNC: <0.2 AI (ref 0–0.9)

## 2020-07-15 LAB — STREP DNASE B SER-ACNC: 87 U/ML (ref 0–120)

## 2020-07-16 ENCOUNTER — HOSPITAL ENCOUNTER (OUTPATIENT)
Dept: NON INVASIVE DIAGNOSTICS | Facility: HOSPITAL | Age: 65
Discharge: HOME | End: 2020-07-16
Attending: INTERNAL MEDICINE
Payer: MEDICARE

## 2020-07-16 DIAGNOSIS — R06.00 DYSPNEA, UNSPECIFIED TYPE: ICD-10-CM

## 2020-07-16 PROCEDURE — 93306 TTE W/DOPPLER COMPLETE: CPT | Performed by: INTERNAL MEDICINE

## 2020-07-16 PROCEDURE — 93306 TTE W/DOPPLER COMPLETE: CPT

## 2020-07-29 ENCOUNTER — OFFICE VISIT (OUTPATIENT)
Dept: NEPHROLOGY | Facility: CLINIC | Age: 65
End: 2020-07-29
Payer: MEDICARE

## 2020-07-29 VITALS
DIASTOLIC BLOOD PRESSURE: 76 MMHG | OXYGEN SATURATION: 98 % | HEIGHT: 61 IN | TEMPERATURE: 98.4 F | SYSTOLIC BLOOD PRESSURE: 122 MMHG | BODY MASS INDEX: 32.66 KG/M2 | HEART RATE: 76 BPM | WEIGHT: 173 LBS

## 2020-07-29 DIAGNOSIS — E78.00 PURE HYPERCHOLESTEROLEMIA: ICD-10-CM

## 2020-07-29 DIAGNOSIS — R31.29 MICROSCOPIC HEMATURIA: Primary | ICD-10-CM

## 2020-07-29 DIAGNOSIS — R73.9 HYPERGLYCEMIA: ICD-10-CM

## 2020-07-29 DIAGNOSIS — R06.00 DYSPNEA, UNSPECIFIED TYPE: ICD-10-CM

## 2020-07-29 DIAGNOSIS — M35.1 MIXED CONNECTIVE TISSUE DISEASE (HCC): ICD-10-CM

## 2020-07-29 DIAGNOSIS — N18.30 STAGE 3 CHRONIC KIDNEY DISEASE (HCC): ICD-10-CM

## 2020-07-29 PROCEDURE — 99214 OFFICE O/P EST MOD 30 MIN: CPT | Performed by: INTERNAL MEDICINE

## 2020-07-29 RX ORDER — TIMOLOL MALEATE 5 MG/ML
1 SOLUTION/ DROPS OPHTHALMIC EVERY 12 HOURS
COMMUNITY
Start: 2020-05-12

## 2020-07-29 NOTE — ASSESSMENT & PLAN NOTE
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  I explained what this was using an anatomic drawing  She has had a history of frequent urinary tract infection infections in the past but none recently  She has no family history of kidney disease but stated that her blood mother had an anatomic bladder abnormality  I did a connective tissue workup for lupus and Sjogren syndrome which were negative  She does have an elevated C-reactive protein which has improved and she is on hydroxychloroquine  Etiologies for hematuria include caliectasis causing hematuria,  Associated with frequent prior urinary tract infections    She may have thin basement membrane disease but it is seems less likely  She does not have any micro albuminuria which would exclude a glomerular etiology or vasculitic etiology

## 2020-07-29 NOTE — PATIENT INSTRUCTIONS
Avoid NSAID's Can use Tylenol  Call promptly and drink water for a UTI  You have pelvocaliectasis which  Is mild

## 2020-07-29 NOTE — PROGRESS NOTES
[address] [facility] [place], [place]    Name: Dieter Fernández  YOB: 1955      Assessment/Plan:           Problem List Items Addressed This Visit        Genitourinary    Microscopic hematuria - Primary     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  I explained what this was using an anatomic drawing  She has had a history of frequent urinary tract infection infections in the past but none recently  She has no family history of kidney disease but stated that her blood mother had an anatomic bladder abnormality  I did a connective tissue workup for lupus and Sjogren syndrome which were negative  She does have an elevated C-reactive protein which has improved and she is on hydroxychloroquine  Etiologies for hematuria include caliectasis causing hematuria,  Associated with frequent prior urinary tract infections  She may have thin basement membrane disease but it is seems less likely  She does not have any micro albuminuria which would exclude a glomerular etiology or vasculitic etiology               Relevant Orders    CBC and differential    Comprehensive metabolic panel    LDL cholesterol, direct    Microalbumin / creatinine urine ratio    Protein / creatinine ratio, urine    Uric acid    Urinalysis with microscopic    Stage 3 chronic kidney disease (HCC)     Creatinine has been running approximately 1 mg  This translates to an EGFR 55-56 mils per minute  She did have a normal GE GFR last year greater than 65 mils per minute  I have reviewed her medication list and there is no potential nephrotoxins  She has adequate kidney function for normal dose of hydroxychloroquine  I advised her to avoid Advil and Aleve  Will supply her with a list of kidney safe OTC medications  Urged her to recheck her LDL cholesterol as it is over 100 to avoid plaquing of the renal blood vessels    Her fasting blood sugar was elevated and suggested an A1c Other    Mixed connective tissue disease ([facility] [address] )     Follows with Dr Bella Alexandre of theumatology         Relevant Orders    CBC and differential    Comprehensive metabolic panel    LDL cholesterol, direct    Microalbumin / creatinine urine ratio    Protein / creatinine ratio, urine    Uric acid    Urinalysis with microscopic      Other Visit Diagnoses     Dyspnea, unspecified type        Relevant Orders    CBC and differential    Comprehensive metabolic panel    LDL cholesterol, direct    Microalbumin / creatinine urine ratio    Protein / creatinine ratio, urine    Uric acid    Urinalysis with microscopic    Pure hypercholesterolemia        Relevant Orders    LDL cholesterol, direct    Hyperglycemia        Relevant Orders    Hemoglobin A1C            Subjective:      Patient ID: Dieter Fernández is a 71 y o  female  HPI Referred by urology for asymptomatic microhematuria  The following portions of the patient's history were reviewed and updated as appropriate: allergies, current medications, past family history, past medical history, past social history, past surgical history and problem list     Review of Systems   Constitutional: Negative for activity change, appetite change, fatigue and fever  HENT: Negative  Eyes: Negative for visual disturbance  Respiratory: Positive for shortness of breath  Negative for cough  Cardiovascular: Negative for chest pain and leg swelling  Gastrointestinal: Negative for abdominal pain, diarrhea, nausea and vomiting  Genitourinary: Positive for hematuria  Negative for decreased urine volume, difficulty urinating and dysuria  Musculoskeletal: Positive for arthralgias and gait problem  Skin: Negative for color change  Neurological: Negative for dizziness and weakness  Hematological: Does not bruise/bleed easily           Social History     Socioeconomic History    Marital status: /Civil Union     Spouse name: None    Number of children: None    Years of education: None    Highest education level: None   Occupational History    None   Social Needs    Financial resource strain: None    Food insecurity:     Worry: None     Inability: None    Transportation needs:     Medical: None     Non-medical: None   Tobacco Use    Smoking status: Never Smoker    Smokeless tobacco: Never Used   Substance and Sexual Activity    Alcohol use: Not Currently     Comment: rarely    Drug use: No    Sexual activity: None   Lifestyle    Physical activity:     Days per week: None     Minutes per session: None    Stress: None   Relationships    Social connections:     Talks on phone: None     Gets together: None     Attends Orthodoxy service: None     Active member of club or organization: None     Attends meetings of clubs or organizations: None     Relationship status: None    Intimate partner violence:     Fear of current or ex partner: None     Emotionally abused: None     Physically abused: None     Forced sexual activity: None   Other Topics Concern    None   Social History Narrative    None     Past Medical History:   Diagnosis Date    Breast cancer ([facility] [address] ) 03/01/2017    right     Cancer ([facility] [address] )     R breast     History of radiation therapy 08/01/2017    right breast     Past Surgical History:   Procedure Laterality Date    BREAST BIOPSY Left 1979    benign    BREAST CYST EXCISION Left 1976    benign    BREAST LUMPECTOMY Right 03/01/2017    BREAST SURGERY Bilateral     lumpectomy    CHOLECYSTECTOMY      COLONOSCOPY      ESOPHAGOGASTRODUODENOSCOPY      [place] COLONOSCOPY FLX DX W/COLLJ SPEC WHEN PFRMD N/A 10/23/2017    Procedure: COLONOSCOPY;  Surgeon: Griselda Garcia MD;  Location: [facility];  Service: Colorectal       Current Outpatient Medications:     azelastine (ASTELIN) 0 1 % nasal spray, 1 spray into each nostril daily Use in each nostril as directed, Disp: , Rfl:     Calcium Ascorbate 500 MG TABS, Take 500 mg by mouth, Disp: , Rfl:     cholestyramine (QUESTRAN) 4 g packet, Take 1 packet (4 g total) by mouth 2 (two) times a day with meals, Disp: 180 packet, Rfl: 1    co-enzyme Q-10 30 MG capsule, Take 30 mg by mouth 3 (three) times a day, Disp: , Rfl:     Ergocalciferol (VITAMIN D2 PO), Take by mouth once a week, Disp: , Rfl:     fluticasone (FLONASE) 50 mcg/act nasal spray, 2 sprays into each nostril daily, Disp: , Rfl:     hydroxychloroquine (PLAQUENIL) 200 mg tablet, 200 mg, Disp: , Rfl: 0    Multiple Vitamin (MULTI-VITAMIN DAILY PO), Take 500 mg by mouth daily , Disp: , Rfl:     timolol (TIMOPTIC) 0 5 % ophthalmic solution, Administer 1 drop to both eyes every 12 (twelve) hours, Disp: , Rfl:     TIMOLOL MALEATE OP, Apply to eye daily, Disp: , Rfl:     Turmeric 500 MG CAPS, Take by mouth, Disp: , Rfl:     alendronate (FOSAMAX) 70 mg tablet, Take 70 mg by mouth Once a week, Disp: , Rfl:     Lab Results   Component Value Date     10/03/2015    SODIUM 140 07/12/2020    K 3 8 07/12/2020     07/12/2020    CO2 28 07/12/2020    ANIONGAP 10 10/03/2015    AGAP 8 07/12/2020    BUN 13 07/12/2020    CREATININE 1 05 07/12/2020    GLUC 154 (H) 05/19/2019    GLUF 113 (H) 07/12/2020    CALCIUM 8 7 07/12/2020    AST 15 07/12/2020    ALT 16 07/12/2020    ALKPHOS 55 07/12/2020    PROT 7 5 10/03/2015    TP 7 5 07/12/2020    BILITOT 1 14 (H) 10/03/2015    TBILI 1 00 07/12/2020    EGFR 56 07/12/2020     Lab Results   Component Value Date    WBC 5 02 05/21/2020    HGB 13 6 05/21/2020    HCT 43 1 05/21/2020    MCV 95 05/21/2020     05/21/2020     Lab Results   Component Value Date    CHOLESTEROL 234 (H) 05/21/2020    CHOLESTEROL 261 (H) 12/11/2019    CHOLESTEROL 166 08/11/2019     Lab Results   Component Value Date    HDL 73 (H) 04/09/2019    HDL 69 (H) 11/10/2018    HDL 75 (H) 10/03/2017     Lab Results   Component Value Date    LDLCALC 147 (H) 04/09/2019    LDLCALC 156 (H) 11/10/2018    LDLCALC 101 (H) 10/03/2017     Lab Results   Component Value Date TRIG 178 (H) 04/09/2019    TRIG 111 11/10/2018    TRIG 102 10/03/2017     No results found for: [place], [place]  Lab Results   Component Value Date    [id number] 2 040 05/21/2020     Lab Results   Component Value Date    CALCIUM 8 7 07/12/2020     No results found for: SPEP, UPEP  No results found for: WILDER HURST4HUR        Objective:      /76 (BP Location: Left arm, Patient Position: Sitting, Cuff Size: Standard)   Pulse 76   Temp 98 4 °F (36 9 °C) (Tympanic)   Ht 5' 1" (1 549 m)   Wt 78 5 kg (173 lb)   LMP  (LMP Unknown)   SpO2 98%   BMI 32 69 kg/m²          Physical Exam   Constitutional: She is oriented to person, place, and time  She appears well-developed and well-nourished  No distress  HENT:   Head: Normocephalic and atraumatic  Eyes: Pupils are equal, round, and reactive to light  Neck: Normal range of motion  Neck supple  No JVD present  Cardiovascular: Normal rate, regular rhythm and normal heart sounds  No murmur heard  Pulmonary/Chest: Effort normal and breath sounds normal  No respiratory distress  Abdominal: Soft  Bowel sounds are normal  She exhibits no distension  There is no tenderness  Musculoskeletal: She exhibits no edema  Neurological: She is alert and oriented to person, place, and time  Skin: Skin is warm and dry  She is not diaphoretic  No erythema  Psychiatric: She has a normal mood and affect   Her behavior is normal  Judgment and thought content normal

## 2020-07-29 NOTE — ASSESSMENT & PLAN NOTE
Creatinine has been running approximately 1 mg  This translates to an EGFR 55-56 mils per minute  She did have a normal GE GFR last year greater than 65 mils per minute  I have reviewed her medication list and there is no potential nephrotoxins  She has adequate kidney function for normal dose of hydroxychloroquine  I advised her to avoid Advil and Aleve  Will supply her with a list of kidney safe OTC medications  Urged her to recheck her LDL cholesterol as it is over 100 to avoid plaquing of the renal blood vessels    Her fasting blood sugar was elevated and suggested an A1c

## 2020-09-24 ENCOUNTER — OFFICE VISIT (OUTPATIENT)
Dept: FAMILY MEDICINE CLINIC | Facility: CLINIC | Age: 65
End: 2020-09-24
Payer: MEDICARE

## 2020-09-24 VITALS
DIASTOLIC BLOOD PRESSURE: 84 MMHG | RESPIRATION RATE: 20 BRPM | SYSTOLIC BLOOD PRESSURE: 138 MMHG | HEART RATE: 68 BPM | BODY MASS INDEX: 33.07 KG/M2 | TEMPERATURE: 98.6 F | WEIGHT: 175 LBS

## 2020-09-24 DIAGNOSIS — M35.1 MIXED CONNECTIVE TISSUE DISEASE (HCC): ICD-10-CM

## 2020-09-24 DIAGNOSIS — E66.09 CLASS 1 OBESITY DUE TO EXCESS CALORIES WITH SERIOUS COMORBIDITY AND BODY MASS INDEX (BMI) OF 33.0 TO 33.9 IN ADULT: ICD-10-CM

## 2020-09-24 DIAGNOSIS — N18.30 STAGE 3 CHRONIC KIDNEY DISEASE (HCC): ICD-10-CM

## 2020-09-24 DIAGNOSIS — L50.9 HIVES: Primary | ICD-10-CM

## 2020-09-24 PROCEDURE — 99213 OFFICE O/P EST LOW 20 MIN: CPT | Performed by: FAMILY MEDICINE

## 2020-09-24 RX ORDER — METHYLPREDNISOLONE 4 MG/1
TABLET ORAL
Qty: 21 EACH | Refills: 0 | Status: SHIPPED | OUTPATIENT
Start: 2020-09-24 | End: 2020-11-16 | Stop reason: ALTCHOICE

## 2020-09-24 RX ORDER — LORATADINE 10 MG/1
10 TABLET ORAL DAILY
Qty: 30 TABLET | Refills: 0 | Status: SHIPPED | OUTPATIENT
Start: 2020-09-24

## 2020-09-24 RX ORDER — FAMOTIDINE 20 MG/1
20 TABLET, FILM COATED ORAL 2 TIMES DAILY
Qty: 60 TABLET | Refills: 0 | Status: SHIPPED | OUTPATIENT
Start: 2020-09-24 | End: 2021-06-04 | Stop reason: ALTCHOICE

## 2020-09-24 NOTE — PROGRESS NOTES
Assessment/Plan:    Problem List Items Addressed This Visit     None           There are no diagnoses linked to this encounter  No problem-specific Assessment & Plan notes found for this encounter  PHQ-9 Depression Screening    PHQ-9:    Frequency of the following problems over the past two weeks: Body mass index is 33 07 kg/m²  BMI Counseling: Body mass index is 33 07 kg/m²  The BMI   Subjective:      Patient ID: Bonita Mena is a 76 y o  female  Patient presents with a generalized red itchy rash that has been occurring for 2 weeks      The following portions of the patient's history were reviewed and updated as appropriate:   She has a past medical history of Breast cancer ([facility] [address] ) (03/01/2017), [facility]), and History of radiation therapy (08/01/2017)  ,  does not have any pertinent problems on file  ,   has a past surgical history that includes Cholecystectomy; Breast surgery (Bilateral); Colonoscopy; Esophagogastroduodenoscopy; pr colonoscopy flx dx w/collj spec when pfrmd (N/A, 10/23/2017); Breast lumpectomy (Right, 03/01/2017); Breast cyst excision (Left, 1976); and Breast biopsy (Left, 1979)  ,  family history includes BRCA1 Negative in her sister; BRCA2 Negative in her sister; Breast cancer (age of onset: 48) in her cousin; Breast cancer (age of onset: 64) in her sister; Breast cancer (age of onset: 76) in her maternal grandmother; Uterine cancer in her sister  ,   reports that she has never smoked  She has never used smokeless tobacco  She reports previous alcohol use  She reports that she does not use drugs  ,  is allergic to bactrim [sulfamethoxazole-trimethoprim]; other; molds & smuts; pollen extract; and wound dressings     Current Outpatient Medications   Medication Sig Dispense Refill    cholestyramine (QUESTRAN) 4 g packet Take 1 packet (4 g total) by mouth 2 (two) times a day with meals (Patient taking differently: Take 1 packet by mouth 3 (three) times a day ) 180 packet 1  alendronate (FOSAMAX) 70 mg tablet Take 70 mg by mouth Once a week      azelastine (ASTELIN) 0 1 % nasal spray 1 spray into each nostril daily Use in each nostril as directed      Calcium Ascorbate 500 MG TABS Take 500 mg by mouth      co-enzyme Q-10 30 MG capsule Take 30 mg by mouth 3 (three) times a day      Ergocalciferol (VITAMIN D2 PO) Take by mouth once a week      fluticasone (FLONASE) 50 mcg/act nasal spray 2 sprays into each nostril daily      hydroxychloroquine (PLAQUENIL) 200 mg tablet 200 mg  0    Multiple Vitamin (MULTI-VITAMIN DAILY PO) Take 500 mg by mouth daily       timolol (TIMOPTIC) 0 5 % ophthalmic solution Administer 1 drop to both eyes every 12 (twelve) hours      TIMOLOL MALEATE OP Apply to eye daily      Turmeric 500 MG CAPS Take by mouth       No current facility-administered medications for this visit  Review of Systems   Constitutional: Negative  HENT: Negative  Eyes: Negative  Respiratory: Negative  Cardiovascular: Negative  Gastrointestinal: Negative  Endocrine: Negative  Genitourinary: Negative  Musculoskeletal: Negative  Skin: Positive for rash  Red itchy generalized rash with dermatographia   Allergic/Immunologic: Negative  Neurological: Negative  Hematological: Negative  Psychiatric/Behavioral: Negative  Objective:    /84   Pulse 68   Temp 98 6 °F (37 °C)   Resp 20   Wt 79 4 kg (175 lb)   LMP  (LMP Unknown)   BMI 33 07 kg/m²   Body mass index is 33 07 kg/m²  Physical Exam  Constitutional:       Appearance: She is well-developed  HENT:      Head: Normocephalic  Eyes:      Pupils: Pupils are equal, round, and reactive to light  Neck:      Musculoskeletal: Normal range of motion  Cardiovascular:      Rate and Rhythm: Normal rate and regular rhythm  Heart sounds: Normal heart sounds  Pulmonary:      Effort: Pulmonary effort is normal       Breath sounds: Normal breath sounds  Abdominal:      General: Bowel sounds are normal       Palpations: Abdomen is soft  Tenderness: There is no abdominal tenderness  Skin:     General: Skin is warm  Findings: Erythema and rash present  Comments: Generalize rash of arms and legs red with dermatographia   Neurological:      Mental Status: She is alert and oriented to person, place, and time

## 2020-10-06 ENCOUNTER — APPOINTMENT (OUTPATIENT)
Dept: LAB | Facility: HOSPITAL | Age: 65
End: 2020-10-06
Payer: MEDICARE

## 2020-10-06 ENCOUNTER — TRANSCRIBE ORDERS (OUTPATIENT)
Dept: ADMINISTRATIVE | Facility: HOSPITAL | Age: 65
End: 2020-10-06

## 2020-10-06 DIAGNOSIS — M81.0 OSTEOPOROSIS, UNSPECIFIED OSTEOPOROSIS TYPE, UNSPECIFIED PATHOLOGICAL FRACTURE PRESENCE: Primary | ICD-10-CM

## 2020-10-06 DIAGNOSIS — R76.8 ANA POSITIVE: ICD-10-CM

## 2020-10-06 DIAGNOSIS — M81.0 OSTEOPOROSIS, UNSPECIFIED OSTEOPOROSIS TYPE, UNSPECIFIED PATHOLOGICAL FRACTURE PRESENCE: ICD-10-CM

## 2020-10-06 LAB
25(OH)D3 SERPL-MCNC: 34.5 NG/ML (ref 30–100)
ALBUMIN SERPL BCP-MCNC: 3.4 G/DL (ref 3.5–5)
ALP SERPL-CCNC: 55 U/L (ref 46–116)
ALT SERPL W P-5'-P-CCNC: 20 U/L (ref 12–78)
ANION GAP SERPL CALCULATED.3IONS-SCNC: 8 MMOL/L (ref 4–13)
AST SERPL W P-5'-P-CCNC: 14 U/L (ref 5–45)
BACTERIA UR QL AUTO: ABNORMAL /HPF
BASOPHILS # BLD AUTO: 0.04 THOUSANDS/ΜL (ref 0–0.1)
BASOPHILS NFR BLD AUTO: 1 % (ref 0–1)
BILIRUB SERPL-MCNC: 1.1 MG/DL (ref 0.2–1)
BILIRUB UR QL STRIP: NEGATIVE
BUN SERPL-MCNC: 11 MG/DL (ref 5–25)
C3 SERPL-MCNC: 167 MG/DL (ref 90–180)
C4 SERPL-MCNC: 40 MG/DL (ref 10–40)
CALCIUM ALBUM COR SERPL-MCNC: 9.1 MG/DL (ref 8.3–10.1)
CALCIUM SERPL-MCNC: 8.6 MG/DL (ref 8.3–10.1)
CHLORIDE SERPL-SCNC: 105 MMOL/L (ref 100–108)
CLARITY UR: CLEAR
CO2 SERPL-SCNC: 28 MMOL/L (ref 21–32)
COLOR UR: YELLOW
CREAT SERPL-MCNC: 0.98 MG/DL (ref 0.6–1.3)
CREAT UR-MCNC: 137 MG/DL
CRP SERPL QL: 6.7 MG/L
EOSINOPHIL # BLD AUTO: 0.16 THOUSAND/ΜL (ref 0–0.61)
EOSINOPHIL NFR BLD AUTO: 3 % (ref 0–6)
ERYTHROCYTE [DISTWIDTH] IN BLOOD BY AUTOMATED COUNT: 14.1 % (ref 11.6–15.1)
ERYTHROCYTE [SEDIMENTATION RATE] IN BLOOD: 31 MM/HOUR (ref 0–29)
GFR SERPL CREATININE-BSD FRML MDRD: 60 ML/MIN/1.73SQ M
GLUCOSE P FAST SERPL-MCNC: 104 MG/DL (ref 65–99)
GLUCOSE UR STRIP-MCNC: NEGATIVE MG/DL
HCT VFR BLD AUTO: 42.7 % (ref 34.8–46.1)
HGB BLD-MCNC: 13.7 G/DL (ref 11.5–15.4)
HGB UR QL STRIP.AUTO: ABNORMAL
IMM GRANULOCYTES # BLD AUTO: 0.02 THOUSAND/UL (ref 0–0.2)
IMM GRANULOCYTES NFR BLD AUTO: 0 % (ref 0–2)
KETONES UR STRIP-MCNC: NEGATIVE MG/DL
LEUKOCYTE ESTERASE UR QL STRIP: NEGATIVE
LYMPHOCYTES # BLD AUTO: 1.13 THOUSANDS/ΜL (ref 0.6–4.47)
LYMPHOCYTES NFR BLD AUTO: 18 % (ref 14–44)
MCH RBC QN AUTO: 30.6 PG (ref 26.8–34.3)
MCHC RBC AUTO-ENTMCNC: 32.1 G/DL (ref 31.4–37.4)
MCV RBC AUTO: 96 FL (ref 82–98)
MONOCYTES # BLD AUTO: 0.54 THOUSAND/ΜL (ref 0.17–1.22)
MONOCYTES NFR BLD AUTO: 9 % (ref 4–12)
NEUTROPHILS # BLD AUTO: 4.49 THOUSANDS/ΜL (ref 1.85–7.62)
NEUTS SEG NFR BLD AUTO: 69 % (ref 43–75)
NITRITE UR QL STRIP: NEGATIVE
NON-SQ EPI CELLS URNS QL MICRO: ABNORMAL /HPF
NRBC BLD AUTO-RTO: 0 /100 WBCS
PH UR STRIP.AUTO: 5.5 [PH]
PLATELET # BLD AUTO: 289 THOUSANDS/UL (ref 149–390)
PMV BLD AUTO: 8.7 FL (ref 8.9–12.7)
POTASSIUM SERPL-SCNC: 3.5 MMOL/L (ref 3.5–5.3)
PROT SERPL-MCNC: 7.2 G/DL (ref 6.4–8.2)
PROT UR STRIP-MCNC: NEGATIVE MG/DL
PROT UR-MCNC: 25 MG/DL
PROT/CREAT UR: 0.18 MG/G{CREAT} (ref 0–0.1)
RBC # BLD AUTO: 4.47 MILLION/UL (ref 3.81–5.12)
RBC #/AREA URNS AUTO: ABNORMAL /HPF
SODIUM SERPL-SCNC: 141 MMOL/L (ref 136–145)
SP GR UR STRIP.AUTO: >=1.03 (ref 1–1.03)
UROBILINOGEN UR QL STRIP.AUTO: 0.2 E.U./DL
WBC # BLD AUTO: 6.38 THOUSAND/UL (ref 4.31–10.16)
WBC #/AREA URNS AUTO: ABNORMAL /HPF

## 2020-10-06 PROCEDURE — 85652 RBC SED RATE AUTOMATED: CPT

## 2020-10-06 PROCEDURE — 82306 VITAMIN D 25 HYDROXY: CPT

## 2020-10-06 PROCEDURE — 86140 C-REACTIVE PROTEIN: CPT

## 2020-10-06 PROCEDURE — 80053 COMPREHEN METABOLIC PANEL: CPT

## 2020-10-06 PROCEDURE — 85025 COMPLETE CBC W/AUTO DIFF WBC: CPT

## 2020-10-06 PROCEDURE — 86225 DNA ANTIBODY NATIVE: CPT

## 2020-10-06 PROCEDURE — 84156 ASSAY OF PROTEIN URINE: CPT | Performed by: INTERNAL MEDICINE

## 2020-10-06 PROCEDURE — 81001 URINALYSIS AUTO W/SCOPE: CPT | Performed by: INTERNAL MEDICINE

## 2020-10-06 PROCEDURE — 86160 COMPLEMENT ANTIGEN: CPT

## 2020-10-06 PROCEDURE — 82570 ASSAY OF URINE CREATININE: CPT | Performed by: INTERNAL MEDICINE

## 2020-10-06 PROCEDURE — 36415 COLL VENOUS BLD VENIPUNCTURE: CPT

## 2020-10-07 LAB — DSDNA AB SER-ACNC: 1 IU/ML (ref 0–9)

## 2020-10-08 ENCOUNTER — TELEPHONE (OUTPATIENT)
Dept: FAMILY MEDICINE CLINIC | Facility: CLINIC | Age: 65
End: 2020-10-08

## 2020-10-08 DIAGNOSIS — L30.9 DERMATITIS: Primary | ICD-10-CM

## 2020-10-23 ENCOUNTER — TELEPHONE (OUTPATIENT)
Dept: FAMILY MEDICINE CLINIC | Facility: CLINIC | Age: 65
End: 2020-10-23

## 2020-10-29 ENCOUNTER — OFFICE VISIT (OUTPATIENT)
Dept: FAMILY MEDICINE CLINIC | Facility: CLINIC | Age: 65
End: 2020-10-29
Payer: MEDICARE

## 2020-10-29 VITALS
HEIGHT: 61 IN | RESPIRATION RATE: 20 BRPM | BODY MASS INDEX: 32.47 KG/M2 | DIASTOLIC BLOOD PRESSURE: 68 MMHG | WEIGHT: 172 LBS | SYSTOLIC BLOOD PRESSURE: 134 MMHG | HEART RATE: 68 BPM | TEMPERATURE: 98 F

## 2020-10-29 DIAGNOSIS — M35.1 MIXED CONNECTIVE TISSUE DISEASE (HCC): ICD-10-CM

## 2020-10-29 DIAGNOSIS — C50.912 BILATERAL MALIGNANT NEOPLASM OF BREAST IN FEMALE, UNSPECIFIED ESTROGEN RECEPTOR STATUS, UNSPECIFIED SITE OF BREAST (HCC): ICD-10-CM

## 2020-10-29 DIAGNOSIS — C50.911 BILATERAL MALIGNANT NEOPLASM OF BREAST IN FEMALE, UNSPECIFIED ESTROGEN RECEPTOR STATUS, UNSPECIFIED SITE OF BREAST (HCC): ICD-10-CM

## 2020-10-29 DIAGNOSIS — R31.29 MICROSCOPIC HEMATURIA: ICD-10-CM

## 2020-10-29 DIAGNOSIS — E66.09 CLASS 1 OBESITY DUE TO EXCESS CALORIES WITH SERIOUS COMORBIDITY AND BODY MASS INDEX (BMI) OF 33.0 TO 33.9 IN ADULT: ICD-10-CM

## 2020-10-29 DIAGNOSIS — E78.2 MIXED HYPERLIPIDEMIA: ICD-10-CM

## 2020-10-29 DIAGNOSIS — M32.9 SYSTEMIC LUPUS ERYTHEMATOSUS, UNSPECIFIED SLE TYPE, UNSPECIFIED ORGAN INVOLVEMENT STATUS (HCC): ICD-10-CM

## 2020-10-29 DIAGNOSIS — N18.31 STAGE 3A CHRONIC KIDNEY DISEASE (HCC): Primary | ICD-10-CM

## 2020-10-29 DIAGNOSIS — M94.0 COSTOCHONDRITIS: ICD-10-CM

## 2020-10-29 PROCEDURE — 99214 OFFICE O/P EST MOD 30 MIN: CPT | Performed by: FAMILY MEDICINE

## 2020-10-29 RX ORDER — METHYLPREDNISOLONE 4 MG/1
TABLET ORAL
Qty: 21 EACH | Refills: 0 | Status: SHIPPED | OUTPATIENT
Start: 2020-10-29 | End: 2020-11-16 | Stop reason: ALTCHOICE

## 2020-11-16 ENCOUNTER — OFFICE VISIT (OUTPATIENT)
Dept: BARIATRICS | Facility: CLINIC | Age: 65
End: 2020-11-16
Payer: MEDICARE

## 2020-11-16 VITALS
TEMPERATURE: 97.3 F | HEIGHT: 61 IN | OXYGEN SATURATION: 98 % | WEIGHT: 170.2 LBS | DIASTOLIC BLOOD PRESSURE: 70 MMHG | SYSTOLIC BLOOD PRESSURE: 122 MMHG | HEART RATE: 74 BPM | BODY MASS INDEX: 32.13 KG/M2

## 2020-11-16 DIAGNOSIS — R73.9 HYPERGLYCEMIA: ICD-10-CM

## 2020-11-16 DIAGNOSIS — Z91.89 AT RISK FOR SLEEP APNEA: ICD-10-CM

## 2020-11-16 DIAGNOSIS — R51.9 FREQUENT HEADACHES: ICD-10-CM

## 2020-11-16 DIAGNOSIS — R06.83 SNORING: ICD-10-CM

## 2020-11-16 DIAGNOSIS — E66.9 CLASS 1 OBESITY: Primary | ICD-10-CM

## 2020-11-16 PROBLEM — E66.811 CLASS 1 OBESITY: Status: ACTIVE | Noted: 2020-11-16

## 2020-11-16 PROCEDURE — 99214 OFFICE O/P EST MOD 30 MIN: CPT | Performed by: PHYSICIAN ASSISTANT

## 2020-11-30 ENCOUNTER — OFFICE VISIT (OUTPATIENT)
Dept: BARIATRICS | Facility: CLINIC | Age: 65
End: 2020-11-30

## 2020-11-30 VITALS — WEIGHT: 170 LBS | HEIGHT: 61 IN | BODY MASS INDEX: 32.1 KG/M2

## 2020-11-30 DIAGNOSIS — R63.5 ABNORMAL WEIGHT GAIN: Primary | ICD-10-CM

## 2020-11-30 PROCEDURE — WMPRO12

## 2020-11-30 PROCEDURE — RECHECK

## 2020-12-01 ENCOUNTER — LAB (OUTPATIENT)
Dept: LAB | Facility: HOSPITAL | Age: 65
End: 2020-12-01
Attending: PHYSICIAN ASSISTANT
Payer: MEDICARE

## 2020-12-01 DIAGNOSIS — E66.9 CLASS 1 OBESITY: ICD-10-CM

## 2020-12-01 DIAGNOSIS — R06.83 SNORING: ICD-10-CM

## 2020-12-01 DIAGNOSIS — R73.9 HYPERGLYCEMIA: ICD-10-CM

## 2020-12-01 DIAGNOSIS — E78.2 MIXED HYPERLIPIDEMIA: ICD-10-CM

## 2020-12-01 DIAGNOSIS — M32.9 SYSTEMIC LUPUS ERYTHEMATOSUS, UNSPECIFIED SLE TYPE, UNSPECIFIED ORGAN INVOLVEMENT STATUS (HCC): ICD-10-CM

## 2020-12-01 DIAGNOSIS — R51.9 FREQUENT HEADACHES: ICD-10-CM

## 2020-12-01 LAB
ALBUMIN SERPL BCP-MCNC: 3.5 G/DL (ref 3.5–5)
ALP SERPL-CCNC: 51 U/L (ref 46–116)
ALT SERPL W P-5'-P-CCNC: 34 U/L (ref 12–78)
ANION GAP SERPL CALCULATED.3IONS-SCNC: 9 MMOL/L (ref 4–13)
AST SERPL W P-5'-P-CCNC: 19 U/L (ref 5–45)
BASOPHILS # BLD AUTO: 0.04 THOUSANDS/ΜL (ref 0–0.1)
BASOPHILS NFR BLD AUTO: 1 % (ref 0–1)
BILIRUB SERPL-MCNC: 1.1 MG/DL (ref 0.2–1)
BUN SERPL-MCNC: 10 MG/DL (ref 5–25)
CALCIUM SERPL-MCNC: 8.4 MG/DL (ref 8.3–10.1)
CHLORIDE SERPL-SCNC: 105 MMOL/L (ref 100–108)
CHOLEST SERPL-MCNC: 186 MG/DL (ref 50–200)
CO2 SERPL-SCNC: 28 MMOL/L (ref 21–32)
CREAT SERPL-MCNC: 0.89 MG/DL (ref 0.6–1.3)
EOSINOPHIL # BLD AUTO: 0.13 THOUSAND/ΜL (ref 0–0.61)
EOSINOPHIL NFR BLD AUTO: 3 % (ref 0–6)
ERYTHROCYTE [DISTWIDTH] IN BLOOD BY AUTOMATED COUNT: 13.8 % (ref 11.6–15.1)
EST. AVERAGE GLUCOSE BLD GHB EST-MCNC: 117 MG/DL
GFR SERPL CREATININE-BSD FRML MDRD: 68 ML/MIN/1.73SQ M
GLUCOSE P FAST SERPL-MCNC: 111 MG/DL (ref 65–99)
HBA1C MFR BLD: 5.7 %
HCT VFR BLD AUTO: 42.1 % (ref 34.8–46.1)
HDLC SERPL-MCNC: 76 MG/DL
HGB BLD-MCNC: 13.6 G/DL (ref 11.5–15.4)
IMM GRANULOCYTES # BLD AUTO: 0.01 THOUSAND/UL (ref 0–0.2)
IMM GRANULOCYTES NFR BLD AUTO: 0 % (ref 0–2)
INSULIN SERPL-ACNC: 6.5 MU/L (ref 3–25)
LDLC SERPL CALC-MCNC: 91 MG/DL (ref 0–100)
LYMPHOCYTES # BLD AUTO: 1.04 THOUSANDS/ΜL (ref 0.6–4.47)
LYMPHOCYTES NFR BLD AUTO: 22 % (ref 14–44)
MCH RBC QN AUTO: 30.6 PG (ref 26.8–34.3)
MCHC RBC AUTO-ENTMCNC: 32.3 G/DL (ref 31.4–37.4)
MCV RBC AUTO: 95 FL (ref 82–98)
MONOCYTES # BLD AUTO: 0.49 THOUSAND/ΜL (ref 0.17–1.22)
MONOCYTES NFR BLD AUTO: 11 % (ref 4–12)
NEUTROPHILS # BLD AUTO: 2.96 THOUSANDS/ΜL (ref 1.85–7.62)
NEUTS SEG NFR BLD AUTO: 63 % (ref 43–75)
NONHDLC SERPL-MCNC: 110 MG/DL
NRBC BLD AUTO-RTO: 0 /100 WBCS
PLATELET # BLD AUTO: 273 THOUSANDS/UL (ref 149–390)
PMV BLD AUTO: 9.2 FL (ref 8.9–12.7)
POTASSIUM SERPL-SCNC: 3.4 MMOL/L (ref 3.5–5.3)
PROT SERPL-MCNC: 6.8 G/DL (ref 6.4–8.2)
RBC # BLD AUTO: 4.44 MILLION/UL (ref 3.81–5.12)
SODIUM SERPL-SCNC: 142 MMOL/L (ref 136–145)
TRIGL SERPL-MCNC: 96 MG/DL
WBC # BLD AUTO: 4.67 THOUSAND/UL (ref 4.31–10.16)

## 2020-12-01 PROCEDURE — 83525 ASSAY OF INSULIN: CPT

## 2020-12-01 PROCEDURE — 83036 HEMOGLOBIN GLYCOSYLATED A1C: CPT

## 2020-12-01 PROCEDURE — 80053 COMPREHEN METABOLIC PANEL: CPT

## 2020-12-01 PROCEDURE — 85025 COMPLETE CBC W/AUTO DIFF WBC: CPT

## 2020-12-01 PROCEDURE — 80061 LIPID PANEL: CPT

## 2020-12-01 PROCEDURE — 36415 COLL VENOUS BLD VENIPUNCTURE: CPT

## 2020-12-04 ENCOUNTER — TELEPHONE (OUTPATIENT)
Dept: BARIATRICS | Facility: CLINIC | Age: 65
End: 2020-12-04

## 2020-12-07 ENCOUNTER — TELEMEDICINE (OUTPATIENT)
Dept: BARIATRICS | Facility: CLINIC | Age: 65
End: 2020-12-07

## 2020-12-07 ENCOUNTER — CLINICAL SUPPORT (OUTPATIENT)
Dept: BARIATRICS | Facility: CLINIC | Age: 65
End: 2020-12-07

## 2020-12-07 VITALS — HEIGHT: 61 IN | BODY MASS INDEX: 32.13 KG/M2 | WEIGHT: 170.2 LBS

## 2020-12-07 DIAGNOSIS — R63.5 ABNORMAL WEIGHT GAIN: Primary | ICD-10-CM

## 2020-12-07 PROCEDURE — RECHECK

## 2020-12-08 ENCOUNTER — OFFICE VISIT (OUTPATIENT)
Dept: FAMILY MEDICINE CLINIC | Facility: CLINIC | Age: 65
End: 2020-12-08
Payer: MEDICARE

## 2020-12-08 VITALS
RESPIRATION RATE: 20 BRPM | BODY MASS INDEX: 31.72 KG/M2 | DIASTOLIC BLOOD PRESSURE: 84 MMHG | HEART RATE: 84 BPM | HEIGHT: 61 IN | SYSTOLIC BLOOD PRESSURE: 132 MMHG | TEMPERATURE: 98 F | WEIGHT: 168 LBS

## 2020-12-08 DIAGNOSIS — C50.911 BILATERAL MALIGNANT NEOPLASM OF BREAST IN FEMALE, UNSPECIFIED ESTROGEN RECEPTOR STATUS, UNSPECIFIED SITE OF BREAST (HCC): ICD-10-CM

## 2020-12-08 DIAGNOSIS — M32.9 SYSTEMIC LUPUS ERYTHEMATOSUS, UNSPECIFIED SLE TYPE, UNSPECIFIED ORGAN INVOLVEMENT STATUS (HCC): Primary | ICD-10-CM

## 2020-12-08 DIAGNOSIS — R19.4 CHANGE IN BOWEL HABITS: ICD-10-CM

## 2020-12-08 DIAGNOSIS — N18.31 STAGE 3A CHRONIC KIDNEY DISEASE (HCC): ICD-10-CM

## 2020-12-08 DIAGNOSIS — E66.09 CLASS 1 OBESITY DUE TO EXCESS CALORIES WITH SERIOUS COMORBIDITY AND BODY MASS INDEX (BMI) OF 33.0 TO 33.9 IN ADULT: ICD-10-CM

## 2020-12-08 DIAGNOSIS — C50.912 BILATERAL MALIGNANT NEOPLASM OF BREAST IN FEMALE, UNSPECIFIED ESTROGEN RECEPTOR STATUS, UNSPECIFIED SITE OF BREAST (HCC): ICD-10-CM

## 2020-12-08 DIAGNOSIS — M35.1 MIXED CONNECTIVE TISSUE DISEASE (HCC): ICD-10-CM

## 2020-12-08 PROCEDURE — 99214 OFFICE O/P EST MOD 30 MIN: CPT | Performed by: FAMILY MEDICINE

## 2020-12-12 DIAGNOSIS — E78.2 MIXED HYPERLIPIDEMIA: ICD-10-CM

## 2020-12-14 ENCOUNTER — CLINICAL SUPPORT (OUTPATIENT)
Dept: BARIATRICS | Facility: CLINIC | Age: 65
End: 2020-12-14

## 2020-12-14 ENCOUNTER — OFFICE VISIT (OUTPATIENT)
Dept: BARIATRICS | Facility: CLINIC | Age: 65
End: 2020-12-14

## 2020-12-14 VITALS — HEIGHT: 61 IN | BODY MASS INDEX: 32.28 KG/M2 | WEIGHT: 171 LBS

## 2020-12-14 VITALS — HEIGHT: 61 IN | BODY MASS INDEX: 32.31 KG/M2

## 2020-12-14 DIAGNOSIS — R63.5 ABNORMAL WEIGHT GAIN: Primary | ICD-10-CM

## 2020-12-14 PROCEDURE — RECHECK

## 2020-12-14 RX ORDER — CHOLESTYRAMINE 4 G/9G
2 POWDER, FOR SUSPENSION ORAL 2 TIMES DAILY WITH MEALS
Qty: 180 PACKET | Refills: 4 | Status: SHIPPED | OUTPATIENT
Start: 2020-12-14 | End: 2021-09-08 | Stop reason: ALTCHOICE

## 2020-12-28 ENCOUNTER — TELEPHONE (OUTPATIENT)
Dept: BARIATRICS | Facility: CLINIC | Age: 65
End: 2020-12-28

## 2020-12-28 ENCOUNTER — CLINICAL SUPPORT (OUTPATIENT)
Dept: BARIATRICS | Facility: CLINIC | Age: 65
End: 2020-12-28

## 2020-12-28 VITALS — HEIGHT: 61 IN | BODY MASS INDEX: 32.31 KG/M2

## 2020-12-28 DIAGNOSIS — R63.5 ABNORMAL WEIGHT GAIN: Primary | ICD-10-CM

## 2020-12-28 PROCEDURE — RECHECK

## 2020-12-28 NOTE — TELEPHONE ENCOUNTER
Alejandro called and wanted to speak with Fransisca  She saw her Primary Doctor and Ophthalmologist and she stated that they were not happy with the Wellbutrin  She last seen Fransisca 11/16/2020

## 2021-01-15 ENCOUNTER — TRANSCRIBE ORDERS (OUTPATIENT)
Dept: ADMINISTRATIVE | Facility: HOSPITAL | Age: 66
End: 2021-01-15

## 2021-01-15 DIAGNOSIS — Z12.31 ENCOUNTER FOR SCREENING MAMMOGRAM FOR MALIGNANT NEOPLASM OF BREAST: Primary | ICD-10-CM

## 2021-01-22 ENCOUNTER — APPOINTMENT (OUTPATIENT)
Dept: LAB | Facility: HOSPITAL | Age: 66
End: 2021-01-22
Attending: INTERNAL MEDICINE
Payer: MEDICARE

## 2021-01-22 DIAGNOSIS — E78.00 PURE HYPERCHOLESTEROLEMIA: ICD-10-CM

## 2021-01-22 DIAGNOSIS — R73.9 HYPERGLYCEMIA: ICD-10-CM

## 2021-01-22 DIAGNOSIS — M35.1 MIXED CONNECTIVE TISSUE DISEASE (HCC): ICD-10-CM

## 2021-01-22 DIAGNOSIS — R31.29 MICROSCOPIC HEMATURIA: ICD-10-CM

## 2021-01-22 DIAGNOSIS — R06.00 DYSPNEA, UNSPECIFIED TYPE: ICD-10-CM

## 2021-01-22 LAB
ALBUMIN SERPL BCP-MCNC: 3.6 G/DL (ref 3.5–5)
ALP SERPL-CCNC: 54 U/L (ref 46–116)
ALT SERPL W P-5'-P-CCNC: 28 U/L (ref 12–78)
ANION GAP SERPL CALCULATED.3IONS-SCNC: 9 MMOL/L (ref 4–13)
AST SERPL W P-5'-P-CCNC: 19 U/L (ref 5–45)
BACTERIA UR QL AUTO: ABNORMAL /HPF
BASOPHILS # BLD AUTO: 0.02 THOUSANDS/ΜL (ref 0–0.1)
BASOPHILS NFR BLD AUTO: 0 % (ref 0–1)
BILIRUB SERPL-MCNC: 1 MG/DL (ref 0.2–1)
BILIRUB UR QL STRIP: NEGATIVE
BUN SERPL-MCNC: 9 MG/DL (ref 5–25)
CALCIUM SERPL-MCNC: 8.9 MG/DL (ref 8.3–10.1)
CHLORIDE SERPL-SCNC: 106 MMOL/L (ref 100–108)
CLARITY UR: CLEAR
CO2 SERPL-SCNC: 28 MMOL/L (ref 21–32)
COLOR UR: YELLOW
CREAT SERPL-MCNC: 1.18 MG/DL (ref 0.6–1.3)
CREAT UR-MCNC: 137 MG/DL
CREAT UR-MCNC: 137 MG/DL
EOSINOPHIL # BLD AUTO: 0.09 THOUSAND/ΜL (ref 0–0.61)
EOSINOPHIL NFR BLD AUTO: 2 % (ref 0–6)
ERYTHROCYTE [DISTWIDTH] IN BLOOD BY AUTOMATED COUNT: 13.2 % (ref 11.6–15.1)
EST. AVERAGE GLUCOSE BLD GHB EST-MCNC: 120 MG/DL
GFR SERPL CREATININE-BSD FRML MDRD: 48 ML/MIN/1.73SQ M
GLUCOSE P FAST SERPL-MCNC: 115 MG/DL (ref 65–99)
GLUCOSE UR STRIP-MCNC: NEGATIVE MG/DL
HBA1C MFR BLD: 5.8 %
HCT VFR BLD AUTO: 45.1 % (ref 34.8–46.1)
HGB BLD-MCNC: 14.3 G/DL (ref 11.5–15.4)
HGB UR QL STRIP.AUTO: NEGATIVE
IMM GRANULOCYTES # BLD AUTO: 0.01 THOUSAND/UL (ref 0–0.2)
IMM GRANULOCYTES NFR BLD AUTO: 0 % (ref 0–2)
KETONES UR STRIP-MCNC: NEGATIVE MG/DL
LDLC SERPL DIRECT ASSAY-MCNC: 109 MG/DL (ref 0–100)
LEUKOCYTE ESTERASE UR QL STRIP: NEGATIVE
LYMPHOCYTES # BLD AUTO: 0.89 THOUSANDS/ΜL (ref 0.6–4.47)
LYMPHOCYTES NFR BLD AUTO: 15 % (ref 14–44)
MCH RBC QN AUTO: 30.6 PG (ref 26.8–34.3)
MCHC RBC AUTO-ENTMCNC: 31.7 G/DL (ref 31.4–37.4)
MCV RBC AUTO: 97 FL (ref 82–98)
MICROALBUMIN UR-MCNC: 36.5 MG/L (ref 0–20)
MICROALBUMIN/CREAT 24H UR: 27 MG/G CREATININE (ref 0–30)
MONOCYTES # BLD AUTO: 0.53 THOUSAND/ΜL (ref 0.17–1.22)
MONOCYTES NFR BLD AUTO: 9 % (ref 4–12)
NEUTROPHILS # BLD AUTO: 4.3 THOUSANDS/ΜL (ref 1.85–7.62)
NEUTS SEG NFR BLD AUTO: 74 % (ref 43–75)
NITRITE UR QL STRIP: NEGATIVE
NON-SQ EPI CELLS URNS QL MICRO: ABNORMAL /HPF
NRBC BLD AUTO-RTO: 0 /100 WBCS
PH UR STRIP.AUTO: 6 [PH]
PLATELET # BLD AUTO: 290 THOUSANDS/UL (ref 149–390)
PMV BLD AUTO: 9.2 FL (ref 8.9–12.7)
POTASSIUM SERPL-SCNC: 3.6 MMOL/L (ref 3.5–5.3)
PROT SERPL-MCNC: 7.1 G/DL (ref 6.4–8.2)
PROT UR STRIP-MCNC: NEGATIVE MG/DL
PROT UR-MCNC: 46 MG/DL
PROT/CREAT UR: 0.34 MG/G{CREAT} (ref 0–0.1)
RBC # BLD AUTO: 4.67 MILLION/UL (ref 3.81–5.12)
RBC #/AREA URNS AUTO: ABNORMAL /HPF
SODIUM SERPL-SCNC: 143 MMOL/L (ref 136–145)
SP GR UR STRIP.AUTO: >=1.03 (ref 1–1.03)
URATE SERPL-MCNC: 4.6 MG/DL (ref 2–6.8)
UROBILINOGEN UR QL STRIP.AUTO: 0.2 E.U./DL
WBC # BLD AUTO: 5.84 THOUSAND/UL (ref 4.31–10.16)
WBC #/AREA URNS AUTO: ABNORMAL /HPF

## 2021-01-22 PROCEDURE — 80053 COMPREHEN METABOLIC PANEL: CPT

## 2021-01-22 PROCEDURE — 36415 COLL VENOUS BLD VENIPUNCTURE: CPT

## 2021-01-22 PROCEDURE — 81001 URINALYSIS AUTO W/SCOPE: CPT

## 2021-01-22 PROCEDURE — 83721 ASSAY OF BLOOD LIPOPROTEIN: CPT

## 2021-01-22 PROCEDURE — 84550 ASSAY OF BLOOD/URIC ACID: CPT

## 2021-01-22 PROCEDURE — 85025 COMPLETE CBC W/AUTO DIFF WBC: CPT

## 2021-01-22 PROCEDURE — 82570 ASSAY OF URINE CREATININE: CPT

## 2021-01-22 PROCEDURE — 82043 UR ALBUMIN QUANTITATIVE: CPT

## 2021-01-22 PROCEDURE — 84156 ASSAY OF PROTEIN URINE: CPT

## 2021-01-22 PROCEDURE — 83036 HEMOGLOBIN GLYCOSYLATED A1C: CPT

## 2021-01-29 ENCOUNTER — TELEPHONE (OUTPATIENT)
Dept: NEPHROLOGY | Facility: CLINIC | Age: 66
End: 2021-01-29

## 2021-01-29 ENCOUNTER — OFFICE VISIT (OUTPATIENT)
Dept: NEPHROLOGY | Facility: CLINIC | Age: 66
End: 2021-01-29
Payer: MEDICARE

## 2021-01-29 VITALS
BODY MASS INDEX: 31.53 KG/M2 | DIASTOLIC BLOOD PRESSURE: 74 MMHG | OXYGEN SATURATION: 97 % | HEART RATE: 75 BPM | WEIGHT: 167 LBS | HEIGHT: 61 IN | SYSTOLIC BLOOD PRESSURE: 130 MMHG

## 2021-01-29 DIAGNOSIS — N18.31 STAGE 3A CHRONIC KIDNEY DISEASE (HCC): Primary | ICD-10-CM

## 2021-01-29 DIAGNOSIS — M35.1 MIXED CONNECTIVE TISSUE DISEASE (HCC): ICD-10-CM

## 2021-01-29 DIAGNOSIS — R53.83 FATIGUE, UNSPECIFIED TYPE: ICD-10-CM

## 2021-01-29 DIAGNOSIS — R31.29 MICROSCOPIC HEMATURIA: ICD-10-CM

## 2021-01-29 DIAGNOSIS — E66.9 CLASS 1 OBESITY: ICD-10-CM

## 2021-01-29 DIAGNOSIS — R53.83 OTHER FATIGUE: ICD-10-CM

## 2021-01-29 DIAGNOSIS — F32.9 REACTIVE DEPRESSION: Primary | ICD-10-CM

## 2021-01-29 PROCEDURE — 99214 OFFICE O/P EST MOD 30 MIN: CPT | Performed by: INTERNAL MEDICINE

## 2021-01-29 RX ORDER — VENLAFAXINE HYDROCHLORIDE 37.5 MG/1
CAPSULE, EXTENDED RELEASE ORAL
Qty: 30 CAPSULE | Refills: 3 | Status: SHIPPED | OUTPATIENT
Start: 2021-01-29 | End: 2021-02-10 | Stop reason: SINTOL

## 2021-01-29 NOTE — TELEPHONE ENCOUNTER
Patient seen this morning  Requesting medication you  Suggested be sent into [facility]  She states her eye doctor said it was ok, that he would see her to keep an eye on her

## 2021-01-29 NOTE — PROGRESS NOTES
[address] [facility] [place], [place]    Name: Tavia Cha  YOB: 1955      Assessment/Plan:           Problem List Items Addressed This Visit        Genitourinary    Microscopic hematuria     Resolved and probably due to caliectasis         Stage 3 chronic kidney disease - Primary     Lab Results   Component Value Date    EGFR 48 01/22/2021    EGFR 68 12/01/2020    EGFR 60 10/06/2020    CREATININE 1 18 01/22/2021    CREATININE 0 89 12/01/2020    CREATININE 0 98 10/06/2020   She had a decline in function to 48 ml/min  May be due to prior inflammation and use of prednisone  Will recheck labs in 3 months  Due to change in GFR recheck a kidney and bladder ultrasound         Relevant Orders    US kidney and bladder    Comprehensive metabolic panel    Microalbumin / creatinine urine ratio    Protein / creatinine ratio, urine    Urinalysis with microscopic       Other    Mixed connective tissue disease ([facility] [address] )     Followed by Dr Patt Bowman           Class 1 obesity      Other Visit Diagnoses     Other fatigue        Relevant Orders    TSH + Free T4            Subjective:      Patient ID: Tavia Cha is a 76 y o  female  HPI  k Vinay presented July 2020 with microscopic hematuria and bilateral caliectasis  She has   CKD 3  She has a history of mixed connective tissue disease and takes Plaquenil  She has a history of LUCINA positive inflammatory arthropathy  She takes Plaquenil 200 mg twice a day  She had an onset of a diffuse pruritic rash on her neck in arms in September  She 2 took a Medrol Dosepak which held with the H but the rash persisted  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  Medrol help with the joint pain issues involving hands legs and feet  She said that the rash was helped  She feels she crashed after the second dose and had depression with it   She tried Weight management Program in [facility] andis watching her diet    The following portions of the patient's history were reviewed and updated as appropriate: allergies, current medications, past family history, past medical history, past social history, past surgical history and problem list     Review of Systems   Constitutional: Positive for chills and fatigue  Negative for fever  Feels cold all the time   HENT: Negative for hearing loss  Eyes: Positive for photophobia  Has glaucoma   Respiratory: Positive for shortness of breath  Negative for cough  Cardiovascular: Negative for chest pain, palpitations and leg swelling  Gastrointestinal: Positive for nausea  Negative for abdominal pain, constipation and diarrhea  Genitourinary: Positive for difficulty urinating  Negative for decreased urine volume, dysuria and hematuria  Drinks a lot of water   Musculoskeletal: Positive for arthralgias, joint swelling and myalgias  Neurological: Positive for dizziness  Negative for light-headedness  Psychiatric/Behavioral: Positive for dysphoric mood and sleep disturbance           Social History     Socioeconomic History    Marital status: /Civil Union     Spouse name: None    Number of children: None    Years of education: None    Highest education level: None   Occupational History    None   Social Needs    Financial resource strain: None    Food insecurity     Worry: None     Inability: None    Transportation needs     Medical: None     Non-medical: None   Tobacco Use    Smoking status: Never Smoker    Smokeless tobacco: Never Used   Substance and Sexual Activity    Alcohol use: Not Currently     Comment: rarely    Drug use: No    Sexual activity: None   Lifestyle    Physical activity     Days per week: None     Minutes per session: None    Stress: None   Relationships    Social connections     Talks on phone: None     Gets together: None     Attends Adventism service: None     Active member of club or organization: None     Attends meetings of clubs or organizations: None     Relationship status: None    Intimate partner violence     Fear of current or ex partner: None     Emotionally abused: None     Physically abused: None     Forced sexual activity: None   Other Topics Concern    None   Social History Narrative    None     Past Medical History:   Diagnosis Date    Breast cancer ([facility] [address] ) 03/01/2017    right     Cancer ([facility] [address] )     R breast     History of radiation therapy 08/01/2017    right breast     Past Surgical History:   Procedure Laterality Date    BREAST BIOPSY Left 1979    benign    BREAST CYST EXCISION Left 1976    benign    BREAST LUMPECTOMY Right 03/01/2017    BREAST SURGERY Bilateral     lumpectomy    CHOLECYSTECTOMY      COLONOSCOPY      ESOPHAGOGASTRODUODENOSCOPY      [place] COLONOSCOPY FLX DX W/COLLJ SPEC WHEN PFRMD N/A 10/23/2017    Procedure: COLONOSCOPY;  Surgeon: Tamika Valentine MD;  Location: [facility];  Service: Colorectal       Current Outpatient Medications:     alendronate (FOSAMAX) 70 mg tablet, Take 70 mg by mouth Once a week, Disp: , Rfl:     azelastine (ASTELIN) 0 1 % nasal spray, 1 spray into each nostril daily Use in each nostril as directed, Disp: , Rfl:     Calcium Ascorbate 500 MG TABS, Take 500 mg by mouth 2 (two) times a day , Disp: , Rfl:     cholestyramine (QUESTRAN) 4 g packet, Take 2 packets (8 g total) by mouth 2 (two) times a day with meals, Disp: 180 packet, Rfl: 4    co-enzyme Q-10 30 MG capsule, Take 30 mg by mouth 3 (three) times a day, Disp: , Rfl:     Ergocalciferol (VITAMIN D2 PO), Take by mouth once a week, Disp: , Rfl:     famotidine (PEPCID) 20 mg tablet, Take 1 tablet (20 mg total) by mouth 2 (two) times a day, Disp: 60 tablet, Rfl: 0    fluticasone (FLONASE) 50 mcg/act nasal spray, 2 sprays into each nostril daily, Disp: , Rfl:     hydroxychloroquine (PLAQUENIL) 200 mg tablet, 200 mg 2 (two) times a day with meals , Disp: , Rfl: 0    loratadine (CLARITIN) 10 mg tablet, Take 1 tablet (10 mg total) by mouth daily (Patient taking differently: Take 10 mg by mouth as needed ), Disp: 30 tablet, Rfl: 0    Multiple Vitamin (MULTI-VITAMIN DAILY PO), Take 500 mg by mouth daily , Disp: , Rfl:     timolol (TIMOPTIC) 0 5 % ophthalmic solution, Administer 1 drop to both eyes every 12 (twelve) hours, Disp: , Rfl:     Turmeric 500 MG CAPS, Take by mouth as needed , Disp: , Rfl:     Lab Results   Component Value Date     10/03/2015    SODIUM 143 01/22/2021    K 3 6 01/22/2021     01/22/2021    CO2 28 01/22/2021    ANIONGAP 10 10/03/2015    AGAP 9 01/22/2021    BUN 9 01/22/2021    CREATININE 1 18 01/22/2021    GLUC 154 (H) 05/19/2019    GLUF 115 (H) 01/22/2021    CALCIUM 8 9 01/22/2021    AST 19 01/22/2021    ALT 28 01/22/2021    ALKPHOS 54 01/22/2021    PROT 7 5 10/03/2015    TP 7 1 01/22/2021    BILITOT 1 14 (H) 10/03/2015    TBILI 1 00 01/22/2021    EGFR 48 01/22/2021     Lab Results   Component Value Date    WBC 5 84 01/22/2021    HGB 14 3 01/22/2021    HCT 45 1 01/22/2021    MCV 97 01/22/2021     01/22/2021     Lab Results   Component Value Date    CHOLESTEROL 186 12/01/2020    CHOLESTEROL 234 (H) 05/21/2020    CHOLESTEROL 261 (H) 12/11/2019     Lab Results   Component Value Date    HDL 76 12/01/2020    HDL 73 (H) 04/09/2019    HDL 69 (H) 11/10/2018     Lab Results   Component Value Date    LDLCALC 91 12/01/2020    LDLCALC 147 (H) 04/09/2019    LDLCALC 156 (H) 11/10/2018     Lab Results   Component Value Date    TRIG 96 12/01/2020    TRIG 178 (H) 04/09/2019    TRIG 111 11/10/2018     No results found for: [place], [place]  Lab Results   Component Value Date    [id number] 2 040 05/21/2020     Lab Results   Component Value Date    CALCIUM 8 9 01/22/2021     No results found for: SPEP, UPEP  No results found for: MICROALWILDER GOMES4HUR        Objective:      /74   Pulse 75   Ht 5' 1" (1 549 m)   Wt 75 8 kg (167 lb)   LMP  (LMP Unknown)   SpO2 97%   BMI 31 55 kg/m²          Physical Exam  Constitutional:       Appearance: She is obese  She is not toxic-appearing  HENT:      Head: Normocephalic and atraumatic  Right Ear: External ear normal       Left Ear: External ear normal    Eyes:      Extraocular Movements: Extraocular movements intact  Pupils: Pupils are equal, round, and reactive to light  Neck:      Musculoskeletal: Normal range of motion and neck supple  No neck rigidity  Vascular: No carotid bruit  Cardiovascular:      Rate and Rhythm: Normal rate  Pulmonary:      Effort: Pulmonary effort is normal  No respiratory distress  Breath sounds: Normal breath sounds  No wheezing or rales  Abdominal:      General: Bowel sounds are normal  There is no distension  Tenderness: There is no abdominal tenderness  There is no guarding  Musculoskeletal:         General: No swelling  Right lower leg: No edema  Left lower leg: No edema  Lymphadenopathy:      Cervical: No cervical adenopathy  Skin:     General: Skin is warm  Neurological:      General: No focal deficit present  Mental Status: She is alert and oriented to person, place, and time  Mental status is at baseline

## 2021-01-29 NOTE — ASSESSMENT & PLAN NOTE
Lab Results   Component Value Date    EGFR 48 01/22/2021    EGFR 68 12/01/2020    EGFR 60 10/06/2020    CREATININE 1 18 01/22/2021    CREATININE 0 89 12/01/2020    CREATININE 0 98 10/06/2020   She had a decline in function to 48 ml/min  May be due to prior inflammation and use of prednisone  Will recheck labs in 3 months  Due to change in GFR recheck a kidney and bladder ultrasound

## 2021-02-05 ENCOUNTER — HOSPITAL ENCOUNTER (OUTPATIENT)
Dept: ULTRASOUND IMAGING | Facility: HOSPITAL | Age: 66
Discharge: HOME | End: 2021-02-05
Attending: INTERNAL MEDICINE
Payer: MEDICARE

## 2021-02-05 DIAGNOSIS — N18.31 STAGE 3A CHRONIC KIDNEY DISEASE (HCC): ICD-10-CM

## 2021-02-05 PROCEDURE — 76770 US EXAM ABDO BACK WALL COMP: CPT

## 2021-02-09 ENCOUNTER — TELEPHONE (OUTPATIENT)
Dept: NEPHROLOGY | Facility: CLINIC | Age: 66
End: 2021-02-09

## 2021-02-09 NOTE — TELEPHONE ENCOUNTER
Pt is calling in for the results of her recent ultrasound  She also is having side effects from the venlafaxine (EFFEXOR-XR) 37 5 mg  She is getting diarrhea and muscle stiffness since starting  Is there anything else you could recommend in place of this?

## 2021-02-10 DIAGNOSIS — F32.9 REACTIVE DEPRESSION: Primary | ICD-10-CM

## 2021-02-10 RX ORDER — SERTRALINE HYDROCHLORIDE 25 MG/1
25 TABLET, FILM COATED ORAL DAILY
Qty: 30 TABLET | Refills: 2 | Status: SHIPPED | OUTPATIENT
Start: 2021-02-10 | End: 2021-05-03

## 2021-02-10 NOTE — TELEPHONE ENCOUNTER
Ultrasound showed scarring in the area where the kidney cells are  Shows chronic kidney disease  Has she ever been on zoloft?   Stop venlafaxine now

## 2021-02-10 NOTE — TELEPHONE ENCOUNTER
Called and spoke to patient, aware of results  She has never been on Zoloft  She will stop venlafaxine now

## 2021-02-11 ENCOUNTER — HOSPITAL ENCOUNTER (OUTPATIENT)
Dept: MAMMOGRAPHY | Facility: HOSPITAL | Age: 66
Discharge: HOME | End: 2021-02-11
Payer: MEDICARE

## 2021-02-11 VITALS — HEIGHT: 61 IN | BODY MASS INDEX: 31.53 KG/M2 | WEIGHT: 167 LBS

## 2021-02-11 DIAGNOSIS — Z85.3 PERSONAL HISTORY OF BREAST CANCER: ICD-10-CM

## 2021-02-11 PROCEDURE — 77066 DX MAMMO INCL CAD BI: CPT

## 2021-02-11 PROCEDURE — G0279 TOMOSYNTHESIS, MAMMO: HCPCS

## 2021-03-01 ENCOUNTER — TRANSCRIBE ORDERS (OUTPATIENT)
Dept: ADMINISTRATIVE | Facility: HOSPITAL | Age: 66
End: 2021-03-01

## 2021-03-01 ENCOUNTER — LAB (OUTPATIENT)
Dept: LAB | Facility: HOSPITAL | Age: 66
End: 2021-03-01
Attending: INTERNAL MEDICINE
Payer: MEDICARE

## 2021-03-01 DIAGNOSIS — M32.9 SYSTEMIC LUPUS ERYTHEMATOSUS, UNSPECIFIED SLE TYPE, UNSPECIFIED ORGAN INVOLVEMENT STATUS (HCC): Primary | ICD-10-CM

## 2021-03-01 DIAGNOSIS — M32.9 SYSTEMIC LUPUS ERYTHEMATOSUS, UNSPECIFIED SLE TYPE, UNSPECIFIED ORGAN INVOLVEMENT STATUS (HCC): ICD-10-CM

## 2021-03-01 DIAGNOSIS — N18.31 STAGE 3A CHRONIC KIDNEY DISEASE (HCC): ICD-10-CM

## 2021-03-01 LAB
ALBUMIN SERPL BCP-MCNC: 3.4 G/DL (ref 3.5–5)
ALP SERPL-CCNC: 60 U/L (ref 46–116)
ALT SERPL W P-5'-P-CCNC: 35 U/L (ref 12–78)
ANION GAP SERPL CALCULATED.3IONS-SCNC: 7 MMOL/L (ref 4–13)
AST SERPL W P-5'-P-CCNC: 22 U/L (ref 5–45)
BACTERIA UR QL AUTO: NORMAL /HPF
BASOPHILS # BLD AUTO: 0.04 THOUSANDS/ΜL (ref 0–0.1)
BASOPHILS NFR BLD AUTO: 1 % (ref 0–1)
BILIRUB SERPL-MCNC: 0.9 MG/DL (ref 0.2–1)
BILIRUB UR QL STRIP: NEGATIVE
BUN SERPL-MCNC: 11 MG/DL (ref 5–25)
C3 SERPL-MCNC: 141 MG/DL (ref 90–180)
C4 SERPL-MCNC: 48 MG/DL (ref 10–40)
CALCIUM ALBUM COR SERPL-MCNC: 9.2 MG/DL (ref 8.3–10.1)
CALCIUM SERPL-MCNC: 8.7 MG/DL (ref 8.3–10.1)
CHLORIDE SERPL-SCNC: 106 MMOL/L (ref 100–108)
CLARITY UR: CLEAR
CO2 SERPL-SCNC: 29 MMOL/L (ref 21–32)
COLOR UR: YELLOW
CREAT SERPL-MCNC: 1.08 MG/DL (ref 0.6–1.3)
CRP SERPL QL: 3.1 MG/L
EOSINOPHIL # BLD AUTO: 0.07 THOUSAND/ΜL (ref 0–0.61)
EOSINOPHIL NFR BLD AUTO: 1 % (ref 0–6)
ERYTHROCYTE [DISTWIDTH] IN BLOOD BY AUTOMATED COUNT: 13.5 % (ref 11.6–15.1)
ERYTHROCYTE [SEDIMENTATION RATE] IN BLOOD: 36 MM/HOUR (ref 0–29)
GFR SERPL CREATININE-BSD FRML MDRD: 54 ML/MIN/1.73SQ M
GLUCOSE P FAST SERPL-MCNC: 143 MG/DL (ref 65–99)
GLUCOSE UR STRIP-MCNC: NEGATIVE MG/DL
HCT VFR BLD AUTO: 46.4 % (ref 34.8–46.1)
HCV AB SER QL: NORMAL
HGB BLD-MCNC: 14.7 G/DL (ref 11.5–15.4)
HGB UR QL STRIP.AUTO: ABNORMAL
IMM GRANULOCYTES # BLD AUTO: 0.01 THOUSAND/UL (ref 0–0.2)
IMM GRANULOCYTES NFR BLD AUTO: 0 % (ref 0–2)
KETONES UR STRIP-MCNC: NEGATIVE MG/DL
LEUKOCYTE ESTERASE UR QL STRIP: NEGATIVE
LYMPHOCYTES # BLD AUTO: 1 THOUSANDS/ΜL (ref 0.6–4.47)
LYMPHOCYTES NFR BLD AUTO: 17 % (ref 14–44)
MCH RBC QN AUTO: 30.5 PG (ref 26.8–34.3)
MCHC RBC AUTO-ENTMCNC: 31.7 G/DL (ref 31.4–37.4)
MCV RBC AUTO: 96 FL (ref 82–98)
MONOCYTES # BLD AUTO: 0.38 THOUSAND/ΜL (ref 0.17–1.22)
MONOCYTES NFR BLD AUTO: 7 % (ref 4–12)
NEUTROPHILS # BLD AUTO: 4.3 THOUSANDS/ΜL (ref 1.85–7.62)
NEUTS SEG NFR BLD AUTO: 74 % (ref 43–75)
NITRITE UR QL STRIP: NEGATIVE
NON-SQ EPI CELLS URNS QL MICRO: NORMAL /HPF
NRBC BLD AUTO-RTO: 0 /100 WBCS
PH UR STRIP.AUTO: 6 [PH]
PLATELET # BLD AUTO: 312 THOUSANDS/UL (ref 149–390)
PMV BLD AUTO: 9.4 FL (ref 8.9–12.7)
POTASSIUM SERPL-SCNC: 3.8 MMOL/L (ref 3.5–5.3)
PROT SERPL-MCNC: 7.2 G/DL (ref 6.4–8.2)
PROT UR STRIP-MCNC: NEGATIVE MG/DL
RBC # BLD AUTO: 4.82 MILLION/UL (ref 3.81–5.12)
RBC #/AREA URNS AUTO: NORMAL /HPF
SODIUM SERPL-SCNC: 142 MMOL/L (ref 136–145)
SP GR UR STRIP.AUTO: 1.01 (ref 1–1.03)
T4 FREE SERPL-MCNC: 1.12 NG/DL (ref 0.76–1.46)
TSH SERPL DL<=0.05 MIU/L-ACNC: 1.72 UIU/ML (ref 0.36–3.74)
UROBILINOGEN UR QL STRIP.AUTO: 0.2 E.U./DL
WBC # BLD AUTO: 5.8 THOUSAND/UL (ref 4.31–10.16)
WBC #/AREA URNS AUTO: NORMAL /HPF

## 2021-03-01 PROCEDURE — 85652 RBC SED RATE AUTOMATED: CPT

## 2021-03-01 PROCEDURE — 82570 ASSAY OF URINE CREATININE: CPT

## 2021-03-01 PROCEDURE — 86140 C-REACTIVE PROTEIN: CPT

## 2021-03-01 PROCEDURE — 84439 ASSAY OF FREE THYROXINE: CPT

## 2021-03-01 PROCEDURE — 82043 UR ALBUMIN QUANTITATIVE: CPT

## 2021-03-01 PROCEDURE — 84156 ASSAY OF PROTEIN URINE: CPT

## 2021-03-01 PROCEDURE — 84443 ASSAY THYROID STIM HORMONE: CPT

## 2021-03-01 PROCEDURE — 80053 COMPREHEN METABOLIC PANEL: CPT

## 2021-03-01 PROCEDURE — 86803 HEPATITIS C AB TEST: CPT

## 2021-03-01 PROCEDURE — 86707 HEPATITIS BE ANTIBODY: CPT

## 2021-03-01 PROCEDURE — 81001 URINALYSIS AUTO W/SCOPE: CPT

## 2021-03-01 PROCEDURE — 36415 COLL VENOUS BLD VENIPUNCTURE: CPT

## 2021-03-01 PROCEDURE — 86160 COMPLEMENT ANTIGEN: CPT

## 2021-03-01 PROCEDURE — 85025 COMPLETE CBC W/AUTO DIFF WBC: CPT

## 2021-03-02 LAB — HBV E AB SERPL QL IA: NEGATIVE

## 2021-03-03 LAB
CREAT UR-MCNC: 50.7 MG/DL
CREAT UR-MCNC: 50.7 MG/DL
MICROALBUMIN UR-MCNC: 16 MG/L (ref 0–20)
MICROALBUMIN/CREAT 24H UR: 32 MG/G CREATININE (ref 0–30)
PROT UR-MCNC: 14 MG/DL
PROT/CREAT UR: 0.28 MG/G{CREAT} (ref 0–0.1)

## 2021-03-10 DIAGNOSIS — Z23 ENCOUNTER FOR IMMUNIZATION: ICD-10-CM

## 2021-03-24 ENCOUNTER — IMMUNIZATIONS (OUTPATIENT)
Dept: FAMILY MEDICINE CLINIC | Facility: HOSPITAL | Age: 66
End: 2021-03-24

## 2021-03-24 DIAGNOSIS — Z23 ENCOUNTER FOR IMMUNIZATION: Primary | ICD-10-CM

## 2021-03-24 PROCEDURE — 0011A SARS-COV-2 / COVID-19 MRNA VACCINE (MODERNA) 100 MCG: CPT

## 2021-03-24 PROCEDURE — 91301 SARS-COV-2 / COVID-19 MRNA VACCINE (MODERNA) 100 MCG: CPT

## 2021-04-21 ENCOUNTER — IMMUNIZATIONS (OUTPATIENT)
Dept: FAMILY MEDICINE CLINIC | Facility: HOSPITAL | Age: 66
End: 2021-04-21

## 2021-04-21 DIAGNOSIS — Z23 ENCOUNTER FOR IMMUNIZATION: Primary | ICD-10-CM

## 2021-04-21 PROCEDURE — 91301 SARS-COV-2 / COVID-19 MRNA VACCINE (MODERNA) 100 MCG: CPT

## 2021-04-21 PROCEDURE — 0012A SARS-COV-2 / COVID-19 MRNA VACCINE (MODERNA) 100 MCG: CPT

## 2021-05-03 DIAGNOSIS — F32.9 REACTIVE DEPRESSION: ICD-10-CM

## 2021-05-03 RX ORDER — SERTRALINE HYDROCHLORIDE 25 MG/1
TABLET, FILM COATED ORAL
Qty: 30 TABLET | Refills: 2 | Status: SHIPPED | OUTPATIENT
Start: 2021-05-03 | End: 2021-05-05

## 2021-05-05 ENCOUNTER — OFFICE VISIT (OUTPATIENT)
Dept: NEPHROLOGY | Facility: CLINIC | Age: 66
End: 2021-05-05
Payer: MEDICARE

## 2021-05-05 VITALS
WEIGHT: 164 LBS | HEART RATE: 78 BPM | OXYGEN SATURATION: 98 % | DIASTOLIC BLOOD PRESSURE: 76 MMHG | HEIGHT: 61 IN | BODY MASS INDEX: 30.96 KG/M2 | SYSTOLIC BLOOD PRESSURE: 134 MMHG

## 2021-05-05 DIAGNOSIS — M35.1 MIXED CONNECTIVE TISSUE DISEASE (HCC): ICD-10-CM

## 2021-05-05 DIAGNOSIS — E78.5 DYSLIPIDEMIA: ICD-10-CM

## 2021-05-05 DIAGNOSIS — N18.31 STAGE 3A CHRONIC KIDNEY DISEASE (HCC): ICD-10-CM

## 2021-05-05 DIAGNOSIS — R31.29 MICROSCOPIC HEMATURIA: Primary | ICD-10-CM

## 2021-05-05 PROCEDURE — 99214 OFFICE O/P EST MOD 30 MIN: CPT | Performed by: INTERNAL MEDICINE

## 2021-05-05 RX ORDER — SERTRALINE HYDROCHLORIDE 25 MG/1
1 TABLET, FILM COATED ORAL DAILY
COMMUNITY
Start: 2021-03-07 | End: 2021-05-05 | Stop reason: ALTCHOICE

## 2021-05-05 RX ORDER — GABAPENTIN 100 MG/1
100 CAPSULE ORAL
COMMUNITY
Start: 2021-03-08 | End: 2021-06-04 | Stop reason: ALTCHOICE

## 2021-05-05 RX ORDER — DULOXETIN HYDROCHLORIDE 30 MG/1
30 CAPSULE, DELAYED RELEASE ORAL DAILY
Qty: 90 CAPSULE | Refills: 2 | Status: SHIPPED | OUTPATIENT
Start: 2021-05-05 | End: 2021-09-14

## 2021-05-05 NOTE — ASSESSMENT & PLAN NOTE
Lab Results   Component Value Date    EGFR 54 03/01/2021    EGFR 48 01/22/2021    EGFR 68 12/01/2020    CREATININE 1 08 03/01/2021    CREATININE 1 18 01/22/2021    CREATININE 0 89 12/01/2020   She has mild stage 3A CKD with mild cortical thinning (explained using an anatomic diagram)    Would not consider a biopsy or further evaluation unless she starts having proteinuria  She has minimal microalbuniuria at 32 mg/g (<30) and protein to creatinine ratio is very slightly elevated

## 2021-05-05 NOTE — ASSESSMENT & PLAN NOTE
Has been intermittent and may be related to mild caliectasis which I demonstrated on an anatomic diagram

## 2021-05-05 NOTE — ASSESSMENT & PLAN NOTE
She is taking Plaquenil and very low dose gabapentin  Would recommend switching sertraline to Cymbalata 30mg daily  It may help with her muscle pain  She also may have increased myalgia due to Fosamax

## 2021-05-05 NOTE — PROGRESS NOTES
[address] [facility], [place]    Name: More Rojas  YOB: 1955  Received both Covid vaccines    Assessment/Plan:           Problem List Items Addressed This Visit        Genitourinary    Microscopic hematuria - Primary     Has been intermittent and may be related to mild caliectasis which I demonstrated on an anatomic diagram           Relevant Orders    LDL cholesterol, direct (Completed)    Microalbumin / creatinine urine ratio (Completed)    Protein / creatinine ratio, urine (Completed)    Uric acid (Completed)    Urinalysis with microscopic (Completed)    Stage 3 chronic kidney disease ([address] )     Lab Results   Component Value Date    EGFR 54 03/01/2021    EGFR 48 01/22/2021    EGFR 68 12/01/2020    CREATININE 1 08 03/01/2021    CREATININE 1 18 01/22/2021    CREATININE 0 89 12/01/2020   She has mild stage 3A CKD with mild cortical thinning (explained using an anatomic diagram)  Would not consider a biopsy or further evaluation unless she starts having proteinuria  She has minimal microalbuniuria at 32 mg/g (<30) and protein to creatinine ratio is very slightly elevated         Relevant Orders    LDL cholesterol, direct (Completed)    Microalbumin / creatinine urine ratio (Completed)    Protein / creatinine ratio, urine (Completed)    Uric acid (Completed)    Urinalysis with microscopic (Completed)       Other    Mixed connective tissue disease ([address] )     She is taking Plaquenil and very low dose gabapentin  Would recommend switching sertraline to Cymbalata 30mg daily  It may help with her muscle pain  She also may have increased myalgia due to Fosamax         Relevant Medications    DULoxetine (CYMBALTA) 30 mg delayed release capsule      Other Visit Diagnoses     Dyslipidemia        Relevant Orders    LDL cholesterol, direct (Completed)            Subjective:      Patient ID: More Rojas is a 78 y o  female      HPI has a history of microscopic hematuria which had resolved and CKD 3  She has a history of mixed connective tissue disease  And as burning muscle pain  The following portions of the patient's history were reviewed and updated as appropriate: allergies, current medications, past family history, past medical history, past social history, past surgical history and problem list     Review of Systems   Constitutional: Positive for activity change and fatigue  Negative for appetite change  HENT: Positive for ear pain  Negative for hearing loss  Eustachian tube dysfunction   Eyes: Negative for visual disturbance  Respiratory: Positive for shortness of breath  Negative for cough and chest tightness  Cardiovascular: Positive for leg swelling  Negative for chest pain and palpitations  Dependent edema   Gastrointestinal: Positive for diarrhea  Discomfort with BM  Had a Schatski ring dilation last week   Genitourinary: Positive for decreased urine volume and difficulty urinating  Negative for hematuria and urgency  A little foamy  Occasional hesitancy   Musculoskeletal: Positive for arthralgias, back pain and myalgias  Neurological: Positive for dizziness and light-headedness  Feels like a zombie at times   Hematological: Does not bruise/bleed easily  Psychiatric/Behavioral: Positive for dysphoric mood           Social History     Socioeconomic History    Marital status: /Civil Union     Spouse name: None    Number of children: None    Years of education: None    Highest education level: None   Occupational History    None   Tobacco Use    Smoking status: Never Smoker    Smokeless tobacco: Never Used   Substance and Sexual Activity    Alcohol use: Not Currently     Comment: rarely    Drug use: No    Sexual activity: None   Other Topics Concern    None   Social History Narrative    None     Social Determinants of Health     Financial Resource Strain:     Difficulty of Paying Living Expenses: Food Insecurity:     Worried About Running Out of Food in the Last Year:     [address] in the Last Year:    Transportation Needs:     Lack of Transportation (Medical):      Lack of Transportation (Non-Medical):    Physical Activity:     Days of Exercise per Week:     Minutes of Exercise per Session:    Stress:     Feeling of Stress :    Social Connections:     Frequency of Communication with Friends and Family:     Frequency of Social Gatherings with Friends and Family:     Attends [employer] orth[employer] Services:     Active Member of Clubs or Organizations:     Attends Club or Organization Meetings:     Marital Status:    Intimate Partner Violence:     Fear of Current or Ex-Partner:     Emotionally Abused:     Physically Abused:     Sexually Abused:      Past Medical History:   Diagnosis Date    Breast cancer ([address] ) 03/01/2017    right     History of radiation therapy 08/01/2017    right breast     Past Surgical History:   Procedure Laterality Date    BREAST BIOPSY Left 1979    benign    BREAST LUMPECTOMY Right 03/01/2017    BREAST SURGERY      CHOLECYSTECTOMY      COLONOSCOPY      ESOPHAGOGASTRODUODENOSCOPY      NC COLONOSCOPY FLX DX W/COLLJ SPEC WHEN PFRMD N/A 10/23/2017    Procedure: COLONOSCOPY;  Surgeon: Naveen Campbell MD;  Location: [facility];  Service: Colorectal       Current Outpatient Medications:     alendronate (FOSAMAX) 70 mg tablet, Take 70 mg by mouth Once a week, Disp: , Rfl:     azelastine (ASTELIN) 0 1 % nasal spray, 1 spray into each nostril daily Use in each nostril as directed, Disp: , Rfl:     Calcium Ascorbate 500 MG TABS, Take 500 mg by mouth 2 (two) times a day , Disp: , Rfl:     cholestyramine (QUESTRAN) 4 g packet, Take 2 packets (8 g total) by mouth 2 (two) times a day with meals, Disp: 180 packet, Rfl: 4    co-enzyme Q-10 30 MG capsule, Take 30 mg by mouth daily  (Patient not taking: Reported on 9/8/2021), Disp: , Rfl:     fluticasone (FLONASE) 50 mcg/act nasal spray, 2 sprays into each nostril daily, Disp: , Rfl:     hydroxychloroquine (PLAQUENIL) 200 mg tablet, 200 mg 2 (two) times a day with meals , Disp: , Rfl: 0    loratadine (CLARITIN) 10 mg tablet, Take 1 tablet (10 mg total) by mouth daily (Patient taking differently: Take 10 mg by mouth as needed ), Disp: 30 tablet, Rfl: 0    Multiple Vitamin (MULTI-VITAMIN DAILY PO), Take 500 mg by mouth daily , Disp: , Rfl:     timolol (TIMOPTIC) 0 5 % ophthalmic solution, Administer 1 drop to both eyes every 12 (twelve) hours, Disp: , Rfl:     DULoxetine (CYMBALTA) 30 mg delayed release capsule, Take 1 capsule (30 mg total) by mouth daily, Disp: 90 capsule, Rfl: 2    DULoxetine (CYMBALTA) 60 mg delayed release capsule, Take 1 capsule (60 mg total) by mouth daily, Disp: 90 capsule, Rfl: 3    fluocinonide (LIDEX) 0 05 % external solution, Apply topically 2 (two) times a day, Disp: , Rfl:     Lab Results   Component Value Date     10/03/2015    SODIUM 141 09/02/2021    K 3 8 09/02/2021     09/02/2021    CO2 28 09/02/2021    ANIONGAP 10 10/03/2015    AGAP 5 09/02/2021    BUN 15 09/02/2021    CREATININE 1 11 09/02/2021    GLUC 154 (H) 05/19/2019    GLUF 120 (H) 09/02/2021    CALCIUM 8 5 09/02/2021    AST 18 09/02/2021    ALT 29 09/02/2021    ALKPHOS 64 09/02/2021    PROT 7 5 10/03/2015    TP 7 2 09/02/2021    BILITOT 1 14 (H) 10/03/2015    TBILI 0 80 09/02/2021    EGFR 52 09/02/2021     Lab Results   Component Value Date    WBC 5 00 09/02/2021    HGB 13 1 09/02/2021    HCT 40 9 09/02/2021    MCV 94 09/02/2021     09/02/2021     Lab Results   Component Value Date    CHOLESTEROL 186 12/01/2020    CHOLESTEROL 234 (H) 05/21/2020    CHOLESTEROL 261 (H) 12/11/2019     Lab Results   Component Value Date    HDL 76 12/01/2020    HDL 73 (H) 04/09/2019    HDL 69 (H) 11/10/2018     Lab Results   Component Value Date    LDLCALC 91 12/01/2020    LDLCALC 147 (H) 04/09/2019    LDLCALC 156 (H) 11/10/2018     Lab Results Component Value Date    TRIG 96 12/01/2020    TRIG 178 (H) 04/09/2019    TRIG 111 11/10/2018     No results found for: [facility] [place]  Lab Results   Component Value Date    XWH4SGUPDISQ 1 720 03/01/2021     Lab Results   Component Value Date    CALCIUM 8 5 09/02/2021     No results found for: SPEP, UPEP  No results found for: MICROALBUR, [id number]        Objective:      /76   Pulse 78   Ht 5' 1" (1 549 m)   Wt 74 4 kg (164 lb)   LMP  (LMP Unknown)   SpO2 98%   BMI 30 99 kg/m²          Physical Exam  Constitutional:       General: She is not in acute distress  Appearance: She is not ill-appearing, toxic-appearing or diaphoretic  HENT:      Head: Normocephalic and atraumatic  Right Ear: External ear normal       Left Ear: External ear normal    Eyes:      Extraocular Movements: Extraocular movements intact  Pupils: Pupils are equal, round, and reactive to light  Cardiovascular:      Rate and Rhythm: Normal rate and regular rhythm  Heart sounds: No murmur  Pulmonary:      Effort: Pulmonary effort is normal  No respiratory distress  Breath sounds: Normal breath sounds  No wheezing or rales  Abdominal:      General: Bowel sounds are normal  There is no distension  Palpations: Abdomen is soft  Tenderness: There is no abdominal tenderness  There is no guarding  Musculoskeletal: Normal range of motion  General: Tenderness present  No swelling  Right lower leg: No edema  Left lower leg: No edema  Skin:     General: Skin is warm and dry  Neurological:      General: No focal deficit present  Mental Status: She is alert and oriented to person, place, and time  Mental status is at baseline  Psychiatric:         Mood and Affect: Mood normal          Behavior: Behavior normal          Thought Content:  Thought content normal          Judgment: Judgment normal

## 2021-06-04 ENCOUNTER — OFFICE VISIT (OUTPATIENT)
Dept: FAMILY MEDICINE CLINIC | Facility: CLINIC | Age: 66
End: 2021-06-04
Payer: MEDICARE

## 2021-06-04 VITALS
RESPIRATION RATE: 20 BRPM | HEIGHT: 61 IN | TEMPERATURE: 97.7 F | WEIGHT: 165 LBS | HEART RATE: 84 BPM | SYSTOLIC BLOOD PRESSURE: 132 MMHG | BODY MASS INDEX: 31.15 KG/M2 | DIASTOLIC BLOOD PRESSURE: 74 MMHG

## 2021-06-04 DIAGNOSIS — L30.9 DERMATITIS: ICD-10-CM

## 2021-06-04 DIAGNOSIS — E66.09 CLASS 1 OBESITY DUE TO EXCESS CALORIES WITH SERIOUS COMORBIDITY AND BODY MASS INDEX (BMI) OF 33.0 TO 33.9 IN ADULT: ICD-10-CM

## 2021-06-04 DIAGNOSIS — C50.911 BILATERAL MALIGNANT NEOPLASM OF BREAST IN FEMALE, UNSPECIFIED ESTROGEN RECEPTOR STATUS, UNSPECIFIED SITE OF BREAST (HCC): ICD-10-CM

## 2021-06-04 DIAGNOSIS — M32.9 SYSTEMIC LUPUS ERYTHEMATOSUS, UNSPECIFIED SLE TYPE, UNSPECIFIED ORGAN INVOLVEMENT STATUS (HCC): Primary | ICD-10-CM

## 2021-06-04 DIAGNOSIS — N18.31 STAGE 3A CHRONIC KIDNEY DISEASE (HCC): ICD-10-CM

## 2021-06-04 DIAGNOSIS — E78.2 MIXED HYPERLIPIDEMIA: ICD-10-CM

## 2021-06-04 DIAGNOSIS — C50.912 BILATERAL MALIGNANT NEOPLASM OF BREAST IN FEMALE, UNSPECIFIED ESTROGEN RECEPTOR STATUS, UNSPECIFIED SITE OF BREAST (HCC): ICD-10-CM

## 2021-06-04 DIAGNOSIS — E55.9 VITAMIN D DEFICIENCY: ICD-10-CM

## 2021-06-04 DIAGNOSIS — G62.9 NEUROPATHY: ICD-10-CM

## 2021-06-04 PROCEDURE — 99214 OFFICE O/P EST MOD 30 MIN: CPT | Performed by: FAMILY MEDICINE

## 2021-06-04 RX ORDER — DULOXETIN HYDROCHLORIDE 60 MG/1
60 CAPSULE, DELAYED RELEASE ORAL DAILY
Qty: 90 CAPSULE | Refills: 3 | Status: SHIPPED | OUTPATIENT
Start: 2021-06-04 | End: 2021-09-14

## 2021-06-04 NOTE — PROGRESS NOTES
Assessment/Plan:  Systemic lupus erythematosus in flare-up fluid by Dermatology and Rheumatology on Plaquenil and topical betamethasone  History of breast CA currently in remission    History of esophageal sphincter stricture post dilatation by Gastroenterology  Osteoporosis on Fosamax    Morbid obesity diet discussed    Chronic kidney disease stage 3 followed by Nephrology    Neuropathy the patient has discontinued Neurontin because it was ineffective was started on Cymbalta at 30 mg she states that 30 mg is ineffective I suggested that she increase to 60 mg hopefully that will have a therapeutic effect     hyperlipidemia on Questran    Problem List Items Addressed This Visit     None           There are no diagnoses linked to this encounter  No problem-specific Assessment & Plan notes found for this encounter  PHQ-9 Depression Screening    PHQ-9:   Frequency of the following problems over the past two weeks:      Little interest or pleasure in doing things: 0 - not at all  Feeling down, depressed, or hopeless: 0 - not at all  PHQ-2 Score: 0          Body mass index is 31 18 kg/m²  BMI Counseling: Body mass index is 31 18 kg/m²  The BMI is above normal  Nutrition recommendations include reducing portion sizes, decreasing overall calorie intake, 3-5 servings of fruits/vegetables daily, reducing fast food intake, consuming healthier snacks, decreasing soda and/or juice intake, moderation in carbohydrate intake, increasing intake of lean protein, reducing intake of saturated fat and trans fat and reducing intake of cholesterol  Subjective:      Patient ID: Dieter Fernández is a 76 y o  female  Patient presents for six-month follow-up      The following portions of the patient's history were reviewed and updated as appropriate:   She has a past medical history of Breast cancer ([facility] [address] ) (03/01/2017) and History of radiation therapy (08/01/2017)  ,  does not have any pertinent problems on file  ,   has a past surgical history that includes Cholecystectomy; Colonoscopy; Esophagogastroduodenoscopy; pr colonoscopy flx dx w/collj spec when pfrmd (N/A, 10/23/2017); Breast surgery; Breast biopsy (Left, 1979); and Breast lumpectomy (Right, 03/01/2017)  ,  family history includes BRCA1 Negative in her sister; BRCA2 Negative in her sister; Breast cancer (age of onset: 48) in her cousin; Breast cancer (age of onset: 64) in her sister; Breast cancer (age of onset: 76) in her maternal grandmother; Uterine cancer in her sister  ,   reports that she has never smoked  She has never used smokeless tobacco  She reports previous alcohol use  She reports that she does not use drugs  ,  is allergic to bactrim [sulfamethoxazole-trimethoprim]; other; molds & smuts; pollen extract; and wound dressings     Current Outpatient Medications   Medication Sig Dispense Refill    alendronate (FOSAMAX) 70 mg tablet Take 70 mg by mouth Once a week      azelastine (ASTELIN) 0 1 % nasal spray 1 spray into each nostril daily Use in each nostril as directed      Calcium Ascorbate 500 MG TABS Take 500 mg by mouth 2 (two) times a day       cholestyramine (QUESTRAN) 4 g packet Take 2 packets (8 g total) by mouth 2 (two) times a day with meals 180 packet 4    co-enzyme Q-10 30 MG capsule Take 30 mg by mouth daily       DULoxetine (CYMBALTA) 30 mg delayed release capsule Take 1 capsule (30 mg total) by mouth daily 90 capsule 2    fluticasone (FLONASE) 50 mcg/act nasal spray 2 sprays into each nostril daily      hydroxychloroquine (PLAQUENIL) 200 mg tablet 200 mg 2 (two) times a day with meals   0    loratadine (CLARITIN) 10 mg tablet Take 1 tablet (10 mg total) by mouth daily (Patient taking differently: Take 10 mg by mouth as needed ) 30 tablet 0    Multiple Vitamin (MULTI-VITAMIN DAILY PO) Take 500 mg by mouth daily       timolol (TIMOPTIC) 0 5 % ophthalmic solution Administer 1 drop to both eyes every 12 (twelve) hours       No current facility-administered medications for this visit  Review of Systems   Constitutional: Positive for fatigue  Negative for chills and fever  HENT: Negative for ear pain and sore throat  Eyes: Negative for pain and visual disturbance  Respiratory: Negative for cough and shortness of breath  Cardiovascular: Negative for chest pain and palpitations  Gastrointestinal: Negative for abdominal pain and vomiting  Genitourinary: Negative for dysuria and hematuria  Musculoskeletal: Positive for arthralgias and myalgias  Negative for back pain  Skin: Positive for rash  Negative for color change  Neurological: Negative for seizures and syncope  All other systems reviewed and are negative  Objective:    /74   Pulse 84   Temp 97 7 °F (36 5 °C)   Resp 20   Ht 5' 1" (1 549 m)   Wt 74 8 kg (165 lb)   LMP  (LMP Unknown)   BMI 31 18 kg/m²   Body mass index is 31 18 kg/m²  Physical Exam  Constitutional:       Appearance: She is well-developed  She is obese  HENT:      Head: Normocephalic  Eyes:      Pupils: Pupils are equal, round, and reactive to light  Neck:      Musculoskeletal: Normal range of motion  Cardiovascular:      Rate and Rhythm: Normal rate and regular rhythm  Heart sounds: Normal heart sounds  Pulmonary:      Effort: Pulmonary effort is normal       Breath sounds: Normal breath sounds  Abdominal:      General: Bowel sounds are normal       Palpations: Abdomen is soft  Tenderness: There is no abdominal tenderness  Skin:     General: Skin is warm  Findings: Rash present  Neurological:      Mental Status: She is alert and oriented to person, place, and time

## 2021-06-14 ENCOUNTER — TRANSCRIBE ORDERS (OUTPATIENT)
Dept: ADMINISTRATIVE | Facility: HOSPITAL | Age: 66
End: 2021-06-14

## 2021-06-14 DIAGNOSIS — Z78.0 POST-MENOPAUSAL: ICD-10-CM

## 2021-06-14 DIAGNOSIS — Z13.820 OSTEOPOROSIS SCREENING: Primary | ICD-10-CM

## 2021-07-13 ENCOUNTER — HOSPITAL ENCOUNTER (OUTPATIENT)
Dept: BONE DENSITY | Facility: HOSPITAL | Age: 66
Discharge: HOME | End: 2021-07-13
Payer: MEDICARE

## 2021-07-13 DIAGNOSIS — Z13.820 OSTEOPOROSIS SCREENING: ICD-10-CM

## 2021-07-13 DIAGNOSIS — Z78.0 POST-MENOPAUSAL: ICD-10-CM

## 2021-07-13 PROCEDURE — 77080 DXA BONE DENSITY AXIAL: CPT

## 2021-08-30 ENCOUNTER — TELEPHONE (OUTPATIENT)
Dept: ADMINISTRATIVE | Facility: OTHER | Age: 66
End: 2021-08-30

## 2021-08-30 NOTE — TELEPHONE ENCOUNTER
----- Message from Luiz Capone sent at 8/27/2021  2:16 PM EDT -----  Regarding: Care Gaps Request  08/27/21 2:16 PM    Hello, our patient Alan Jean has had CRC: Colonoscopy completed/performed  Please assist in updating the patient chart by making an External outreach to [facility] facility located in [place]  The date of service is 08/26/2021      Thank you,  Luiz Capone  [facility]

## 2021-08-30 NOTE — TELEPHONE ENCOUNTER
Upon review of the In Basket request and the patient's chart, initial outreach has been made via fax, please see Contacts section for details       Thank you  Andrew Jo

## 2021-08-30 NOTE — LETTER
Procedure Request Form: Colonoscopy      Date Requested: 21  Patient: Jaydon Lambing  Patient : 1955   Referring Provider: Ligia Johnson, DO        Date of Procedure ______________________________       The above patient has informed us that they have completed their   most recent Colonoscopy at your facility  Please complete   this form and attach all corresponding procedure reports/results  Comments __________________________________________________________  ____________Date of service /________________________________________________________  ____________________________________________________________________  ____________________________________________________________________    Facility Completing Procedure _________________________________________    Form Completed By (print name) _______________________________________      Signature __________________________________________________________      These reports are needed for  compliance    Please fax this completed form and a copy of the procedure report to our office located at [place] [address] as soon as possible to [phone] [facility]: Phone [phone]    We thank you for your assistance in treating our mutual patient

## 2021-09-02 ENCOUNTER — APPOINTMENT (OUTPATIENT)
Dept: LAB | Facility: HOSPITAL | Age: 66
End: 2021-09-02
Attending: INTERNAL MEDICINE
Payer: MEDICARE

## 2021-09-02 DIAGNOSIS — R31.29 MICROSCOPIC HEMATURIA: ICD-10-CM

## 2021-09-02 DIAGNOSIS — N18.31 STAGE 3A CHRONIC KIDNEY DISEASE (HCC): ICD-10-CM

## 2021-09-02 DIAGNOSIS — E78.5 DYSLIPIDEMIA: ICD-10-CM

## 2021-09-02 LAB
ALBUMIN SERPL BCP-MCNC: 3.3 G/DL (ref 3.5–5)
ALP SERPL-CCNC: 64 U/L (ref 46–116)
ALT SERPL W P-5'-P-CCNC: 29 U/L (ref 12–78)
ANION GAP SERPL CALCULATED.3IONS-SCNC: 5 MMOL/L (ref 4–13)
AST SERPL W P-5'-P-CCNC: 18 U/L (ref 5–45)
BACTERIA UR QL AUTO: ABNORMAL /HPF
BASOPHILS # BLD AUTO: 0.04 THOUSANDS/ΜL (ref 0–0.1)
BASOPHILS NFR BLD AUTO: 1 % (ref 0–1)
BILIRUB SERPL-MCNC: 0.8 MG/DL (ref 0.2–1)
BILIRUB UR QL STRIP: NEGATIVE
BUN SERPL-MCNC: 15 MG/DL (ref 5–25)
CALCIUM ALBUM COR SERPL-MCNC: 9.1 MG/DL (ref 8.3–10.1)
CALCIUM SERPL-MCNC: 8.5 MG/DL (ref 8.3–10.1)
CHLORIDE SERPL-SCNC: 108 MMOL/L (ref 100–108)
CLARITY UR: CLEAR
CO2 SERPL-SCNC: 28 MMOL/L (ref 21–32)
COLOR UR: YELLOW
CREAT SERPL-MCNC: 1.11 MG/DL (ref 0.6–1.3)
CREAT UR-MCNC: 105 MG/DL
CREAT UR-MCNC: 105 MG/DL
EOSINOPHIL # BLD AUTO: 0.14 THOUSAND/ΜL (ref 0–0.61)
EOSINOPHIL NFR BLD AUTO: 3 % (ref 0–6)
ERYTHROCYTE [DISTWIDTH] IN BLOOD BY AUTOMATED COUNT: 13.4 % (ref 11.6–15.1)
GFR SERPL CREATININE-BSD FRML MDRD: 52 ML/MIN/1.73SQ M
GLUCOSE P FAST SERPL-MCNC: 120 MG/DL (ref 65–99)
GLUCOSE UR STRIP-MCNC: NEGATIVE MG/DL
HCT VFR BLD AUTO: 40.9 % (ref 34.8–46.1)
HGB BLD-MCNC: 13.1 G/DL (ref 11.5–15.4)
HGB UR QL STRIP.AUTO: NEGATIVE
IMM GRANULOCYTES # BLD AUTO: 0.01 THOUSAND/UL (ref 0–0.2)
IMM GRANULOCYTES NFR BLD AUTO: 0 % (ref 0–2)
KETONES UR STRIP-MCNC: NEGATIVE MG/DL
LDLC SERPL DIRECT ASSAY-MCNC: 135 MG/DL (ref 0–100)
LEUKOCYTE ESTERASE UR QL STRIP: ABNORMAL
LYMPHOCYTES # BLD AUTO: 1.19 THOUSANDS/ΜL (ref 0.6–4.47)
LYMPHOCYTES NFR BLD AUTO: 24 % (ref 14–44)
MCH RBC QN AUTO: 30 PG (ref 26.8–34.3)
MCHC RBC AUTO-ENTMCNC: 32 G/DL (ref 31.4–37.4)
MCV RBC AUTO: 94 FL (ref 82–98)
MICROALBUMIN UR-MCNC: 7 MG/L (ref 0–20)
MICROALBUMIN/CREAT 24H UR: 7 MG/G CREATININE (ref 0–30)
MONOCYTES # BLD AUTO: 0.52 THOUSAND/ΜL (ref 0.17–1.22)
MONOCYTES NFR BLD AUTO: 10 % (ref 4–12)
NEUTROPHILS # BLD AUTO: 3.1 THOUSANDS/ΜL (ref 1.85–7.62)
NEUTS SEG NFR BLD AUTO: 62 % (ref 43–75)
NITRITE UR QL STRIP: NEGATIVE
NON-SQ EPI CELLS URNS QL MICRO: ABNORMAL /HPF
NRBC BLD AUTO-RTO: 0 /100 WBCS
PH UR STRIP.AUTO: 5.5 [PH]
PLATELET # BLD AUTO: 294 THOUSANDS/UL (ref 149–390)
PMV BLD AUTO: 8.7 FL (ref 8.9–12.7)
POTASSIUM SERPL-SCNC: 3.8 MMOL/L (ref 3.5–5.3)
PROT SERPL-MCNC: 7.2 G/DL (ref 6.4–8.2)
PROT UR STRIP-MCNC: NEGATIVE MG/DL
PROT UR-MCNC: 33 MG/DL
PROT/CREAT UR: 0.31 MG/G{CREAT} (ref 0–0.1)
RBC # BLD AUTO: 4.36 MILLION/UL (ref 3.81–5.12)
RBC #/AREA URNS AUTO: ABNORMAL /HPF
SODIUM SERPL-SCNC: 141 MMOL/L (ref 136–145)
SP GR UR STRIP.AUTO: >=1.03 (ref 1–1.03)
URATE SERPL-MCNC: 5 MG/DL (ref 2–6.8)
UROBILINOGEN UR QL STRIP.AUTO: 0.2 E.U./DL
WBC # BLD AUTO: 5 THOUSAND/UL (ref 4.31–10.16)
WBC #/AREA URNS AUTO: ABNORMAL /HPF

## 2021-09-02 PROCEDURE — 82043 UR ALBUMIN QUANTITATIVE: CPT

## 2021-09-02 PROCEDURE — 36415 COLL VENOUS BLD VENIPUNCTURE: CPT

## 2021-09-02 PROCEDURE — 83721 ASSAY OF BLOOD LIPOPROTEIN: CPT

## 2021-09-02 PROCEDURE — 80053 COMPREHEN METABOLIC PANEL: CPT

## 2021-09-02 PROCEDURE — 84550 ASSAY OF BLOOD/URIC ACID: CPT

## 2021-09-02 PROCEDURE — 81001 URINALYSIS AUTO W/SCOPE: CPT

## 2021-09-02 PROCEDURE — 82570 ASSAY OF URINE CREATININE: CPT

## 2021-09-02 PROCEDURE — 85025 COMPLETE CBC W/AUTO DIFF WBC: CPT

## 2021-09-02 PROCEDURE — 84156 ASSAY OF PROTEIN URINE: CPT

## 2021-09-08 ENCOUNTER — OFFICE VISIT (OUTPATIENT)
Dept: NEPHROLOGY | Facility: CLINIC | Age: 66
End: 2021-09-08
Payer: MEDICARE

## 2021-09-08 VITALS
HEIGHT: 61 IN | OXYGEN SATURATION: 97 % | BODY MASS INDEX: 32.32 KG/M2 | WEIGHT: 171.2 LBS | SYSTOLIC BLOOD PRESSURE: 124 MMHG | HEART RATE: 82 BPM | DIASTOLIC BLOOD PRESSURE: 64 MMHG

## 2021-09-08 DIAGNOSIS — N18.31 STAGE 3A CHRONIC KIDNEY DISEASE (HCC): ICD-10-CM

## 2021-09-08 DIAGNOSIS — R31.29 MICROSCOPIC HEMATURIA: ICD-10-CM

## 2021-09-08 DIAGNOSIS — R19.7 DIARRHEA, UNSPECIFIED TYPE: Primary | ICD-10-CM

## 2021-09-08 DIAGNOSIS — R27.0 ATAXIA: ICD-10-CM

## 2021-09-08 DIAGNOSIS — R19.4 CHANGE IN BOWEL HABITS: ICD-10-CM

## 2021-09-08 DIAGNOSIS — M35.1 MIXED CONNECTIVE TISSUE DISEASE (HCC): ICD-10-CM

## 2021-09-08 DIAGNOSIS — E78.5 HYPERLIPIDEMIA, UNSPECIFIED HYPERLIPIDEMIA TYPE: ICD-10-CM

## 2021-09-08 PROCEDURE — 99214 OFFICE O/P EST MOD 30 MIN: CPT | Performed by: INTERNAL MEDICINE

## 2021-09-08 RX ORDER — FLUOCINONIDE TOPICAL SOLUTION USP, 0.05% 0.5 MG/ML
SOLUTION TOPICAL 2 TIMES DAILY
COMMUNITY

## 2021-09-08 RX ORDER — MONTELUKAST SODIUM 4 MG/1
1 TABLET, CHEWABLE ORAL DAILY
Qty: 90 TABLET | Refills: 2
Start: 2021-09-08

## 2021-09-08 NOTE — ASSESSMENT & PLAN NOTE
Lab Results   Component Value Date    EGFR 52 09/02/2021    EGFR 54 03/01/2021    EGFR 48 01/22/2021    CREATININE 1 11 09/02/2021    CREATININE 1 08 03/01/2021    CREATININE 1 18 01/22/2021   Creatinine has been relatively stable as noted above  She has stage IIIA chronic kidney disease  Kidney ultrasound demonstrated borderline renal atrophy    She avoids NSAIDs  Continue to monitor

## 2021-09-08 NOTE — ASSESSMENT & PLAN NOTE
She has noted recent change in her balance  When she climbs stairs she feels if she would fall backwards  It is possible this is due to the increase in duloxetine to 60 mg  Due to the cost of the drug, I suggested that she take the drug every other night and see how she feels on the 2nd day when she is due for a dose  Review of her fluid intake reveals that she drinks minimal amounts of fluid  This is coupled with a decrease in urine output    The she may have orthostatic hypotension causing dizziness  I urged her to drink at a minimum of 40 oz of liquid a day  Consider a neurology consult if there is no change

## 2021-09-08 NOTE — PROGRESS NOTES
[facility] [place], [place]    Name: Stevan Fernandez  YOB: 1955      Assessment/Plan:           Problem List Items Addressed This Visit        Genitourinary    Microscopic hematuria     Last UA demonstrated  insignificant 1-2 RBCs per high-power field         Stage 3 chronic kidney disease [facility])     Lab Results   Component Value Date    EGFR 52 09/02/2021    EGFR 54 03/01/2021    EGFR 48 01/22/2021    CREATININE 1 11 09/02/2021    CREATININE 1 08 03/01/2021    CREATININE 1 18 01/22/2021   Creatinine has been relatively stable as noted above  She has stage IIIA chronic kidney disease  Kidney ultrasound demonstrated borderline renal atrophy  She avoids NSAIDs  Continue to monitor            Other    Change in bowel habits     Taking colestipol tabs 1 gram with supper         Mixed connective tissue disease (HCC)     Taking Plaquenil  Ataxia     She has noted recent change in her balance  When she climbs stairs she feels if she would fall backwards  It is possible this is due to the increase in duloxetine to 60 mg  Due to the cost of the drug, I suggested that she take the drug every other night and see how she feels on the 2nd day when she is due for a dose  Review of her fluid intake reveals that she drinks minimal amounts of fluid  This is coupled with a decrease in urine output  The she may have orthostatic hypotension causing dizziness  I urged her to drink at a minimum of 40 oz of liquid a day  Consider a neurology consult if there is no change           Other Visit Diagnoses     Diarrhea, unspecified type    -  Primary    Relevant Medications    colestipol (COLESTID) 1 g tablet            Subjective:      Patient ID: Stevan Fernandez is a 78 y o  female  HPI Has a history of CKD 3b, mixed connective tissue disease, microscopic hematuria  She fell twice and fell backwards by losing balance  She cannot exercise  She feels more weak   When she climbs to the top of the step,she feels that she can fall backwards    She did not tell Dr Brianna Boo about it  She had her Cymbalta dose increased to 60mg in June and thinks that the imbalance started after that  The following portions of the patient's history were reviewed and updated as appropriate: allergies, current medications, past family history, past medical history, past social history, past surgical history and problem list     Review of Systems   Constitutional: Positive for fatigue  HENT: Positive for hearing loss  Eyes: Negative for visual disturbance  Respiratory: Positive for shortness of breath  Negative for cough  Cardiovascular: Positive for palpitations  Negative for chest pain and leg swelling  Gastrointestinal: Positive for diarrhea  Negative for abdominal pain, blood in stool and constipation  Genitourinary: Positive for decreased urine volume  Negative for difficulty urinating  Musculoskeletal: Positive for back pain  Neurological: Positive for dizziness and light-headedness  Off balance and feels foot arthritis is contributing   Hematological: Does not bruise/bleed easily           Social History     Socioeconomic History    Marital status: /Civil Union     Spouse name: None    Number of children: None    Years of education: None    Highest education level: None   Occupational History    None   Tobacco Use    Smoking status: Never Smoker    Smokeless tobacco: Never Used   Substance and Sexual Activity    Alcohol use: Not Currently     Comment: rarely    Drug use: No    Sexual activity: None   Other Topics Concern    None   Social History Narrative    None     Social Determinants of Health     Financial Resource Strain:     Difficulty of Paying Living Expenses:    Food Insecurity:     Worried About Running Out of Food in the Last Year:     Ran Out of Food in the Last Year:    Transportation Needs:     Lack [facility] Transportation ([facility] ([facility]):    Physical Activity:     Days of Exercise per Week:     Minutes of Exercise per Session:    Stress:     Feeling of Stress :    Social Connections:     Frequency of Communication with Friends and Family:     Frequency of Social Gatherings with Friends and Family:     Attends Jewish Services:     Active Member of Clubs or Organizations:     Attends Club or Organization Meetings:     Marital Status:    Intimate Partner Violence:     Fear of Current or Ex-Partner:     Emotionally Abused:     Physically Abused:     Sexually Abused:      Past Medical History:   Diagnosis Date    Breast cancer ([facility] [address] ) 03/01/2017    right     History of radiation therapy 08/01/2017    right breast     Past Surgical History:   Procedure Laterality Date    BREAST BIOPSY Left 1979    benign    BREAST LUMPECTOMY Right 03/01/2017    BREAST SURGERY      CHOLECYSTECTOMY      COLONOSCOPY      ESOPHAGOGASTRODUODENOSCOPY      MA COLONOSCOPY FLX DX W/COLLJ SPEC WHEN PFRMD N/A 10/23/2017    Procedure: COLONOSCOPY;  Surgeon: Carmelo Ferreira MD;  Location: [facility];  Service: Colorectal       Current Outpatient Medications:     azelastine (ASTELIN) 0 1 % nasal spray, 1 spray into each nostril daily Use in each nostril as directed, Disp: , Rfl:     Calcium Ascorbate 500 MG TABS, Take 500 mg by mouth 2 (two) times a day , Disp: , Rfl:     DULoxetine (CYMBALTA) 60 mg delayed release capsule, Take 1 capsule (60 mg total) by mouth daily, Disp: 90 capsule, Rfl: 3    fluocinonide (LIDEX) 0 05 % external solution, Apply topically 2 (two) times a day, Disp: , Rfl:     fluticasone (FLONASE) 50 mcg/act nasal spray, 2 sprays into each nostril daily, Disp: , Rfl:     hydroxychloroquine (PLAQUENIL) 200 mg tablet, 200 mg 2 (two) times a day with meals , Disp: , Rfl: 0    loratadine (CLARITIN) 10 mg tablet, Take 1 tablet (10 mg total) by mouth daily (Patient taking differently: Take 10 mg by mouth as needed ), Disp: 30 tablet, Rfl: 0    Multiple Vitamin (MULTI-VITAMIN DAILY PO), Take 500 mg by mouth daily , Disp: , Rfl:     timolol (TIMOPTIC) 0 5 % ophthalmic solution, Administer 1 drop to both eyes every 12 (twelve) hours, Disp: , Rfl:     alendronate (FOSAMAX) 70 mg tablet, Take 70 mg by mouth Once a week, Disp: , Rfl:     co-enzyme Q-10 30 MG capsule, Take 30 mg by mouth daily  (Patient not taking: Reported on 9/8/2021), Disp: , Rfl:     colestipol (COLESTID) 1 g tablet, Take 1 tablet (1 g total) by mouth daily, Disp: 90 tablet, Rfl: 2    DULoxetine (CYMBALTA) 30 mg delayed release capsule, Take 1 capsule (30 mg total) by mouth daily, Disp: 90 capsule, Rfl: 2    Lab Results   Component Value Date     10/03/2015    SODIUM 141 09/02/2021    K 3 8 09/02/2021     09/02/2021    CO2 28 09/02/2021    ANIONGAP 10 10/03/2015    AGAP 5 09/02/2021    BUN 15 09/02/2021    CREATININE 1 11 09/02/2021    GLUC 154 (H) 05/19/2019    GLUF 120 (H) 09/02/2021    CALCIUM 8 5 09/02/2021    AST 18 09/02/2021    ALT 29 09/02/2021    ALKPHOS 64 09/02/2021    PROT 7 5 10/03/2015    TP 7 2 09/02/2021    BILITOT 1 14 (H) 10/03/2015    TBILI 0 80 09/02/2021    EGFR 52 09/02/2021     Lab Results   Component Value Date    WBC 5 00 09/02/2021    HGB 13 1 09/02/2021    HCT 40 9 09/02/2021    MCV 94 09/02/2021     09/02/2021     Lab Results   Component Value Date    CHOLESTEROL 186 12/01/2020    CHOLESTEROL 234 (H) 05/21/2020    CHOLESTEROL 261 (H) 12/11/2019     Lab Results   Component Value Date    HDL 76 12/01/2020    HDL 73 (H) 04/09/2019    HDL 69 (H) 11/10/2018     Lab Results   Component Value Date    LDLCALC 91 12/01/2020    LDLCALC 147 (H) 04/09/2019    LDLCALC 156 (H) 11/10/2018     Lab Results   Component Value Date    TRIG 96 12/01/2020    TRIG 178 (H) 04/09/2019    TRIG 111 11/10/2018     No results found for: [place], [place]  Lab Results   Component Value Date    SBG0AHMSUIHO 1 720 03/01/2021     Lab Results Component Value Date    CALCIUM 8 5 09/02/2021     No results found for: SPEP, UPEP  No results found for: MICROALBUR, [id number]        Objective:      /64   Pulse 82   Ht 5' 1" (1 549 m)   Wt 77 7 kg (171 lb 3 2 oz)   LMP  (LMP Unknown)   SpO2 97%   BMI 32 35 kg/m²          Physical Exam  Constitutional:       General: She is not in acute distress  Appearance: Normal appearance  She is not toxic-appearing  HENT:      Head: Normocephalic and atraumatic  Right Ear: External ear normal       Left Ear: External ear normal    Eyes:      Extraocular Movements: Extraocular movements intact  Conjunctiva/sclera: Conjunctivae normal       Pupils: Pupils are equal, round, and reactive to light  Neck:      Vascular: No carotid bruit  Cardiovascular:      Rate and Rhythm: Normal rate and regular rhythm  Heart sounds: No murmur heard  Pulmonary:      Effort: Pulmonary effort is normal  No respiratory distress  Breath sounds: Normal breath sounds  No wheezing or rales  Abdominal:      General: Bowel sounds are normal  There is no distension  Palpations: Abdomen is soft  Tenderness: There is no abdominal tenderness  There is no guarding  Musculoskeletal:         General: Normal range of motion  Cervical back: Normal range of motion  Right lower leg: No edema  Left lower leg: No edema  Lymphadenopathy:      Cervical: No cervical adenopathy  Skin:     General: Skin is warm and dry  Neurological:      General: No focal deficit present  Mental Status: She is alert and oriented to person, place, and time  Mental status is at baseline  Psychiatric:         Mood and Affect: Mood normal          Behavior: Behavior normal          Thought Content:  Thought content normal          Judgment: Judgment normal

## 2021-09-08 NOTE — PATIENT INSTRUCTIONS
Take Cymbalta 60mg every other night  See if your balance improves by the second day  Drink at least 40 OUNCES OF LIQUID A DAY

## 2021-09-14 ENCOUNTER — TELEPHONE (OUTPATIENT)
Dept: FAMILY MEDICINE CLINIC | Facility: CLINIC | Age: 66
End: 2021-09-14

## 2021-09-14 DIAGNOSIS — R29.6 MULTIPLE FALLS: Primary | ICD-10-CM

## 2021-09-14 DIAGNOSIS — R26.89 BALANCE PROBLEM: ICD-10-CM

## 2021-09-14 DIAGNOSIS — G62.9 NEUROPATHY: ICD-10-CM

## 2021-09-14 RX ORDER — DULOXETIN HYDROCHLORIDE 60 MG/1
60 CAPSULE, DELAYED RELEASE ORAL DAILY
Qty: 90 CAPSULE | Refills: 0
Start: 2021-09-14 | End: 2021-12-20 | Stop reason: SDUPTHER

## 2021-09-14 NOTE — TELEPHONE ENCOUNTER
Upon review of the In Basket request we were able to locate, review, and update the patient chart as requested for CRC: Colonoscopy  Any additional questions or concerns should be emailed to the Practice Liaisons via [email or web address] email, please do not reply via In Basket      Thank you  Mikaela Turner

## 2021-09-14 NOTE — TELEPHONE ENCOUNTER
1)  Dr Natan Shen has changed her cymbalta directions to 60mg every other evening to see if that helps with her balance - did update her med list    2) Also, she recommended that patient see a neurologist due to falling 4 times since 08/14/2021 - patient defers same at this time     3)  I threw out the idea for Physical Therapy for balance and falls - patient agrees to try that - would like it linked to [facility]     If this okay to ref for PT please sign the order already attached

## 2021-10-01 ENCOUNTER — TELEPHONE (OUTPATIENT)
Dept: NEPHROLOGY | Facility: CLINIC | Age: 66
End: 2021-10-01

## 2021-11-09 ENCOUNTER — IMMUNIZATIONS (OUTPATIENT)
Dept: FAMILY MEDICINE CLINIC | Facility: HOSPITAL | Age: 66
End: 2021-11-09

## 2021-11-09 DIAGNOSIS — Z23 ENCOUNTER FOR IMMUNIZATION: Primary | ICD-10-CM

## 2021-11-09 PROCEDURE — 0011A COVID-19 MODERNA VACC 0.5 ML: CPT

## 2021-11-09 PROCEDURE — 91301 COVID-19 MODERNA VACC 0.5 ML: CPT

## 2021-12-14 ENCOUNTER — APPOINTMENT (OUTPATIENT)
Dept: LAB | Facility: HOSPITAL | Age: 66
End: 2021-12-14
Attending: FAMILY MEDICINE
Payer: MEDICARE

## 2021-12-14 DIAGNOSIS — E55.9 VITAMIN D DEFICIENCY: ICD-10-CM

## 2021-12-14 DIAGNOSIS — M32.9 SYSTEMIC LUPUS ERYTHEMATOSUS, UNSPECIFIED SLE TYPE, UNSPECIFIED ORGAN INVOLVEMENT STATUS (HCC): ICD-10-CM

## 2021-12-14 DIAGNOSIS — E78.2 MIXED HYPERLIPIDEMIA: ICD-10-CM

## 2021-12-14 LAB
25(OH)D3 SERPL-MCNC: 35.4 NG/ML (ref 30–100)
ALBUMIN SERPL BCP-MCNC: 3.4 G/DL (ref 3.5–5)
ALP SERPL-CCNC: 68 U/L (ref 46–116)
ALT SERPL W P-5'-P-CCNC: 22 U/L (ref 12–78)
ANION GAP SERPL CALCULATED.3IONS-SCNC: 9 MMOL/L (ref 4–13)
AST SERPL W P-5'-P-CCNC: 21 U/L (ref 5–45)
BASOPHILS # BLD AUTO: 0.04 THOUSANDS/ΜL (ref 0–0.1)
BASOPHILS NFR BLD AUTO: 1 % (ref 0–1)
BILIRUB SERPL-MCNC: 0.92 MG/DL (ref 0.2–1)
BUN SERPL-MCNC: 13 MG/DL (ref 5–25)
C3 SERPL-MCNC: 143 MG/DL (ref 90–180)
C4 SERPL-MCNC: 42 MG/DL (ref 10–40)
CALCIUM ALBUM COR SERPL-MCNC: 9.1 MG/DL (ref 8.3–10.1)
CALCIUM SERPL-MCNC: 8.6 MG/DL (ref 8.3–10.1)
CHLORIDE SERPL-SCNC: 106 MMOL/L (ref 100–108)
CHOLEST SERPL-MCNC: 232 MG/DL
CO2 SERPL-SCNC: 27 MMOL/L (ref 21–32)
CREAT SERPL-MCNC: 1.13 MG/DL (ref 0.6–1.3)
CRP SERPL QL: 5.1 MG/L
EOSINOPHIL # BLD AUTO: 0.12 THOUSAND/ΜL (ref 0–0.61)
EOSINOPHIL NFR BLD AUTO: 2 % (ref 0–6)
ERYTHROCYTE [DISTWIDTH] IN BLOOD BY AUTOMATED COUNT: 12.7 % (ref 11.6–15.1)
ERYTHROCYTE [SEDIMENTATION RATE] IN BLOOD: 33 MM/HOUR (ref 0–29)
GFR SERPL CREATININE-BSD FRML MDRD: 50 ML/MIN/1.73SQ M
GLUCOSE P FAST SERPL-MCNC: 122 MG/DL (ref 65–99)
HCT VFR BLD AUTO: 42.7 % (ref 34.8–46.1)
HDLC SERPL-MCNC: 69 MG/DL
HGB BLD-MCNC: 13.6 G/DL (ref 11.5–15.4)
IMM GRANULOCYTES # BLD AUTO: 0.01 THOUSAND/UL (ref 0–0.2)
IMM GRANULOCYTES NFR BLD AUTO: 0 % (ref 0–2)
LDLC SERPL CALC-MCNC: 141 MG/DL (ref 0–100)
LYMPHOCYTES # BLD AUTO: 1.07 THOUSANDS/ΜL (ref 0.6–4.47)
LYMPHOCYTES NFR BLD AUTO: 22 % (ref 14–44)
MCH RBC QN AUTO: 29.5 PG (ref 26.8–34.3)
MCHC RBC AUTO-ENTMCNC: 31.9 G/DL (ref 31.4–37.4)
MCV RBC AUTO: 93 FL (ref 82–98)
MONOCYTES # BLD AUTO: 0.48 THOUSAND/ΜL (ref 0.17–1.22)
MONOCYTES NFR BLD AUTO: 10 % (ref 4–12)
NEUTROPHILS # BLD AUTO: 3.25 THOUSANDS/ΜL (ref 1.85–7.62)
NEUTS SEG NFR BLD AUTO: 65 % (ref 43–75)
NRBC BLD AUTO-RTO: 0 /100 WBCS
PLATELET # BLD AUTO: 277 THOUSANDS/UL (ref 149–390)
PMV BLD AUTO: 9 FL (ref 8.9–12.7)
POTASSIUM SERPL-SCNC: 3.6 MMOL/L (ref 3.5–5.3)
PROT SERPL-MCNC: 7.1 G/DL (ref 6.4–8.2)
RBC # BLD AUTO: 4.61 MILLION/UL (ref 3.81–5.12)
SODIUM SERPL-SCNC: 142 MMOL/L (ref 136–145)
TRIGL SERPL-MCNC: 108 MG/DL
WBC # BLD AUTO: 4.97 THOUSAND/UL (ref 4.31–10.16)

## 2021-12-14 PROCEDURE — 86225 DNA ANTIBODY NATIVE: CPT

## 2021-12-14 PROCEDURE — 86160 COMPLEMENT ANTIGEN: CPT

## 2021-12-14 PROCEDURE — 82306 VITAMIN D 25 HYDROXY: CPT

## 2021-12-14 PROCEDURE — 80061 LIPID PANEL: CPT

## 2021-12-14 PROCEDURE — 36415 COLL VENOUS BLD VENIPUNCTURE: CPT

## 2021-12-14 PROCEDURE — 86140 C-REACTIVE PROTEIN: CPT

## 2021-12-14 PROCEDURE — 85025 COMPLETE CBC W/AUTO DIFF WBC: CPT

## 2021-12-14 PROCEDURE — 80053 COMPREHEN METABOLIC PANEL: CPT

## 2021-12-14 PROCEDURE — 85652 RBC SED RATE AUTOMATED: CPT

## 2021-12-16 LAB — DSDNA AB SER-ACNC: 1 IU/ML (ref 0–9)

## 2021-12-20 ENCOUNTER — OFFICE VISIT (OUTPATIENT)
Dept: FAMILY MEDICINE CLINIC | Facility: CLINIC | Age: 66
End: 2021-12-20
Payer: MEDICARE

## 2021-12-20 VITALS
BODY MASS INDEX: 33.42 KG/M2 | TEMPERATURE: 97.4 F | HEIGHT: 61 IN | SYSTOLIC BLOOD PRESSURE: 126 MMHG | WEIGHT: 177 LBS | RESPIRATION RATE: 20 BRPM | HEART RATE: 84 BPM | DIASTOLIC BLOOD PRESSURE: 72 MMHG

## 2021-12-20 DIAGNOSIS — M35.1 MIXED CONNECTIVE TISSUE DISEASE (HCC): ICD-10-CM

## 2021-12-20 DIAGNOSIS — R27.0 ATAXIA: ICD-10-CM

## 2021-12-20 DIAGNOSIS — Z23 NEED FOR VACCINATION: Primary | ICD-10-CM

## 2021-12-20 DIAGNOSIS — M32.9 LUPUS (HCC): ICD-10-CM

## 2021-12-20 DIAGNOSIS — N18.31 STAGE 3A CHRONIC KIDNEY DISEASE (HCC): ICD-10-CM

## 2021-12-20 DIAGNOSIS — G62.9 NEUROPATHY: ICD-10-CM

## 2021-12-20 DIAGNOSIS — C50.919 MALIGNANT NEOPLASM OF FEMALE BREAST, UNSPECIFIED ESTROGEN RECEPTOR STATUS, UNSPECIFIED LATERALITY, UNSPECIFIED SITE OF BREAST (HCC): ICD-10-CM

## 2021-12-20 PROCEDURE — G0438 PPPS, INITIAL VISIT: HCPCS | Performed by: FAMILY MEDICINE

## 2021-12-20 PROCEDURE — 99213 OFFICE O/P EST LOW 20 MIN: CPT | Performed by: FAMILY MEDICINE

## 2021-12-20 PROCEDURE — 1123F ACP DISCUSS/DSCN MKR DOCD: CPT

## 2021-12-20 RX ORDER — DULOXETIN HYDROCHLORIDE 60 MG/1
60 CAPSULE, DELAYED RELEASE ORAL EVERY OTHER DAY
Qty: 90 CAPSULE | Refills: 0
Start: 2021-12-20 | End: 2022-05-23

## 2021-12-21 PROCEDURE — G0008 ADMIN INFLUENZA VIRUS VAC: HCPCS

## 2021-12-21 PROCEDURE — 90662 IIV NO PRSV INCREASED AG IM: CPT

## 2022-02-03 ENCOUNTER — APPOINTMENT (OUTPATIENT)
Dept: LAB | Facility: HOSPITAL | Age: 67
End: 2022-02-03
Attending: INTERNAL MEDICINE
Payer: MEDICARE

## 2022-02-03 DIAGNOSIS — R31.29 MICROSCOPIC HEMATURIA: ICD-10-CM

## 2022-02-03 DIAGNOSIS — R27.0 ATAXIA: ICD-10-CM

## 2022-02-03 DIAGNOSIS — M35.1 MIXED CONNECTIVE TISSUE DISEASE (HCC): ICD-10-CM

## 2022-02-03 DIAGNOSIS — E78.5 HYPERLIPIDEMIA, UNSPECIFIED HYPERLIPIDEMIA TYPE: ICD-10-CM

## 2022-02-03 DIAGNOSIS — N18.31 STAGE 3A CHRONIC KIDNEY DISEASE (HCC): ICD-10-CM

## 2022-02-03 LAB
ALBUMIN SERPL BCP-MCNC: 3.4 G/DL (ref 3.5–5)
ALP SERPL-CCNC: 66 U/L (ref 46–116)
ALT SERPL W P-5'-P-CCNC: 14 U/L (ref 12–78)
ANION GAP SERPL CALCULATED.3IONS-SCNC: 7 MMOL/L (ref 4–13)
AST SERPL W P-5'-P-CCNC: 17 U/L (ref 5–45)
BACTERIA UR QL AUTO: ABNORMAL /HPF
BILIRUB SERPL-MCNC: 1.06 MG/DL (ref 0.2–1)
BILIRUB UR QL STRIP: NEGATIVE
BUN SERPL-MCNC: 14 MG/DL (ref 5–25)
CALCIUM ALBUM COR SERPL-MCNC: 8.9 MG/DL (ref 8.3–10.1)
CALCIUM SERPL-MCNC: 8.4 MG/DL (ref 8.3–10.1)
CHLORIDE SERPL-SCNC: 104 MMOL/L (ref 100–108)
CLARITY UR: CLEAR
CO2 SERPL-SCNC: 31 MMOL/L (ref 21–32)
COLOR UR: YELLOW
CREAT SERPL-MCNC: 1.13 MG/DL (ref 0.6–1.3)
CREAT UR-MCNC: 176 MG/DL
GFR SERPL CREATININE-BSD FRML MDRD: 50 ML/MIN/1.73SQ M
GLUCOSE P FAST SERPL-MCNC: 102 MG/DL (ref 65–99)
GLUCOSE UR STRIP-MCNC: NEGATIVE MG/DL
HGB UR QL STRIP.AUTO: ABNORMAL
KETONES UR STRIP-MCNC: NEGATIVE MG/DL
LDLC SERPL DIRECT ASSAY-MCNC: 142 MG/DL (ref 0–100)
LEUKOCYTE ESTERASE UR QL STRIP: ABNORMAL
MICROALBUMIN UR-MCNC: 22.7 MG/L (ref 0–20)
MICROALBUMIN/CREAT 24H UR: 13 MG/G CREATININE (ref 0–30)
NITRITE UR QL STRIP: NEGATIVE
NON-SQ EPI CELLS URNS QL MICRO: ABNORMAL /HPF
PH UR STRIP.AUTO: 6 [PH]
POTASSIUM SERPL-SCNC: 3.6 MMOL/L (ref 3.5–5.3)
PROT SERPL-MCNC: 7.2 G/DL (ref 6.4–8.2)
PROT UR STRIP-MCNC: ABNORMAL MG/DL
PROT UR-MCNC: 59 MG/DL
PROT/CREAT UR: 0.34 MG/G{CREAT} (ref 0–0.1)
RBC #/AREA URNS AUTO: ABNORMAL /HPF
SODIUM 24H UR-SCNC: 116 MOL/L
SODIUM SERPL-SCNC: 142 MMOL/L (ref 136–145)
SP GR UR STRIP.AUTO: >=1.03 (ref 1–1.03)
T4 FREE SERPL-MCNC: 1.11 NG/DL (ref 0.76–1.46)
TSH SERPL DL<=0.05 MIU/L-ACNC: 1.44 UIU/ML (ref 0.36–3.74)
URATE SERPL-MCNC: 4.4 MG/DL (ref 2–6.8)
UROBILINOGEN UR QL STRIP.AUTO: 0.2 E.U./DL
WBC #/AREA URNS AUTO: ABNORMAL /HPF

## 2022-02-03 PROCEDURE — 80053 COMPREHEN METABOLIC PANEL: CPT

## 2022-02-03 PROCEDURE — 36415 COLL VENOUS BLD VENIPUNCTURE: CPT

## 2022-02-03 PROCEDURE — 84443 ASSAY THYROID STIM HORMONE: CPT

## 2022-02-03 PROCEDURE — 83721 ASSAY OF BLOOD LIPOPROTEIN: CPT

## 2022-02-03 PROCEDURE — 84156 ASSAY OF PROTEIN URINE: CPT

## 2022-02-03 PROCEDURE — 82570 ASSAY OF URINE CREATININE: CPT

## 2022-02-03 PROCEDURE — 84550 ASSAY OF BLOOD/URIC ACID: CPT

## 2022-02-03 PROCEDURE — 82043 UR ALBUMIN QUANTITATIVE: CPT

## 2022-02-03 PROCEDURE — 84439 ASSAY OF FREE THYROXINE: CPT

## 2022-02-03 PROCEDURE — 81001 URINALYSIS AUTO W/SCOPE: CPT

## 2022-02-03 PROCEDURE — 84300 ASSAY OF URINE SODIUM: CPT

## 2022-02-09 ENCOUNTER — OFFICE VISIT (OUTPATIENT)
Dept: NEPHROLOGY | Facility: CLINIC | Age: 67
End: 2022-02-09
Payer: MEDICARE

## 2022-02-09 VITALS
OXYGEN SATURATION: 98 % | BODY MASS INDEX: 33.42 KG/M2 | HEART RATE: 65 BPM | DIASTOLIC BLOOD PRESSURE: 72 MMHG | HEIGHT: 61 IN | SYSTOLIC BLOOD PRESSURE: 130 MMHG | WEIGHT: 177 LBS

## 2022-02-09 DIAGNOSIS — N18.31 STAGE 3A CHRONIC KIDNEY DISEASE (HCC): Primary | ICD-10-CM

## 2022-02-09 DIAGNOSIS — R31.29 MICROSCOPIC HEMATURIA: ICD-10-CM

## 2022-02-09 DIAGNOSIS — E78.5 HYPERLIPIDEMIA, UNSPECIFIED HYPERLIPIDEMIA TYPE: ICD-10-CM

## 2022-02-09 DIAGNOSIS — M35.1 MIXED CONNECTIVE TISSUE DISEASE (HCC): ICD-10-CM

## 2022-02-09 PROCEDURE — 99214 OFFICE O/P EST MOD 30 MIN: CPT | Performed by: INTERNAL MEDICINE

## 2022-02-09 RX ORDER — LOPERAMIDE HCL 1 MG/7.5ML
SOLUTION ORAL
COMMUNITY
Start: 2021-03-30

## 2022-02-09 RX ORDER — EZETIMIBE 10 MG/1
10 TABLET ORAL DAILY
Qty: 90 TABLET | Refills: 3 | Status: SHIPPED | OUTPATIENT
Start: 2022-02-09

## 2022-02-09 NOTE — PROGRESS NOTES
[facility] [place], [place]    Name: Janelle Sosa  YOB: 1955      Assessment/Plan:       Problem List Items Addressed This Visit        Genitourinary    Microscopic hematuria     Has inimical hematuria of no significance         Stage 3 chronic kidney disease [facility]) - Primary     Lab Results   Component Value Date    EGFR 50 02/03/2022    EGFR 50 12/14/2021    EGFR 52 09/02/2021    CREATININE 1 13 02/03/2022    CREATININE 1 13 12/14/2021    CREATININE 1 11 09/02/2021   Has stable chronic kidney disease stage 3a with a baseline creatinine of 1-1 1mg//dl  She is not spilling albumin in the urine  Has a small amount of tubular protein which is probably drug related  She avoids NSAIDs            Other    Mixed connective tissue disease ([address] )     Taking Plaquenil          Hyperlipidemia     Aim for an LDL < 70  She won't or can't take a statin  Try Zetia 10mg at bedtime  Explained mechanism         Relevant Medications    ezetimibe (ZETIA) 10 mg tablet            Subjective:      Patient ID: Janelle Sosa is a 78 y o  female  HPI has a history of stage 3A chronic kidney disease with a baseline creatinine of 1-1 1 mg/dl, microscopic hematuria and mixed connective tissue disease on Plaquenil    She had issues with her balance last visit and she says it is improved  The following portions of the patient's history were reviewed and updated as appropriate: allergies, current medications, past family history, past medical history, past social history, past surgical history and problem list     Review of Systems   HENT: Positive for hearing loss  Eyes: Negative for visual disturbance  Respiratory: Positive for shortness of breath  Negative for cough and chest tightness  Cardiovascular: Positive for palpitations  Negative for chest pain and leg swelling  Occasional faster HR during the day clari on climbing stairs      Gastrointestinal: Positive for diarrhea  Negative for abdominal pain, blood in stool and constipation  Intermittent and dec with Imodium   Genitourinary: Negative for difficulty urinating, dysuria, frequency and hematuria  Musculoskeletal: Positive for arthralgias and back pain  Neurological: Positive for weakness  Negative for dizziness, light-headedness and headaches  Hematological: Does not bruise/bleed easily  All other systems reviewed and are negative          Social History     Socioeconomic History    Marital status: /Civil Union     Spouse name: None    Number of children: None    Years of education: None    Highest education level: None   Occupational History    None   Tobacco Use    Smoking status: Never Smoker    Smokeless tobacco: Never Used   Vaping Use    Vaping Use: Never used   Substance and Sexual Activity    Alcohol use: Not Currently     Comment: rarely    Drug use: No    Sexual activity: None   Other Topics Concern    None   Social History Narrative    None     Social Determinants of Health     Financial Resource Strain: Not on file   Food Insecurity: Not on file   Transportation Needs: Not on file   Physical Activity: Not on file   Stress: Not on file   Social Connections: Not on file   Intimate Partner Violence: Not on file   Housing Stability: Not on file     Past Medical History:   Diagnosis Date    Breast cancer ([facility] [address] ) 03/01/2017    right     History of radiation therapy 08/01/2017    right breast     Past Surgical History:   Procedure Laterality Date    BREAST BIOPSY Left 1979    benign    BREAST LUMPECTOMY Right 03/01/2017    BREAST SURGERY      CHOLECYSTECTOMY      COLONOSCOPY      ESOPHAGOGASTRODUODENOSCOPY      ME COLONOSCOPY FLX DX W/COLLJ SPEC WHEN PFRMD N/A 10/23/2017    Procedure: COLONOSCOPY;  Surgeon: Reshma Pérez MD;  Location: [facility];  Service: Colorectal       Current Outpatient Medications:     azelastine (ASTELIN) 0 1 % nasal spray, 1 spray into each nostril daily Use in each nostril as directed, Disp: , Rfl:     Calcium Ascorbate 500 MG TABS, Take 500 mg by mouth 2 (two) times a day , Disp: , Rfl:     colestipol (COLESTID) 1 g tablet, Take 1 tablet (1 g total) by mouth daily, Disp: 90 tablet, Rfl: 2    DULoxetine (CYMBALTA) 60 mg delayed release capsule, Take 1 capsule (60 mg total) by mouth every other day, Disp: 90 capsule, Rfl: 0    fluticasone (FLONASE) 50 mcg/act nasal spray, 2 sprays into each nostril daily, Disp: , Rfl:     hydroxychloroquine (PLAQUENIL) 200 mg tablet, 200 mg 2 (two) times a day with meals , Disp: , Rfl: 0    loperamide (Imodium A-D) 2 mg/15 mL oral liquid, , Disp: , Rfl:     loratadine (CLARITIN) 10 mg tablet, Take 1 tablet (10 mg total) by mouth daily (Patient taking differently: Take 10 mg by mouth as needed ), Disp: 30 tablet, Rfl: 0    Multiple Vitamin (MULTI-VITAMIN DAILY PO), Take 500 mg by mouth daily , Disp: , Rfl:     timolol (TIMOPTIC) 0 5 % ophthalmic solution, Administer 1 drop to both eyes every 12 (twelve) hours, Disp: , Rfl:     co-enzyme Q-10 30 MG capsule, Take 30 mg by mouth daily  (Patient not taking: Reported on 9/8/2021), Disp: , Rfl:     ezetimibe (ZETIA) 10 mg tablet, Take 1 tablet (10 mg total) by mouth daily, Disp: 90 tablet, Rfl: 3    fluocinonide (LIDEX) 0 05 % external solution, Apply topically 2 (two) times a day (Patient not taking: Reported on 2/9/2022 ), Disp: , Rfl:     Lab Results   Component Value Date     10/03/2015    SODIUM 142 02/03/2022    K 3 6 02/03/2022     02/03/2022    CO2 31 02/03/2022    ANIONGAP 10 10/03/2015    AGAP 7 02/03/2022    BUN 14 02/03/2022    CREATININE 1 13 02/03/2022    GLUC 154 (H) 05/19/2019    GLUF 102 (H) 02/03/2022    CALCIUM 8 4 02/03/2022    AST 17 02/03/2022    ALT 14 02/03/2022    ALKPHOS 66 02/03/2022    PROT 7 5 10/03/2015    TP 7 2 02/03/2022    BILITOT 1 14 (H) 10/03/2015    TBILI 1 06 (H) 02/03/2022    EGFR 50 02/03/2022     Lab Results Component Value Date    WBC 4 97 12/14/2021    HGB 13 6 12/14/2021    HCT 42 7 12/14/2021    MCV 93 12/14/2021     12/14/2021     Lab Results   Component Value Date    CHOLESTEROL 232 (H) 12/14/2021    CHOLESTEROL 186 12/01/2020    CHOLESTEROL 234 (H) 05/21/2020     Lab Results   Component Value Date    HDL 69 12/14/2021    HDL 76 12/01/2020    HDL 73 (H) 04/09/2019     Lab Results   Component Value Date    LDLCALC 141 (H) 12/14/2021    LDLCALC 91 12/01/2020    LDLCALC 147 (H) 04/09/2019     Lab Results   Component Value Date    TRIG 108 12/14/2021    TRIG 96 12/01/2020    TRIG 178 (H) 04/09/2019     No results found for: [place], [place]  Lab Results   Component Value Date    [id number] 1 442 02/03/2022     Lab Results   Component Value Date    CALCIUM 8 4 02/03/2022     No results found for: SPEP, UPEP  No results found for: MICROALBUR, [id number]    PCR 0 34  Microalbumin:creatinine negative    Objective:      /72   Pulse 65   Ht 5' 1" (1 549 m)   Wt 80 3 kg (177 lb)   LMP  (LMP Unknown)   SpO2 98%   BMI 33 44 kg/m²          Physical Exam  Vitals reviewed  Constitutional:       General: She is not in acute distress  Appearance: Normal appearance  She is not toxic-appearing  HENT:      Head: Normocephalic and atraumatic  Right Ear: External ear normal       Left Ear: External ear normal    Eyes:      Extraocular Movements: Extraocular movements intact  Conjunctiva/sclera: Conjunctivae normal       Pupils: Pupils are equal, round, and reactive to light  Neck:      Vascular: No carotid bruit  Cardiovascular:      Rate and Rhythm: Normal rate and regular rhythm  Pulmonary:      Effort: Pulmonary effort is normal  No respiratory distress  Breath sounds: Normal breath sounds  No wheezing or rales  Abdominal:      General: Bowel sounds are normal  There is no distension  Palpations: Abdomen is soft  Tenderness: There is no abdominal tenderness     Musculoskeletal: General: Normal range of motion  Cervical back: Normal range of motion and neck supple  Right lower leg: No edema  Left lower leg: No edema  Lymphadenopathy:      Cervical: No cervical adenopathy  Skin:     General: Skin is warm and dry  Neurological:      General: No focal deficit present  Mental Status: She is alert and oriented to person, place, and time  Mental status is at baseline  Psychiatric:         Mood and Affect: Mood normal          Behavior: Behavior normal          Thought Content:  Thought content normal          Judgment: Judgment normal

## 2022-02-09 NOTE — ASSESSMENT & PLAN NOTE
Aim for an LDL < 70  She won't or can't take a statin  Try Zetia 10mg at bedtime  Explained mechanism

## 2022-02-09 NOTE — ASSESSMENT & PLAN NOTE
Lab Results   Component Value Date    EGFR 50 02/03/2022    EGFR 50 12/14/2021    EGFR 52 09/02/2021    CREATININE 1 13 02/03/2022    CREATININE 1 13 12/14/2021    CREATININE 1 11 09/02/2021   Has stable chronic kidney disease stage 3a with a baseline creatinine of 1-1 1mg//dl  She is not spilling albumin in the urine   Has a small amount of tubular protein which is probably drug related  She avoids NSAIDs

## 2022-02-10 ENCOUNTER — HOSPITAL ENCOUNTER (OUTPATIENT)
Dept: MAMMOGRAPHY | Facility: HOSPITAL | Age: 67
Discharge: HOME | End: 2022-02-10
Payer: MEDICARE

## 2022-02-10 VITALS — HEIGHT: 61 IN | BODY MASS INDEX: 33.42 KG/M2 | WEIGHT: 177.03 LBS

## 2022-02-10 DIAGNOSIS — Z09 FOLLOW-UP EXAM: ICD-10-CM

## 2022-02-10 DIAGNOSIS — Z85.3 PERSONAL HISTORY OF MALIGNANT NEOPLASM OF BREAST: ICD-10-CM

## 2022-02-10 PROCEDURE — 77066 DX MAMMO INCL CAD BI: CPT

## 2022-02-10 PROCEDURE — G0279 TOMOSYNTHESIS, MAMMO: HCPCS

## 2022-05-23 DIAGNOSIS — G62.9 NEUROPATHY: ICD-10-CM

## 2022-05-23 RX ORDER — DULOXETIN HYDROCHLORIDE 60 MG/1
CAPSULE, DELAYED RELEASE ORAL
Qty: 90 CAPSULE | Refills: 0 | Status: SHIPPED | OUTPATIENT
Start: 2022-05-23 | End: 2022-07-06 | Stop reason: ALTCHOICE

## 2022-07-01 ENCOUNTER — APPOINTMENT (OUTPATIENT)
Dept: LAB | Facility: HOSPITAL | Age: 67
End: 2022-07-01
Attending: INTERNAL MEDICINE
Payer: MEDICARE

## 2022-07-01 DIAGNOSIS — N18.31 STAGE 3A CHRONIC KIDNEY DISEASE (HCC): ICD-10-CM

## 2022-07-01 DIAGNOSIS — E78.5 HYPERLIPIDEMIA, UNSPECIFIED HYPERLIPIDEMIA TYPE: ICD-10-CM

## 2022-07-01 LAB
ALBUMIN SERPL BCP-MCNC: 3.3 G/DL (ref 3.5–5)
ALP SERPL-CCNC: 63 U/L (ref 46–116)
ALT SERPL W P-5'-P-CCNC: 29 U/L (ref 12–78)
ANION GAP SERPL CALCULATED.3IONS-SCNC: 6 MMOL/L (ref 4–13)
AST SERPL W P-5'-P-CCNC: 19 U/L (ref 5–45)
BACTERIA UR QL AUTO: ABNORMAL /HPF
BASOPHILS # BLD AUTO: 0.05 THOUSANDS/ΜL (ref 0–0.1)
BASOPHILS NFR BLD AUTO: 1 % (ref 0–1)
BILIRUB SERPL-MCNC: 0.86 MG/DL (ref 0.2–1)
BILIRUB UR QL STRIP: NEGATIVE
BUN SERPL-MCNC: 13 MG/DL (ref 5–25)
CALCIUM ALBUM COR SERPL-MCNC: 9 MG/DL (ref 8.3–10.1)
CALCIUM SERPL-MCNC: 8.4 MG/DL (ref 8.3–10.1)
CHLORIDE SERPL-SCNC: 106 MMOL/L (ref 100–108)
CLARITY UR: ABNORMAL
CO2 SERPL-SCNC: 29 MMOL/L (ref 21–32)
COLOR UR: YELLOW
CREAT SERPL-MCNC: 1.07 MG/DL (ref 0.6–1.3)
EOSINOPHIL # BLD AUTO: 0.13 THOUSAND/ΜL (ref 0–0.61)
EOSINOPHIL NFR BLD AUTO: 3 % (ref 0–6)
ERYTHROCYTE [DISTWIDTH] IN BLOOD BY AUTOMATED COUNT: 13.6 % (ref 11.6–15.1)
GFR SERPL CREATININE-BSD FRML MDRD: 53 ML/MIN/1.73SQ M
GLUCOSE P FAST SERPL-MCNC: 122 MG/DL (ref 65–99)
GLUCOSE UR STRIP-MCNC: NEGATIVE MG/DL
HCT VFR BLD AUTO: 41.8 % (ref 34.8–46.1)
HGB BLD-MCNC: 13.1 G/DL (ref 11.5–15.4)
HGB UR QL STRIP.AUTO: ABNORMAL
IMM GRANULOCYTES # BLD AUTO: 0.02 THOUSAND/UL (ref 0–0.2)
IMM GRANULOCYTES NFR BLD AUTO: 0 % (ref 0–2)
KETONES UR STRIP-MCNC: NEGATIVE MG/DL
LDLC SERPL DIRECT ASSAY-MCNC: 92 MG/DL (ref 0–100)
LEUKOCYTE ESTERASE UR QL STRIP: NEGATIVE
LYMPHOCYTES # BLD AUTO: 1.06 THOUSANDS/ΜL (ref 0.6–4.47)
LYMPHOCYTES NFR BLD AUTO: 21 % (ref 14–44)
MCH RBC QN AUTO: 29.4 PG (ref 26.8–34.3)
MCHC RBC AUTO-ENTMCNC: 31.3 G/DL (ref 31.4–37.4)
MCV RBC AUTO: 94 FL (ref 82–98)
MONOCYTES # BLD AUTO: 0.5 THOUSAND/ΜL (ref 0.17–1.22)
MONOCYTES NFR BLD AUTO: 10 % (ref 4–12)
NEUTROPHILS # BLD AUTO: 3.22 THOUSANDS/ΜL (ref 1.85–7.62)
NEUTS SEG NFR BLD AUTO: 65 % (ref 43–75)
NITRITE UR QL STRIP: NEGATIVE
NON-SQ EPI CELLS URNS QL MICRO: ABNORMAL /HPF
NRBC BLD AUTO-RTO: 0 /100 WBCS
PH UR STRIP.AUTO: 5.5 [PH]
PLATELET # BLD AUTO: 269 THOUSANDS/UL (ref 149–390)
PMV BLD AUTO: 9.3 FL (ref 8.9–12.7)
POTASSIUM SERPL-SCNC: 4.2 MMOL/L (ref 3.5–5.3)
PROT SERPL-MCNC: 6.9 G/DL (ref 6.4–8.2)
PROT UR STRIP-MCNC: NEGATIVE MG/DL
RBC # BLD AUTO: 4.46 MILLION/UL (ref 3.81–5.12)
RBC #/AREA URNS AUTO: ABNORMAL /HPF
SODIUM SERPL-SCNC: 141 MMOL/L (ref 136–145)
SP GR UR STRIP.AUTO: >=1.03 (ref 1–1.03)
URATE SERPL-MCNC: 4.9 MG/DL (ref 2–6.8)
UROBILINOGEN UR QL STRIP.AUTO: 0.2 E.U./DL
WBC # BLD AUTO: 4.98 THOUSAND/UL (ref 4.31–10.16)
WBC #/AREA URNS AUTO: ABNORMAL /HPF

## 2022-07-01 PROCEDURE — 84550 ASSAY OF BLOOD/URIC ACID: CPT

## 2022-07-01 PROCEDURE — 36415 COLL VENOUS BLD VENIPUNCTURE: CPT

## 2022-07-01 PROCEDURE — 82043 UR ALBUMIN QUANTITATIVE: CPT

## 2022-07-01 PROCEDURE — 85025 COMPLETE CBC W/AUTO DIFF WBC: CPT

## 2022-07-01 PROCEDURE — 82570 ASSAY OF URINE CREATININE: CPT

## 2022-07-01 PROCEDURE — 84156 ASSAY OF PROTEIN URINE: CPT

## 2022-07-01 PROCEDURE — 81001 URINALYSIS AUTO W/SCOPE: CPT

## 2022-07-01 PROCEDURE — 80053 COMPREHEN METABOLIC PANEL: CPT

## 2022-07-01 PROCEDURE — 83721 ASSAY OF BLOOD LIPOPROTEIN: CPT

## 2022-07-02 LAB
CREAT UR-MCNC: 183 MG/DL
CREAT UR-MCNC: 183 MG/DL
MICROALBUMIN UR-MCNC: 9.2 MG/L (ref 0–20)
MICROALBUMIN/CREAT 24H UR: 5 MG/G CREATININE (ref 0–30)
PROT UR-MCNC: 57 MG/DL
PROT/CREAT UR: 0.31 MG/G{CREAT} (ref 0–0.1)

## 2022-07-06 ENCOUNTER — OFFICE VISIT (OUTPATIENT)
Dept: FAMILY MEDICINE CLINIC | Facility: CLINIC | Age: 67
End: 2022-07-06
Payer: MEDICARE

## 2022-07-06 VITALS
TEMPERATURE: 98.9 F | SYSTOLIC BLOOD PRESSURE: 140 MMHG | BODY MASS INDEX: 33.99 KG/M2 | HEART RATE: 80 BPM | HEIGHT: 61 IN | RESPIRATION RATE: 20 BRPM | WEIGHT: 180 LBS | DIASTOLIC BLOOD PRESSURE: 82 MMHG

## 2022-07-06 DIAGNOSIS — M35.1 MIXED CONNECTIVE TISSUE DISEASE (HCC): ICD-10-CM

## 2022-07-06 DIAGNOSIS — N18.31 STAGE 3A CHRONIC KIDNEY DISEASE (HCC): ICD-10-CM

## 2022-07-06 DIAGNOSIS — F32.A DEPRESSION, UNSPECIFIED DEPRESSION TYPE: Primary | ICD-10-CM

## 2022-07-06 DIAGNOSIS — E78.5 HYPERLIPIDEMIA, UNSPECIFIED HYPERLIPIDEMIA TYPE: ICD-10-CM

## 2022-07-06 DIAGNOSIS — Z98.890 HISTORY OF ESOPHAGEAL DILATATION: ICD-10-CM

## 2022-07-06 DIAGNOSIS — M32.9 LUPUS (HCC): ICD-10-CM

## 2022-07-06 DIAGNOSIS — R03.0 ELEVATED BLOOD PRESSURE READING: ICD-10-CM

## 2022-07-06 DIAGNOSIS — R49.9 CHANGE IN VOICE: ICD-10-CM

## 2022-07-06 DIAGNOSIS — G62.9 NEUROPATHY: ICD-10-CM

## 2022-07-06 DIAGNOSIS — E66.9 CLASS 1 OBESITY: ICD-10-CM

## 2022-07-06 DIAGNOSIS — C50.919 MALIGNANT NEOPLASM OF FEMALE BREAST, UNSPECIFIED ESTROGEN RECEPTOR STATUS, UNSPECIFIED LATERALITY, UNSPECIFIED SITE OF BREAST (HCC): ICD-10-CM

## 2022-07-06 PROCEDURE — 99214 OFFICE O/P EST MOD 30 MIN: CPT | Performed by: FAMILY MEDICINE

## 2022-07-06 RX ORDER — SERTRALINE HYDROCHLORIDE 25 MG/1
25 TABLET, FILM COATED ORAL DAILY
Qty: 90 TABLET | Refills: 3 | Status: SHIPPED | OUTPATIENT
Start: 2022-07-06 | End: 2022-08-30

## 2022-07-06 RX ORDER — DULOXETIN HYDROCHLORIDE 60 MG/1
60 CAPSULE, DELAYED RELEASE ORAL DAILY
Qty: 90 CAPSULE | Refills: 1 | Status: CANCELLED | OUTPATIENT
Start: 2022-07-06

## 2022-07-06 NOTE — PROGRESS NOTES
Assessment/Plan:  Patient has an elevated blood pressure reading today is followed by nephropathy 4 stage 3 chronic kidney disease and microscopic hematuria  Advised the patient to contact Nephrology for blood pressure remains elevated offered to have her blood pressure checked at her office at her convenience      Patient has a mixed connective tissue disease and lupus treated by rheumatology rheumatology evaluation of April 12, 2022 has been reviewed and is appreciated the patient will continue on Plaquenil 200 mg daily    Morbid obesity with BMI of 34 01 diet has been discussed    Anxiety and depression the patient states that she had been on Zoloft 25 mg in the past and felt significant improvement will continue with the trial of Zoloft 25 mg daily    History of malignant neoplasm of the breast currently in remission    History of peripheral neuropathy had been treated with Cymbalta however the patient states she felt fatigued with Cymbalta will be replacing Cymbalta with Zoloft 25 mg    History of hyperlipidemia the LDL cholesterol as improved February reading was 142 July 1st is down to 80    The patient has had a change in her voice will be referring to ENT for evaluation    The patient follows with Gastroenterology has a history of esophageal dilatation gastroenterology records are not available at this time    Problem List Items Addressed This Visit        Genitourinary    Stage 3 chronic kidney disease ([address] )       Other    Mixed connective tissue disease ([address] )    Class 1 obesity    Hyperlipidemia    Malignant neoplasm of female breast, unspecified estrogen receptor status, unspecified laterality, unspecified site of breast ([address] )      Other Visit Diagnoses     Depression, unspecified depression type    -  Primary    Relevant Medications    sertraline (Zoloft) 25 mg tablet    Elevated blood pressure reading        Lupus ([address] )        Neuropathy        Change in voice        Relevant Orders    Ambulatory Referral to Otolaryngology    History of esophageal dilatation               Diagnoses and all orders for this visit:    Depression, unspecified depression type  -     sertraline (Zoloft) 25 mg tablet; Take 1 tablet (25 mg total) by mouth daily    Stage 3a chronic kidney disease (HCC)    Elevated blood pressure reading    Lupus (HCC)    Mixed connective tissue disease ([address] )    Malignant neoplasm of female breast, unspecified estrogen receptor status, unspecified laterality, unspecified site of breast ([facility] )    Neuropathy    Class 1 obesity    Hyperlipidemia, unspecified hyperlipidemia type    Change in voice  -     Ambulatory Referral to Otolaryngology; Future    History of esophageal dilatation      No problem-specific Assessment & Plan notes found for this encounter  PHQ-2/9 Depression Screening    Little interest or pleasure in doing things: 0 - not at all  Feeling down, depressed, or hopeless: 0 - not at all  PHQ-2 Score: 0  PHQ-2 Interpretation: Negative depression screen          Body mass index is 34 01 kg/m²  BMI Counseling: Body mass index is 34 01 kg/m²  The BMI     Subjective:      Patient ID: Sherren Asters is a 78 y o  female  Patient presents for six-month follow-up has a history of stage 3 chronic kidney disease microscopic hematuria hyperlipidemia with history of breast cancer and lupus  Patient states that she has been somewhat stressed and is anxious and depressed      The following portions of the patient's history were reviewed and updated as appropriate:   She has a past medical history of Breast cancer ([facility] ) (03/01/2017) and History of radiation therapy (08/01/2017)  ,  does not have any pertinent problems on file  ,   has a past surgical history that includes Cholecystectomy; Colonoscopy; Esophagogastroduodenoscopy; pr colonoscopy flx dx w/collj spec when pfrmd (N/A, 10/23/2017); Breast surgery; Breast lumpectomy (Right, 03/01/2017);  Breast biopsy (Left, 1979); and Breast excisional biopsy (Right, 04/2017)  ,  family history includes BRCA1 Negative in her sister; BRCA2 Negative in her sister; Breast cancer (age of onset: 48) in her cousin; Breast cancer (age of onset: 64) in her sister; Breast cancer (age of onset: 76) in her maternal grandmother; Uterine cancer in her sister  ,   reports that she has never smoked  She has never used smokeless tobacco  She reports previous alcohol use  She reports that she does not use drugs  ,  is allergic to bactrim [sulfamethoxazole-trimethoprim], other, molds & smuts, pollen extract, and wound dressings     Current Outpatient Medications   Medication Sig Dispense Refill    sertraline (Zoloft) 25 mg tablet Take 1 tablet (25 mg total) by mouth daily 90 tablet 3    azelastine (ASTELIN) 0 1 % nasal spray 1 spray into each nostril daily Use in each nostril as directed      Calcium Ascorbate 500 MG TABS Take 500 mg by mouth 2 (two) times a day       co-enzyme Q-10 30 MG capsule Take 30 mg by mouth daily  (Patient not taking: Reported on 9/8/2021)      colestipol (COLESTID) 1 g tablet Take 1 tablet (1 g total) by mouth daily 90 tablet 2    ezetimibe (ZETIA) 10 mg tablet Take 1 tablet (10 mg total) by mouth daily 90 tablet 3    fluocinonide (LIDEX) 0 05 % external solution Apply topically 2 (two) times a day (Patient not taking: Reported on 2/9/2022 )      fluticasone (FLONASE) 50 mcg/act nasal spray 2 sprays into each nostril daily      hydroxychloroquine (PLAQUENIL) 200 mg tablet 200 mg 2 (two) times a day with meals   0    loperamide (Imodium A-D) 2 mg/15 mL oral liquid       loratadine (CLARITIN) 10 mg tablet Take 1 tablet (10 mg total) by mouth daily (Patient taking differently: Take 10 mg by mouth as needed ) 30 tablet 0    Multiple Vitamin (MULTI-VITAMIN DAILY PO) Take 500 mg by mouth daily       timolol (TIMOPTIC) 0 5 % ophthalmic solution Administer 1 drop to both eyes every 12 (twelve) hours       No current facility-administered medications for this visit  Review of Systems   Constitutional: Negative for chills and fever  HENT: Positive for rhinorrhea  Negative for ear pain and sore throat  Eyes: Negative for pain and visual disturbance  Respiratory: Negative for cough and shortness of breath  Cardiovascular: Negative for chest pain and palpitations  Gastrointestinal: Negative for abdominal pain and vomiting  Genitourinary: Negative for dysuria and hematuria  Musculoskeletal: Positive for arthralgias and myalgias  Negative for back pain  Skin: Negative for color change and rash  Neurological: Negative for seizures and syncope  Psychiatric/Behavioral: Positive for dysphoric mood  The patient is nervous/anxious  Has been under stress with her has been receiving in knee replacement   All other systems reviewed and are negative  Objective:    /82   Pulse 80   Temp 98 9 °F (37 2 °C)   Resp 20   Ht 5' 1" (1 549 m)   Wt 81 6 kg (180 lb)   LMP  (LMP Unknown)   BMI 34 01 kg/m²   Body mass index is 34 01 kg/m²  Physical Exam  Constitutional:       Appearance: She is well-developed  She is obese  HENT:      Head: Normocephalic  Eyes:      Pupils: Pupils are equal, round, and reactive to light  Cardiovascular:      Rate and Rhythm: Normal rate and regular rhythm  Heart sounds: Normal heart sounds  Pulmonary:      Effort: Pulmonary effort is normal       Breath sounds: Normal breath sounds  Abdominal:      General: Bowel sounds are normal       Palpations: Abdomen is soft  Tenderness: There is no abdominal tenderness  Musculoskeletal:      Cervical back: Normal range of motion  Skin:     General: Skin is warm  Neurological:      Mental Status: She is alert and oriented to person, place, and time

## 2022-07-08 ENCOUNTER — TELEPHONE (OUTPATIENT)
Dept: OTOLARYNGOLOGY | Facility: CLINIC | Age: 67
End: 2022-07-08

## 2022-07-08 NOTE — TELEPHONE ENCOUNTER
GINA for Jenny Hassan to call [phone] to schedule her consult with ENT    Jenny Hassan called back and is scheduled for 8/30/22

## 2022-08-23 ENCOUNTER — APPOINTMENT (OUTPATIENT)
Dept: LAB | Facility: HOSPITAL | Age: 67
End: 2022-08-23
Payer: MEDICARE

## 2022-08-23 DIAGNOSIS — K21.9 GASTROESOPHAGEAL REFLUX DISEASE WITHOUT ESOPHAGITIS: ICD-10-CM

## 2022-08-23 DIAGNOSIS — R19.7 DIARRHEA, UNSPECIFIED TYPE: ICD-10-CM

## 2022-08-23 DIAGNOSIS — K52.839 MICROSCOPIC COLITIS, UNSPECIFIED MICROSCOPIC COLITIS TYPE: ICD-10-CM

## 2022-08-23 DIAGNOSIS — R15.2 RECTAL URGENCY: ICD-10-CM

## 2022-08-23 LAB
CRP SERPL QL: 3.6 MG/L
ERYTHROCYTE [SEDIMENTATION RATE] IN BLOOD: 38 MM/HOUR (ref 0–29)

## 2022-08-23 PROCEDURE — 85652 RBC SED RATE AUTOMATED: CPT

## 2022-08-23 PROCEDURE — 36415 COLL VENOUS BLD VENIPUNCTURE: CPT

## 2022-08-23 PROCEDURE — 86140 C-REACTIVE PROTEIN: CPT

## 2022-08-24 ENCOUNTER — APPOINTMENT (OUTPATIENT)
Dept: LAB | Facility: HOSPITAL | Age: 67
End: 2022-08-24
Payer: MEDICARE

## 2022-08-24 DIAGNOSIS — R19.7 DIARRHEA, UNSPECIFIED TYPE: ICD-10-CM

## 2022-08-24 DIAGNOSIS — K52.839 MICROSCOPIC COLITIS, UNSPECIFIED MICROSCOPIC COLITIS TYPE: ICD-10-CM

## 2022-08-24 DIAGNOSIS — R15.2 FECAL URGENCY: ICD-10-CM

## 2022-08-24 PROCEDURE — 83993 ASSAY FOR CALPROTECTIN FECAL: CPT

## 2022-08-30 ENCOUNTER — OFFICE VISIT (OUTPATIENT)
Dept: AUDIOLOGY | Facility: CLINIC | Age: 67
End: 2022-08-30
Payer: MEDICARE

## 2022-08-30 ENCOUNTER — OFFICE VISIT (OUTPATIENT)
Dept: OTOLARYNGOLOGY | Facility: CLINIC | Age: 67
End: 2022-08-30
Payer: MEDICARE

## 2022-08-30 VITALS
SYSTOLIC BLOOD PRESSURE: 100 MMHG | OXYGEN SATURATION: 95 % | HEIGHT: 61 IN | TEMPERATURE: 96.7 F | DIASTOLIC BLOOD PRESSURE: 64 MMHG | HEART RATE: 91 BPM | BODY MASS INDEX: 33.42 KG/M2 | WEIGHT: 177 LBS

## 2022-08-30 DIAGNOSIS — R49.9 CHANGE IN VOICE: ICD-10-CM

## 2022-08-30 DIAGNOSIS — R49.0 HOARSENESS: ICD-10-CM

## 2022-08-30 DIAGNOSIS — K21.9 LPRD (LARYNGOPHARYNGEAL REFLUX DISEASE): Primary | ICD-10-CM

## 2022-08-30 DIAGNOSIS — H90.3 SENSORY HEARING LOSS, BILATERAL: Primary | ICD-10-CM

## 2022-08-30 DIAGNOSIS — H93.8X3 PRESSURE SENSATION IN BOTH EARS: ICD-10-CM

## 2022-08-30 DIAGNOSIS — M26.629 TMJ SYNDROME: ICD-10-CM

## 2022-08-30 PROCEDURE — 99203 OFFICE O/P NEW LOW 30 MIN: CPT | Performed by: OTOLARYNGOLOGY

## 2022-08-30 PROCEDURE — 92567 TYMPANOMETRY: CPT | Performed by: AUDIOLOGIST-HEARING AID FITTER

## 2022-08-30 PROCEDURE — 92557 COMPREHENSIVE HEARING TEST: CPT | Performed by: AUDIOLOGIST-HEARING AID FITTER

## 2022-08-30 RX ORDER — DULOXETIN HYDROCHLORIDE 20 MG/1
20 CAPSULE, DELAYED RELEASE ORAL DAILY
COMMUNITY

## 2022-08-30 RX ORDER — FAMOTIDINE 40 MG/1
40 TABLET, FILM COATED ORAL
Qty: 30 TABLET | Refills: 3 | Status: SHIPPED | OUTPATIENT
Start: 2022-08-30

## 2022-08-30 NOTE — PROGRESS NOTES
Otolaryngology Clinic Visit  Name:  Marya Espinal  MRN:  [id number]  Date:  8/30/2022 10:23 AM  ________________________________________________________________________       CHIEF COMPLAINT:   Change in voice     HPI:  Marya Espinal is a 75 y o  female presents as a new patient with concerns of change in voice  Has been there intermittent over the past year  Worse in the morning  Associated with throat clearing, indigestion  Patient reports prior EGD which showed Schatzki's ring  Patient was prescribed omeprazole however stopped taking it she is trying to limit the number feel she is taking  She also did not notice much benefit  Patient also with bilateral ear pressure  Most notably she was recently at the dentist and had trouble opening her mouth approximately 1 week after  Patient is a nonsmoker      PMHx:  Past Medical History:   Diagnosis Date    Breast cancer ([address] ) 03/01/2017    right breast intraductal [facility]    History of radiation therapy 08/01/2017    right breast       PSHx:  Past Surgical History:   Procedure Laterality Date    BREAST BIOPSY Left 1979    benign    BREAST EXCISIONAL BIOPSY Right 04/2017    intraductal carcinoma needle loc performed at [facility]    BREAST LUMPECTOMY Right 03/01/2017    BREAST SURGERY      CHOLECYSTECTOMY      COLONOSCOPY      ESOPHAGOGASTRODUODENOSCOPY      [place] COLONOSCOPY FLX DX W/COLLJ SPEC WHEN PFRMD N/A 10/23/2017    Procedure: COLONOSCOPY;  Surgeon: Lashawn Parish MD;  Location: [facility];  Service: Colorectal       FAMHx:  Family History   Problem Relation Age of Onset    Breast cancer Sister 64    Uterine cancer Sister     BRCA1 Negative Sister     BRCA2 Negative Sister     Breast cancer Maternal Grandmother 76    Breast cancer Cousin 48       SOCHx:  Social History     Socioeconomic History    Marital status: /Civil Union     Spouse name: None    Number of children: None    Years of education: None    Highest education level: None   Occupational History    None   Tobacco Use    Smoking status: Never Smoker    Smokeless tobacco: Never Used   Vaping Use    Vaping Use: Never used   Substance and Sexual Activity    Alcohol use: Not Currently     Comment: rarely    Drug use: No    Sexual activity: None   Other Topics Concern    None   Social History Narrative    None     Social Determinants of Health     Financial Resource Strain: Not on file   Food Insecurity: Not on file   Transportation Needs: Not on file   Physical Activity: Not on file   Stress: Not on file   Social Connections: Not on file   Intimate Partner Violence: Not on file   Housing Stability: Not on file       Allergies: Allergies   Allergen Reactions    Bactrim [Sulfamethoxazole-Trimethoprim] Anaphylaxis and Swelling     Swelling of throat and nose; itching    Other      clear tape _blisters    Peaches, potatoes, apples- per allergy testing per pt    Molds & Smuts     Pollen Extract Itching    Wound Dressings Rash        MEDS:  Reviewed    ROS:  As above otherwise:   See ROS in encounter by MA       PHYSICAL EXAM:  /64 (BP Location: Left arm, Cuff Size: Large)   Pulse 91   Temp (!) 96 7 °F (35 9 °C) (Temporal)   Ht 5' 1" (1 549 m)   Wt 80 3 kg (177 lb)   LMP  (LMP Unknown)   SpO2 95%   BMI 33 44 kg/m²   General: NAD, AOx4  Eyes:  EOMI  Ears:  Right: ear canal normal, TM normal apperance, no fluid  Left: ear canal normal, TM normal apperance, no fluid  Nose:  No septal deviation, no inferior turbinate hypertrophy, no mass/lesions  Oral cavity:  No trismus, no mass/lesions tenderness to palpation of bilateral TMJ joint  Neck: Trachea is midline; no thyroid nodules, Salivary glands symmetrical, no masses/abnormality on palpation  Lymph:  No cervical lymphadenopathy  Skin:  No obvious facial lesions  Neuro: Face symmetrical, no obvious cranial nerve palsy   No focal deficits   Lungs:  Normal work of breathing, symmetrical chest expansion  Vascular: Well perfused    Procedures:  none     Medical Data Reviewed:  Records reviewed and summarized as in EPIC    Radiology:  none    Labs: Audiogram from Today  Mild to moderate SNHL bilaterally  Tympanogram from Today  Type a b/l  Reviewed personally with patient and audiology       Patient Active Problem List   Diagnosis    Change in bowel habits    Microscopic hematuria    Mixed [facility]    Stage [facility]    Class 1 obesity    Ataxia    Hyperlipidemia    Malignant neoplasm of female breast, unspecified estrogen receptor status, unspecified laterality, unspecified site of breast [facility])       ASSESSMENT/PLAN:  Rosalio Carias is a 75 y o  female with acute and chronic problems as above who presents with:    1  LPRD (laryngopharyngeal reflux disease)    2  Change in voice    3  Hoarseness    4  Pressure sensation in both ears    5  TMJ syndrome        Regarding the change in voice patient's symptoms most likely related to LPRD given the history and physical examination findings above  Prescribed famotidine 40 mg Q HS  Regarding the ear pressure, most likely related TMJ syndrome as evidence by tenderness to palpation of the joint on physical examination  Recommended she Dr Paulette Vieyra about this for possible   Patient unable to take NSAIDs at this time  Discussed she can try Tylenol although it may be of only mild improvement and discussed warm compresses as needed  Also discussed she could try soft diet if symptoms remain severe

## 2022-08-30 NOTE — PROGRESS NOTES
ENT HEARING EVALUATION    Name:  Theresia Soulier  :  1955  Age:  75 y o  Date of Evaluation: 22     History: ENT Audiogram  Reason for visit: Theresia Soulier is being seen today at the request of Dr Tanner Fierro for an evaluation of hearing as part of their initial ENT visit  EVALUATION:    Tympanometry:   Right: Type A - normal middle ear pressure and compliance   Left: Type A - normal middle ear pressure and compliance    Audiogram Results:  Pure tone testing revealed a mild sloping to moderately severe sensorineural hearing loss bilaterally  SRT and PTA are in agreement indicating good test reliability  Word recognition scores were excellent bilaterally      *see attached audiogram      RECOMMENDATIONS:  Consult ENT and Return to [facility]  for F/U      Marielle Worthy   Clinical Audiologist

## 2022-08-30 NOTE — PROGRESS NOTES
Review of Systems   Constitutional: Negative  HENT: Positive for ear pain, postnasal drip, rhinorrhea and voice change  Eyes: Negative  Respiratory: Positive for cough  Cardiovascular: Negative  Gastrointestinal: Negative  Endocrine: Negative  Genitourinary: Negative  Musculoskeletal: Negative  Skin: Negative  Allergic/Immunologic: Negative  Neurological: Positive for headaches  Hematological: Negative  Psychiatric/Behavioral: Negative

## 2022-08-31 LAB — CALPROTECTIN STL-MCNT: 18 UG/G (ref 0–120)

## 2022-11-25 ENCOUNTER — TELEPHONE (OUTPATIENT)
Dept: OTHER | Facility: OTHER | Age: 67
End: 2022-11-25

## 2022-12-20 ENCOUNTER — TELEPHONE (OUTPATIENT)
Dept: FAMILY MEDICINE CLINIC | Facility: CLINIC | Age: 67
End: 2022-12-20

## 2022-12-20 DIAGNOSIS — E78.5 HYPERLIPIDEMIA, UNSPECIFIED HYPERLIPIDEMIA TYPE: ICD-10-CM

## 2022-12-20 DIAGNOSIS — Z13.29 SCREENING FOR THYROID DISORDER: ICD-10-CM

## 2022-12-20 DIAGNOSIS — Z13.220 SCREENING FOR CHOLESTEROL LEVEL: ICD-10-CM

## 2022-12-20 DIAGNOSIS — N18.31 STAGE 3A CHRONIC KIDNEY DISEASE (HCC): Primary | ICD-10-CM

## 2022-12-20 DIAGNOSIS — R03.0 ELEVATED BLOOD PRESSURE READING: ICD-10-CM

## 2022-12-20 DIAGNOSIS — E55.9 VITAMIN D DEFICIENCY: ICD-10-CM

## 2022-12-20 NOTE — TELEPHONE ENCOUNTER
Patient went to have blood work done and there was none in system - do you want her to have some done prior to her apt on 12/27/2022

## 2022-12-22 ENCOUNTER — APPOINTMENT (OUTPATIENT)
Dept: LAB | Facility: HOSPITAL | Age: 67
End: 2022-12-22
Attending: FAMILY MEDICINE

## 2022-12-22 DIAGNOSIS — R03.0 ELEVATED BLOOD PRESSURE READING: ICD-10-CM

## 2022-12-22 DIAGNOSIS — Z13.220 SCREENING FOR CHOLESTEROL LEVEL: ICD-10-CM

## 2022-12-22 DIAGNOSIS — N18.31 STAGE 3A CHRONIC KIDNEY DISEASE (HCC): ICD-10-CM

## 2022-12-22 DIAGNOSIS — E78.5 HYPERLIPIDEMIA, UNSPECIFIED HYPERLIPIDEMIA TYPE: ICD-10-CM

## 2022-12-22 DIAGNOSIS — E55.9 VITAMIN D DEFICIENCY: ICD-10-CM

## 2022-12-22 DIAGNOSIS — Z13.29 SCREENING FOR THYROID DISORDER: ICD-10-CM

## 2022-12-22 LAB
25(OH)D3 SERPL-MCNC: 26.2 NG/ML (ref 30–100)
ALBUMIN SERPL BCP-MCNC: 3.3 G/DL (ref 3.5–5)
ALP SERPL-CCNC: 65 U/L (ref 46–116)
ALT SERPL W P-5'-P-CCNC: 33 U/L (ref 12–78)
ANION GAP SERPL CALCULATED.3IONS-SCNC: 4 MMOL/L (ref 4–13)
AST SERPL W P-5'-P-CCNC: 24 U/L (ref 5–45)
BASOPHILS # BLD AUTO: 0.04 THOUSANDS/ÂΜL (ref 0–0.1)
BASOPHILS NFR BLD AUTO: 1 % (ref 0–1)
BILIRUB SERPL-MCNC: 0.95 MG/DL (ref 0.2–1)
BUN SERPL-MCNC: 12 MG/DL (ref 5–25)
CALCIUM ALBUM COR SERPL-MCNC: 9.4 MG/DL (ref 8.3–10.1)
CALCIUM SERPL-MCNC: 8.8 MG/DL (ref 8.3–10.1)
CHLORIDE SERPL-SCNC: 106 MMOL/L (ref 96–108)
CHOLEST SERPL-MCNC: 201 MG/DL
CO2 SERPL-SCNC: 31 MMOL/L (ref 21–32)
CREAT SERPL-MCNC: 1.1 MG/DL (ref 0.6–1.3)
EOSINOPHIL # BLD AUTO: 0.11 THOUSAND/ÂΜL (ref 0–0.61)
EOSINOPHIL NFR BLD AUTO: 2 % (ref 0–6)
ERYTHROCYTE [DISTWIDTH] IN BLOOD BY AUTOMATED COUNT: 13.7 % (ref 11.6–15.1)
GFR SERPL CREATININE-BSD FRML MDRD: 51 ML/MIN/1.73SQ M
GLUCOSE P FAST SERPL-MCNC: 109 MG/DL (ref 65–99)
HCT VFR BLD AUTO: 42.5 % (ref 34.8–46.1)
HDLC SERPL-MCNC: 85 MG/DL
HGB BLD-MCNC: 13.8 G/DL (ref 11.5–15.4)
IMM GRANULOCYTES # BLD AUTO: 0.01 THOUSAND/UL (ref 0–0.2)
IMM GRANULOCYTES NFR BLD AUTO: 0 % (ref 0–2)
LDLC SERPL CALC-MCNC: 97 MG/DL (ref 0–100)
LYMPHOCYTES # BLD AUTO: 0.91 THOUSANDS/ÂΜL (ref 0.6–4.47)
LYMPHOCYTES NFR BLD AUTO: 19 % (ref 14–44)
MCH RBC QN AUTO: 30.7 PG (ref 26.8–34.3)
MCHC RBC AUTO-ENTMCNC: 32.5 G/DL (ref 31.4–37.4)
MCV RBC AUTO: 95 FL (ref 82–98)
MONOCYTES # BLD AUTO: 0.49 THOUSAND/ÂΜL (ref 0.17–1.22)
MONOCYTES NFR BLD AUTO: 10 % (ref 4–12)
NEUTROPHILS # BLD AUTO: 3.29 THOUSANDS/ÂΜL (ref 1.85–7.62)
NEUTS SEG NFR BLD AUTO: 68 % (ref 43–75)
NRBC BLD AUTO-RTO: 0 /100 WBCS
PLATELET # BLD AUTO: 261 THOUSANDS/UL (ref 149–390)
PMV BLD AUTO: 8.9 FL (ref 8.9–12.7)
POTASSIUM SERPL-SCNC: 3.9 MMOL/L (ref 3.5–5.3)
PROT SERPL-MCNC: 6.9 G/DL (ref 6.4–8.4)
RBC # BLD AUTO: 4.49 MILLION/UL (ref 3.81–5.12)
SODIUM SERPL-SCNC: 141 MMOL/L (ref 135–147)
TRIGL SERPL-MCNC: 95 MG/DL
TSH SERPL DL<=0.05 MIU/L-ACNC: 1.9 UIU/ML (ref 0.45–4.5)
WBC # BLD AUTO: 4.85 THOUSAND/UL (ref 4.31–10.16)

## 2022-12-27 ENCOUNTER — OFFICE VISIT (OUTPATIENT)
Dept: FAMILY MEDICINE CLINIC | Facility: CLINIC | Age: 67
End: 2022-12-27

## 2022-12-27 VITALS
TEMPERATURE: 97.7 F | SYSTOLIC BLOOD PRESSURE: 126 MMHG | WEIGHT: 180 LBS | HEIGHT: 61 IN | DIASTOLIC BLOOD PRESSURE: 74 MMHG | HEART RATE: 84 BPM | BODY MASS INDEX: 33.99 KG/M2 | RESPIRATION RATE: 20 BRPM

## 2022-12-27 DIAGNOSIS — F32.A DEPRESSION, UNSPECIFIED DEPRESSION TYPE: ICD-10-CM

## 2022-12-27 DIAGNOSIS — R06.02 SHORTNESS OF BREATH: ICD-10-CM

## 2022-12-27 DIAGNOSIS — M35.1 MIXED CONNECTIVE TISSUE DISEASE (HCC): ICD-10-CM

## 2022-12-27 DIAGNOSIS — Z00.00 MEDICARE ANNUAL WELLNESS VISIT, SUBSEQUENT: Primary | ICD-10-CM

## 2022-12-27 DIAGNOSIS — C50.919 MALIGNANT NEOPLASM OF FEMALE BREAST, UNSPECIFIED ESTROGEN RECEPTOR STATUS, UNSPECIFIED LATERALITY, UNSPECIFIED SITE OF BREAST (HCC): ICD-10-CM

## 2022-12-27 DIAGNOSIS — N18.31 STAGE 3A CHRONIC KIDNEY DISEASE (HCC): ICD-10-CM

## 2022-12-27 DIAGNOSIS — R29.6 MULTIPLE FALLS: ICD-10-CM

## 2022-12-27 DIAGNOSIS — E55.9 VITAMIN D DEFICIENCY: ICD-10-CM

## 2022-12-27 DIAGNOSIS — E66.9 CLASS 1 OBESITY: ICD-10-CM

## 2022-12-27 DIAGNOSIS — M32.9 LUPUS (HCC): ICD-10-CM

## 2022-12-27 DIAGNOSIS — Z98.890 HISTORY OF ESOPHAGEAL DILATATION: ICD-10-CM

## 2022-12-27 DIAGNOSIS — E78.5 HYPERLIPIDEMIA, UNSPECIFIED HYPERLIPIDEMIA TYPE: ICD-10-CM

## 2022-12-27 NOTE — PATIENT INSTRUCTIONS
Medicare Preventive Visit Patient Instructions  Thank you for completing your Welcome to Medicare Visit or Medicare Annual Wellness Visit today  Your next wellness visit will be due in one year (12/28/2023)  The screening/preventive services that you may require over the next 5-10 years are detailed below  Some tests may not apply to you based off risk factors and/or age  Screening tests ordered at today's visit but not completed yet may show as past due  Also, please note that scanned in results may not display below  Preventive Screenings:  Service Recommendations Previous Testing/Comments   Colorectal Cancer Screening  * Colonoscopy    * Fecal Occult Blood Test (FOBT)/Fecal Immunochemical Test (FIT)  * Fecal DNA/Cologuard Test  * Flexible Sigmoidoscopy Age: 39-70 years old   Colonoscopy: every 10 years (may be performed more frequently if at higher risk)  OR  FOBT/FIT: every 1 year  OR  Cologuard: every 3 years  OR  Sigmoidoscopy: every 5 years  Screening may be recommended earlier than age 39 if at higher risk for colorectal cancer  Also, an individualized decision between you and your healthcare provider will decide whether screening between the ages of 74-80 would be appropriate  Colonoscopy: 08/26/2021  FOBT/FIT: Not on file  Cologuard: Not on file  Sigmoidoscopy: Not on file    Screening Current     Breast Cancer Screening Age: 36 years old  Frequency: every 1-2 years  Not required if history of left and right mastectomy Mammogram: 02/10/2022    History Breast Cancer   Cervical Cancer Screening Between the ages of 21-29, pap smear recommended once every 3 years  Between the ages of 33-67, can perform pap smear with HPV co-testing every 5 years     Recommendations may differ for women with a history of total hysterectomy, cervical cancer, or abnormal pap smears in past  Pap Smear: Not on file    Screening Not Indicated   Hepatitis C Screening Once for adults born between 1945 and 1965  More frequently in patients at high risk for Hepatitis C Hep C Antibody: 03/01/2021    Screening Current   Diabetes Screening 1-2 times per year if you're at risk for diabetes or have pre-diabetes Fasting glucose: 109 mg/dL (12/22/2022)  A1C: 5 8 % (1/22/2021)  Screening Current   Cholesterol Screening Once every 5 years if you don't have a lipid disorder  May order more often based on risk factors  Lipid panel: 12/22/2022    Screening Not Indicated  History Lipid Disorder     Other Preventive Screenings Covered by Medicare:  1  Abdominal Aortic Aneurysm (AAA) Screening: covered once if your at risk  You're considered to be at risk if you have a family history of AAA  2  Lung Cancer Screening: covers low dose CT scan once per year if you meet all of the following conditions: (1) Age 50-69; (2) No signs or symptoms of lung cancer; (3) Current smoker or have quit smoking within the last 15 years; (4) You have a tobacco smoking history of at least 20 pack years (packs per day multiplied by number of years you smoked); (5) You get a written order from a healthcare provider  3  Glaucoma Screening: covered annually if you're considered high risk: (1) You have diabetes OR (2) Family history of glaucoma OR (3)  aged 48 and older OR (3)  American aged 72 and older  3  Osteoporosis Screening: covered every 2 years if you meet one of the following conditions: (1) You're estrogen deficient and at risk for osteoporosis based off medical history and other findings; (2) Have a vertebral abnormality; (3) On glucocorticoid therapy for more than 3 months; (4) Have primary hyperparathyroidism; (5) On osteoporosis medications and need to assess response to drug therapy  · Last bone density test (DXA Scan): 07/13/2021   5  HIV Screening: covered annually if you're between the age of 15-65  Also covered annually if you are younger than 13 and older than 72 with risk factors for HIV infection   For pregnant patients, it is covered up to 3 times per pregnancy  Immunizations:  Immunization Recommendations   Influenza Vaccine Annual influenza vaccination during flu season is recommended for all persons aged >= 6 months who do not have contraindications   Pneumococcal Vaccine   * Pneumococcal conjugate vaccine = PCV13 (Prevnar 13), PCV15 (Vaxneuvance), PCV20 (Prevnar 20)  * Pneumococcal polysaccharide vaccine = PPSV23 (Pneumovax) Adults 25-60 years old: 1-3 doses may be recommended based on certain risk factors  Adults 72 years old: 1-2 doses may be recommended based off what pneumonia vaccine you previously received   Hepatitis B Vaccine 3 dose series if at intermediate or high risk (ex: diabetes, end stage renal disease, liver disease)   Tetanus (Td) Vaccine - COST NOT COVERED BY MEDICARE PART B Following completion of primary series, a booster dose should be given every 10 years to maintain immunity against tetanus  Td may also be given as tetanus wound prophylaxis  Tdap Vaccine - COST NOT COVERED BY MEDICARE PART B Recommended at least once for all adults  For pregnant patients, recommended with each pregnancy  Shingles Vaccine (Shingrix) - COST NOT COVERED BY MEDICARE PART B  2 shot series recommended in those aged 48 and above     Health Maintenance Due:      Topic Date Due   • Breast Cancer Screening: Mammogram  02/10/2023   • Colorectal Cancer Screening  08/26/2031   • Hepatitis C Screening  Completed     Immunizations Due:      Topic Date Due   • Hepatitis B Vaccine (1 of 3 - 3-dose series) Never done   • Pneumococcal Vaccine: 65+ Years (1 - PCV) Never done   • COVID-19 Vaccine (4 - Booster for Arnold Shelling series) 01/04/2022     Advance Directives   What are advance directives? Advance directives are legal documents that state your wishes and plans for medical care  These plans are made ahead of time in case you lose your ability to make decisions for yourself   Advance directives can apply to any medical decision, such as the treatments you want, and if you want to donate organs  What are the types of advance directives? There are many types of advance directives, and each state has rules about how to use them  You may choose a combination of any of the following:  · Living will: This is a written record of the treatment you want  You can also choose which treatments you do not want, which to limit, and which to stop at a certain time  This includes surgery, medicine, IV fluid, and tube feedings  · Durable power of  for healthcare [facility]): This is a written record that states who you want to make healthcare choices for you when you are unable to make them for yourself  This person, called a proxy, is usually a family member or a friend  You may choose more than 1 proxy  · Do not resuscitate (DNR) order:  A DNR order is used in case your heart stops beating or you stop breathing  It is a request not to have certain forms of treatment, such as CPR  A DNR order may be included in other types of advance directives  · Medical directive: This covers the care that you want if you are in a coma, near death, or unable to make decisions for yourself  You can list the treatments you want for each condition  Treatment may include pain medicine, surgery, blood transfusions, dialysis, IV or tube feedings, and a ventilator (breathing machine)  · Values history: This document has questions about your views, beliefs, and how you feel and think about life  This information can help others choose the care that you would choose  Why are advance directives important? An advance directive helps you control your care  Although spoken wishes may be used, it is better to have your wishes written down  Spoken wishes can be misunderstood, or not followed  Treatments may be given even if you do not want them  An advance directive may make it easier for your family to make difficult choices about your care     Fall Prevention    Fall prevention  includes ways to make your home and other areas safer  It also includes ways you can move more carefully to prevent a fall  Health conditions that cause changes in your blood pressure, vision, or muscle strength and coordination may increase your risk for falls  Medicines may also increase your risk for falls if they make you dizzy, weak, or sleepy  Fall prevention tips:   · Stand or sit up slowly  · Use assistive devices as directed  · Wear shoes that fit well and have soles that   · Wear a personal alarm  · Stay active  · Manage your medical conditions  Home Safety Tips:  · Add items to prevent falls in the bathroom  · Keep paths clear  · Install bright lights in your home  · Keep items you use often on shelves within reach  · Paint or place reflective tape on the edges of your stairs  Weight Management   Why it is important to manage your weight:  Being overweight increases your risk of health conditions such as heart disease, high blood pressure, type 2 diabetes, and certain types of cancer  It can also increase your risk for osteoarthritis, sleep apnea, and other respiratory problems  Aim for a slow, steady weight loss  Even a small amount of weight loss can lower your risk of health problems  How to lose weight safely:  A safe and healthy way to lose weight is to eat fewer calories and get regular exercise  You can lose up about 1 pound a week by decreasing the number of calories you eat by 500 calories each day  Healthy meal plan for weight management:  A healthy meal plan includes a variety of foods, contains fewer calories, and helps you stay healthy  A healthy meal plan includes the following:  · Eat whole-grain foods more often  A healthy meal plan should contain fiber  Fiber is the part of grains, fruits, and vegetables that is not broken down by your body  Whole-grain foods are healthy and provide extra fiber in your diet   Some examples of whole-grain foods are whole-wheat breads and pastas, oatmeal, brown rice, and bulgur  · Eat a variety of vegetables every day  Include dark, leafy greens such as spinach, kale, mela greens, and mustard greens  Eat yellow and orange vegetables such as carrots, sweet potatoes, and winter squash  · Eat a variety of fruits every day  Choose fresh or canned fruit (canned in its own juice or light syrup) instead of juice  Fruit juice has very little or no fiber  · Eat low-fat dairy foods  Drink fat-free (skim) milk or 1% milk  Eat fat-free yogurt and low-fat cottage cheese  Try low-fat cheeses such as mozzarella and other reduced-fat cheeses  · Choose meat and other protein foods that are low in fat  Choose beans or other legumes such as split peas or lentils  Choose fish, skinless poultry (chicken or turkey), or lean cuts of red meat (beef or pork)  Before you cook meat or poultry, cut off any visible fat  · Use less fat and oil  Try baking foods instead of frying them  Add less fat, such as margarine, sour cream, regular salad dressing and mayonnaise to foods  Eat fewer high-fat foods  Some examples of high-fat foods include french fries, doughnuts, ice cream, and cakes  · Eat fewer sweets  Limit foods and drinks that are high in sugar  This includes candy, cookies, regular soda, and sweetened drinks  Exercise:  Exercise at least 30 minutes per day on most days of the week  Some examples of exercise include walking, biking, dancing, and swimming  You can also fit in more physical activity by taking the stairs instead of the elevator or parking farther away from stores  Ask your healthcare provider about the best exercise plan for you  © Copyright [employer] 2018 Information is for End User's use only and may not be sold, redistributed or otherwise used for commercial purposes   All illustrations and images included in CareNotes® are the copyrighted property of [employer]  or [employer]

## 2022-12-27 NOTE — PROGRESS NOTES
Assessment and Plan: chest pain chest tightness and shortness of breath with exertion the patient is unable to walk up hills due to severe shortness of breath  The plan is for nuclear medicine stress test     Mixed connective tissue disease and lupus treated by rheumatology the patient is continuing with Plaquenil    Morbid obesity with BMI of 30  4 01 unchanged from last visit    Anxiety depression Treated with Zoloft 25 mg daily    History of malignant neoplasm of the breast currently in remission    History of hyperlipidemia with the lipid panel in a desirable pattern on current therapy    Patient has had colonoscopy in 2021 and is followed by Gastroenterology has a history of esophageal dilatation         Problem List Items Addressed This Visit    None    BMI Counseling: Body mass index is 34 01 kg/m²  The BMI is above normal  Nutrition recommendations include decreasing portion sizes, encouraging healthy choices of fruits and vegetables, decreasing fast food intake, consuming healthier snacks, limiting drinks that contain sugar, moderation in carbohydrate intake, increasing intake of lean protein, reducing intake of saturated and trans fat and reducing intake of cholesterol  Rationale for BMI follow-up plan is due to patient being overweight or obese  Depression Screening and Follow-up Plan: Patient was screened for depression during today's encounter  They screened negative with a PHQ-2 score of 0  Falls Plan of Care: balance, strength, and gait training instructions were provided  Vitamin D supplementation was recommended  Preventive health issues were discussed with patient, and age appropriate screening tests were ordered as noted in patient's After Visit Summary  Personalized health advice and appropriate referrals for health education or preventive services given if needed, as noted in patient's After Visit Summary       History of Present Illness:     Patient presents for a Medicare Wellness Visit     Patient presents for Medicare wellness check     Patient Care Team:  Beti Jo DO as PCP - Jumana Hinds MD as Endoscopist     Review of Systems:     Review of Systems   Constitutional: Negative for chills and fever  HENT: Negative for ear pain and sore throat  Eyes: Negative for pain and visual disturbance  Respiratory: Positive for chest tightness and shortness of breath  Negative for cough  Cardiovascular: Positive for chest pain  Negative for palpitations  Gastrointestinal: Negative for abdominal pain and vomiting  Genitourinary: Negative for dysuria and hematuria  Musculoskeletal: Positive for arthralgias and myalgias  Negative for back pain  Skin: Negative for color change and rash  Neurological: Negative for seizures and syncope  All other systems reviewed and are negative         Problem List:     Patient Active Problem List   Diagnosis   • Change in bowel habits   • Microscopic hematuria   • Mixed connective tissue disease ([facility] [address] )   • Stage 3 chronic kidney disease ([facility] [address] )   • Class 1 obesity   • Ataxia   • Hyperlipidemia   • Malignant neoplasm of female breast, unspecified estrogen receptor status, unspecified laterality, unspecified site of breast [facility])      Past Medical and Surgical History:     Past Medical History:   Diagnosis Date   • Breast cancer ([facility] [address] ) 03/01/2017    right breast intraductal carcinoma performed at [facility]   • History of radiation therapy 08/01/2017    right breast     Past Surgical History:   Procedure Laterality Date   • BREAST BIOPSY Left 1979    benign   • BREAST EXCISIONAL BIOPSY Right 04/2017    intraductal carcinoma needle loc performed at [facility]   • BREAST LUMPECTOMY Right 03/01/2017   • BREAST SURGERY     • CHOLECYSTECTOMY     • COLONOSCOPY     • ESOPHAGOGASTRODUODENOSCOPY     • NC COLONOSCOPY FLX DX W/COLLJ SPEC WHEN PFRMD N/A 10/23/2017    Procedure: COLONOSCOPY;  Surgeon: Hayden Howell MD;  Location: [facility] OR;  Service: Colorectal      Family History:     Family History   Problem Relation Age of Onset   • Breast cancer Sister 64   • Uterine cancer Sister    • BRCA1 Negative Sister    • BRCA2 Negative Sister    • Breast cancer Maternal Grandmother 76   • Breast cancer Cousin 48      Social History:     Social History     Socioeconomic History   • Marital status: /Civil Union     Spouse name: None   • Number of children: None   • Years of education: None   • Highest education level: None   Occupational History   • None   Tobacco Use   • Smoking status: Never   • Smokeless tobacco: Never   Vaping Use   • Vaping Use: Never used   Substance and Sexual Activity   • Alcohol use: Not Currently     Comment: rarely   • Drug use: No   • Sexual activity: None   Other Topics Concern   • None   Social History Narrative   • None     Social Determinants of Health     Financial Resource Strain: Low Risk    • Difficulty of Paying Living Expenses: Not hard at all   Food Insecurity: Not on file   Transportation Needs: No Transportation Needs   • Lack of Transportation (Medical): No   • Lack of Transportation (Non-Medical):  No   Physical Activity: Not on file   Stress: Not on file   Social Connections: Not on file   Intimate Partner Violence: Not on file   Housing Stability: Not on file      Medications and Allergies:     Current Outpatient Medications   Medication Sig Dispense Refill   • azelastine (ASTELIN) 0 1 % nasal spray 1 spray into each nostril daily Use in each nostril as directed     • Calcium Ascorbate 500 MG TABS Take 500 mg by mouth 2 (two) times a day      • co-enzyme Q-10 30 MG capsule Take 30 mg by mouth daily     • colestipol (COLESTID) 1 g tablet Take 1 tablet (1 g total) by mouth daily 90 tablet 2   • DULoxetine (CYMBALTA) 20 mg capsule Take 20 mg by mouth daily     • ezetimibe (ZETIA) 10 mg tablet Take 1 tablet (10 mg total) by mouth daily 90 tablet 3   • famotidine (PEPCID) 40 MG tablet Take 1 tablet (40 mg total) by mouth daily at bedtime 30 tablet 3   • fluocinonide (LIDEX) 0 05 % external solution Apply topically 2 (two) times a day     • fluticasone (FLONASE) 50 mcg/act nasal spray 2 sprays into each nostril daily     • hydroxychloroquine (PLAQUENIL) 200 mg tablet 200 mg 2 (two) times a day with meals   0   • loperamide (IMODIUM) 2 mg/15 mL oral liquid      • loratadine (CLARITIN) 10 mg tablet Take 1 tablet (10 mg total) by mouth daily (Patient taking differently: Take 10 mg by mouth as needed) 30 tablet 0   • Multiple Vitamin (MULTI-VITAMIN DAILY PO) Take 500 mg by mouth daily      • timolol (TIMOPTIC) 0 5 % ophthalmic solution Administer 1 drop to both eyes every 12 (twelve) hours       No current facility-administered medications for this visit  Allergies   Allergen Reactions   • Bactrim [Sulfamethoxazole-Trimethoprim] Anaphylaxis and Swelling     Swelling of throat and nose; itching   • Other      clear tape _blisters    Peaches, potatoes, apples- per allergy testing per pt   • Molds & Smuts    • Pollen Extract Itching   • Wound Dressings Rash      Immunizations:     Immunization History   Administered Date(s) Administered   • COVID-19 MODERNA VACC 0 5 ML IM 03/24/2021, 04/21/2021, 11/09/2021   • INFLUENZA 09/28/2020, 10/31/2022   • Influenza, high dose seasonal 0 7 mL 12/21/2021      Health Maintenance:         Topic Date Due   • Breast Cancer Screening: Mammogram  02/10/2023   • Colorectal Cancer Screening  08/26/2031   • Hepatitis C Screening  Completed         Topic Date Due   • Hepatitis B Vaccine (1 of 3 - 3-dose series) Never done   • Pneumococcal Vaccine: 65+ Years (1 - PCV) Never done   • COVID-19 Vaccine (4 - Booster for Emmanuel Baeza series) 01/04/2022      Medicare Screening Tests and Risk Assessments:         Health Risk Assessment:   Patient rates overall health as fair  Patient feels that their physical health rating is slightly worse  Eyesight was rated as same  Hearing was rated as same   Patient feels that their emotional and mental health rating is same  Patients states they are sometimes angry  Patient states they are always unusually tired/fatigued  Pain experienced in the last 7 days has been a lot  Patient's pain rating has been 8/10  Patient states that she has experienced no weight loss or gain in last 6 months  Depression Screening:   PHQ-2 Score: 0      Fall Risk Screening: In the past year, patient has experienced: history of falling in past year    Number of falls: 1  Injured during fall?: No    Feels unsteady when standing or walking?: No    Worried about falling?: No      Urinary Incontinence Screening:   Patient has not leaked urine accidently in the last six months  Home Safety:  Patient does not have trouble with stairs inside or outside of their home  Patient has working smoke alarms and has working carbon monoxide detector  Home safety hazards include: none  Nutrition:   Current diet is Low Saturated Fat and No Added Salt  Medications:   Patient is currently taking over-the-counter supplements  OTC medications include: see medication list  Patient is able to manage medications  Activities of Daily Living (ADLs)/Instrumental Activities of Daily Living (IADLs):   Walk and transfer into and out of bed and chair?: Yes  Dress and groom yourself?: Yes    Bathe or shower yourself?: Yes    Feed yourself?  Yes  Do your laundry/housekeeping?: Yes  Manage your money, pay your bills and track your expenses?: Yes  Make your own meals?: Yes    Do your own shopping?: Yes    Previous Hospitalizations:   Any hospitalizations or ED visits within the last 12 months?: No      Advance Care Planning:   Living will: No    Durable POA for healthcare: Yes      PREVENTIVE SCREENINGS      Cardiovascular Screening:    General: Screening Not Indicated and History Lipid Disorder      Diabetes Screening:     General: Screening Current      Colorectal Cancer Screening:     General: Screening Current      Breast Cancer Screening:     General: History Breast Cancer      Cervical Cancer Screening:    General: Screening Not Indicated      Lung Cancer Screening:     General: Screening Not Indicated      Hepatitis C Screening:    General: Screening Current    Screening, Brief Intervention, and Referral to Treatment (SBIRT)    Screening  Typical number of drinks in a day: 0  Typical number of drinks in a week: 0  Interpretation: Low risk drinking behavior  Single Item Drug Screening:  How often have you used an illegal drug (including marijuana) or a prescription medication for non-medical reasons in the past year? never    Single Item Drug Screen Score: 0  Interpretation: Negative screen for possible drug use disorder    No results found  Physical Exam:     /74   Pulse 84   Temp 97 7 °F (36 5 °C)   Resp 20   Ht 5' 1" (1 549 m)   Wt 81 6 kg (180 lb)   LMP  (LMP Unknown)   BMI 34 01 kg/m²     Physical Exam  Vitals and nursing note reviewed  Constitutional:       General: She is not in acute distress  Appearance: She is well-developed  HENT:      Head: Normocephalic and atraumatic  Eyes:      Conjunctiva/sclera: Conjunctivae normal    Cardiovascular:      Rate and Rhythm: Normal rate and regular rhythm  Heart sounds: No murmur heard  Comments:  Bilateral costochondral tenderness  Pulmonary:      Effort: Pulmonary effort is normal  No respiratory distress  Breath sounds: Normal breath sounds  Abdominal:      Palpations: Abdomen is soft  Tenderness: There is no abdominal tenderness  Musculoskeletal:         General: No swelling  Cervical back: Neck supple  Skin:     General: Skin is warm and dry  Capillary Refill: Capillary refill takes less than 2 seconds  Neurological:      Mental Status: She is alert     Psychiatric:         Mood and Affect: Mood normal           Manuelita Rise, DO

## 2023-01-05 ENCOUNTER — TELEPHONE (OUTPATIENT)
Dept: NEPHROLOGY | Facility: CLINIC | Age: 68
End: 2023-01-05

## 2023-01-05 NOTE — TELEPHONE ENCOUNTER
Patient called stating she is having urinary retention and hasn't urinated since last night  Patient also stated she is not drinking enough fluids  I scheduled her for a follow up in [place] tomorrow

## 2023-01-05 NOTE — TELEPHONE ENCOUNTER
If she truly cannot urinate she will need to present to ER for further evaluation   Please also advise her to call her urologist  Previously seen by Марина Strong

## 2023-01-06 ENCOUNTER — OFFICE VISIT (OUTPATIENT)
Dept: NEPHROLOGY | Facility: CLINIC | Age: 68
End: 2023-01-06

## 2023-01-06 VITALS
DIASTOLIC BLOOD PRESSURE: 76 MMHG | BODY MASS INDEX: 33.79 KG/M2 | SYSTOLIC BLOOD PRESSURE: 124 MMHG | HEART RATE: 87 BPM | HEIGHT: 61 IN | WEIGHT: 179 LBS | OXYGEN SATURATION: 98 %

## 2023-01-06 DIAGNOSIS — E55.9 VITAMIN D DEFICIENCY: ICD-10-CM

## 2023-01-06 DIAGNOSIS — M35.1 MIXED CONNECTIVE TISSUE DISEASE (HCC): Primary | ICD-10-CM

## 2023-01-06 DIAGNOSIS — N18.31 STAGE 3A CHRONIC KIDNEY DISEASE (HCC): ICD-10-CM

## 2023-01-06 DIAGNOSIS — R39.198 DIFFICULTY URINATING: Primary | ICD-10-CM

## 2023-01-06 DIAGNOSIS — M32.9 LUPUS (HCC): ICD-10-CM

## 2023-01-06 PROBLEM — IMO0002 LUPUS: Status: ACTIVE | Noted: 2023-01-06

## 2023-01-06 RX ORDER — DULOXETIN HYDROCHLORIDE 20 MG/1
20 CAPSULE, DELAYED RELEASE ORAL DAILY
Qty: 90 CAPSULE | Refills: 1 | Status: SHIPPED | OUTPATIENT
Start: 2023-01-06

## 2023-01-06 RX ORDER — DULOXETIN HYDROCHLORIDE 30 MG/1
30 CAPSULE, DELAYED RELEASE ORAL DAILY
Qty: 30 CAPSULE | Refills: 3 | OUTPATIENT
Start: 2023-01-06

## 2023-01-06 NOTE — PATIENT INSTRUCTIONS
Start taking vitamin d 2,000 units per day (over the counter)  Get urine studies  Get ultrasound done  Try to drink 64 ounces per day

## 2023-01-06 NOTE — PROGRESS NOTES
Nephrology   Office Follow-Up  Vinicio Chen 75 y o  female MRN: [id number]    Encounter: [id number]        112 Wendie Roman was seen in the [place] office today  All diagnoses and orders for visit:     1  Difficulty urinating  · Drinks 30 ounces per day  Urine is dark, turbid and foul smelling  Has back pain but fell in October  Get imaging and UA now  Drink 64 ounces per day  -     UA w Reflex to Microscopic w Reflex to Culture   -     US kidney and bladder with pvr; Future; Expected date: 01/06/2023   -     Microalbumin / creatinine urine ratio   -     Protein / creatinine ratio, urine  2  Stage 3a chronic kidney disease (HCC)  · Baseline creatinine around 0 8-1 1 mg/dL dating back to 2018  No history of lupus nephritis  Urine bland  Complements normal    -     CBC and differential; Future; Expected date: 06/05/2023  -     Comprehensive metabolic panel; Future; Expected date: 06/05/2023  -     Microalbumin / creatinine urine ratio; Future; Expected date: 06/05/2023  -     Urinalysis with microscopic; Future; Expected date: 06/05/2023  -     Protein / creatinine ratio, urine; Future; Expected date: 06/05/2023  3  Vitamin D deficiency  · Start OTC vitamin D 2000 units per day   -     Vitamin D 25 hydroxy; Future; Expected date: 06/05/2023  4  Lupus ([address] )  · On Plaquenil per Rheumatologist    HPI: Vinicio Chen is a 75 y o  female who is here for concern of decreased urine production and difficulty urinating  She follows with Dr Pavel Rojo for CKD 3a  Admits to not eating or drinking well  Will increase hydration to 64 ounces per day  Get ultrasound and UA with culture to reflex now  Repeat neph labs in 6 months and then return with Dr Pavel Rojo  Complements followed by Rheum  ROS:   Review of Systems   Constitutional: Positive for activity change and fatigue  Negative for chills and fever  HENT: Negative for ear pain and sore throat  Eyes: Negative for pain and visual disturbance  Respiratory: Negative for cough and shortness of breath  Cardiovascular: Positive for leg swelling  Negative for chest pain and palpitations  Gastrointestinal: Negative for abdominal pain and vomiting  Genitourinary: Positive for decreased urine volume and difficulty urinating  Negative for dysuria and hematuria  Musculoskeletal: Positive for arthralgias, back pain, gait problem, joint swelling and myalgias  Skin: Negative for color change and rash  Neurological: Positive for weakness  Negative for seizures and syncope  All other systems reviewed and are negative        Allergies: Bactrim [sulfamethoxazole-trimethoprim], Other, Molds & smuts, Pollen extract, and Wound dressings    Medications:   Current Outpatient Medications:   •  azelastine (ASTELIN) 0 1 % nasal spray, 1 spray into each nostril daily Use in each nostril as directed, Disp: , Rfl:   •  Calcium Ascorbate 500 MG TABS, Take 500 mg by mouth 2 (two) times a day , Disp: , Rfl:   •  co-enzyme Q-10 30 MG capsule, Take 30 mg by mouth daily, Disp: , Rfl:   •  DULoxetine (CYMBALTA) 20 mg capsule, Take 20 mg by mouth daily Patient states bottle says 30mg, Disp: , Rfl:   •  ezetimibe (ZETIA) 10 mg tablet, Take 1 tablet (10 mg total) by mouth daily, Disp: 90 tablet, Rfl: 3  •  fluocinonide (LIDEX) 0 05 % external solution, Apply topically 2 (two) times a day, Disp: , Rfl:   •  fluticasone (FLONASE) 50 mcg/act nasal spray, 2 sprays into each nostril daily, Disp: , Rfl:   •  hydroxychloroquine (PLAQUENIL) 200 mg tablet, 200 mg 2 (two) times a day with meals , Disp: , Rfl: 0  •  loperamide (IMODIUM) 2 mg/15 mL oral liquid, , Disp: , Rfl:   •  Multiple Vitamin (MULTI-VITAMIN DAILY PO), Take 500 mg by mouth daily , Disp: , Rfl:   •  timolol (TIMOPTIC) 0 5 % ophthalmic solution, Administer 1 drop to both eyes every 12 (twelve) hours, Disp: , Rfl:   •  colestipol (COLESTID) 1 g tablet, Take 1 tablet (1 g total) by mouth daily (Patient not taking: Reported on 1/6/2023), Disp: 90 tablet, Rfl: 2  •  loratadine (CLARITIN) 10 mg tablet, Take 1 tablet (10 mg total) by mouth daily (Patient not taking: Reported on 1/6/2023), Disp: 30 tablet, Rfl: 0    Past Medical History:   Diagnosis Date   • Breast cancer ([address] ) 03/01/2017    right breast intraductal carcinoma performed at [facility]   • History of radiation therapy 08/01/2017    right breast     Past Surgical History:   Procedure Laterality Date   • BREAST BIOPSY Left 1979    benign   • BREAST EXCISIONAL BIOPSY Right 04/2017    intraductal carcinoma needle loc performed at [facility]   • BREAST LUMPECTOMY Right 03/01/2017   • BREAST SURGERY     • CHOLECYSTECTOMY     • COLONOSCOPY     • ESOPHAGOGASTRODUODENOSCOPY     • [place] COLONOSCOPY FLX DX W/COLLJ SPEC WHEN PFRMD N/A 10/23/2017    Procedure: COLONOSCOPY;  Surgeon: Kathie Carr MD;  Location: [facility];  Service: Colorectal     Family History   Problem Relation Age of Onset   • Breast cancer Sister 64   • Uterine cancer Sister    • BRCA1 Negative Sister    • BRCA2 Negative Sister    • Breast cancer Maternal Grandmother 76   • Breast cancer Cousin 50      reports that she has never smoked  She has never used smokeless tobacco  She reports that she does not currently use alcohol  She reports that she does not use drugs  Physical Exam:   Vitals:    01/06/23 1106   BP: 124/76   BP Location: Left arm   Patient Position: Sitting   Cuff Size: Standard   Pulse: 87   SpO2: 98%   Weight: 81 2 kg (179 lb)   Height: 5' 1" (1 549 m)     Body mass index is 33 82 kg/m²      General: conscious, cooperative, in no acute distress, appears stated age  Eyes: conjunctivae pale, anicteric sclerae  ENT: lips and mucous membranes moist  Neck: supple, no JVD, no masses  Chest:  essentially clear breath sounds bilaterally, no crackles, ronchus or wheezings  CVS: S1 & S2, normal rate, regular rhythm  Abdomen: soft, non-tender, non-distended, normoactive bowel sounds, rounded  Extremities: no edema of both legs  Skin: no rash   Neuro: awake, alert, oriented       Diagnostic Data:  Lab: I have personally reviewed pertinent lab results  ,   CBC:       CMP: No results found for: SODIUM, K, CL, CO2, ANIONGAP, BUN, CREATININE, GLUCOSE, CALCIUM, AST, ALT, ALKPHOS, PROT, BILITOT, EGFR,   PT/INR: No results found for: PT, INR,   Magnesium: No components found for: MAG,  Phosphorous: No results found for: PHOS    Patient Instructions   Start taking vitamin d 2,000 units per day (over the counter)  Get urine studies  Get ultrasound done  Try to drink 64 ounces per day      Portions of the record may have been created with voice recognition software  Occasional wrong word or "sound a like" substitutions may have occurred due to the inherent limitations of voice recognition software  Read the chart carefully and recognize, using context, where substitutions have occurred  If you have any questions, please contact the dictating provider

## 2023-01-08 ENCOUNTER — APPOINTMENT (OUTPATIENT)
Dept: LAB | Facility: HOSPITAL | Age: 68
End: 2023-01-08

## 2023-01-08 LAB
BILIRUB UR QL STRIP: NEGATIVE
CLARITY UR: CLEAR
COLOR UR: YELLOW
CREAT UR-MCNC: 116 MG/DL
CREAT UR-MCNC: 116 MG/DL
GLUCOSE UR STRIP-MCNC: NEGATIVE MG/DL
HGB UR QL STRIP.AUTO: NEGATIVE
KETONES UR STRIP-MCNC: NEGATIVE MG/DL
LEUKOCYTE ESTERASE UR QL STRIP: NEGATIVE
MICROALBUMIN UR-MCNC: 7.1 MG/L (ref 0–20)
MICROALBUMIN/CREAT 24H UR: 6 MG/G CREATININE (ref 0–30)
NITRITE UR QL STRIP: NEGATIVE
PH UR STRIP.AUTO: 6 [PH]
PROT UR STRIP-MCNC: NEGATIVE MG/DL
PROT UR-MCNC: 28 MG/DL
PROT/CREAT UR: 0.24 MG/G{CREAT} (ref 0–0.1)
SP GR UR STRIP.AUTO: >=1.03 (ref 1–1.03)
UROBILINOGEN UR QL STRIP.AUTO: 0.2 E.U./DL

## 2023-01-13 ENCOUNTER — HOSPITAL ENCOUNTER (OUTPATIENT)
Dept: NUCLEAR MEDICINE | Facility: HOSPITAL | Age: 68
Discharge: HOME | End: 2023-01-13
Attending: FAMILY MEDICINE

## 2023-01-17 ENCOUNTER — TELEPHONE (OUTPATIENT)
Dept: NEPHROLOGY | Facility: CLINIC | Age: 68
End: 2023-01-17

## 2023-01-17 NOTE — TELEPHONE ENCOUNTER
----- Message from Shante Sky sent at 1/9/2023  3:38 PM EST -----  Urine was concentrated but NEGATIVE for infection  Encourage hydration 64 ounces per day   Await ultrasound

## 2023-01-17 NOTE — TELEPHONE ENCOUNTER
Called patient and went over the following information:    Urine was concentrated but NEGATIVE for infection  Encourage hydration 64 ounces per day  Awaiting ultrasound  Patient verbally understood and had no further questions for me at this time  Patient states she will be getting the ultrasound done soon

## 2023-01-28 DIAGNOSIS — E78.5 HYPERLIPIDEMIA, UNSPECIFIED HYPERLIPIDEMIA TYPE: ICD-10-CM

## 2023-01-29 RX ORDER — EZETIMIBE 10 MG/1
TABLET ORAL
Qty: 90 TABLET | Refills: 3 | Status: SHIPPED | OUTPATIENT
Start: 2023-01-29

## 2023-03-02 ENCOUNTER — HOSPITAL ENCOUNTER (EMERGENCY)
Facility: HOSPITAL | Age: 68
Discharge: HOME | End: 2023-03-02
Attending: EMERGENCY MEDICINE

## 2023-03-02 ENCOUNTER — APPOINTMENT (EMERGENCY)
Dept: CT IMAGING | Facility: HOSPITAL | Age: 68
End: 2023-03-02

## 2023-03-02 VITALS
DIASTOLIC BLOOD PRESSURE: 78 MMHG | RESPIRATION RATE: 19 BRPM | HEART RATE: 87 BPM | SYSTOLIC BLOOD PRESSURE: 188 MMHG | TEMPERATURE: 98 F | OXYGEN SATURATION: 97 %

## 2023-03-02 DIAGNOSIS — R91.1 PULMONARY NODULE: Primary | ICD-10-CM

## 2023-03-02 DIAGNOSIS — S32.020A COMPRESSION FRACTURE OF L2 (HCC): ICD-10-CM

## 2023-03-02 RX ORDER — NAPROXEN 500 MG/1
500 TABLET ORAL 2 TIMES DAILY WITH MEALS
Qty: 30 TABLET | Refills: 0 | Status: SHIPPED | OUTPATIENT
Start: 2023-03-02 | End: 2023-03-10 | Stop reason: SDUPTHER

## 2023-03-02 RX ORDER — HYDROCODONE BITARTRATE AND ACETAMINOPHEN 5; 325 MG/1; MG/1
1 TABLET ORAL EVERY 6 HOURS PRN
Qty: 12 TABLET | Refills: 0 | Status: SHIPPED | OUTPATIENT
Start: 2023-03-02 | End: 2023-03-10 | Stop reason: SDUPTHER

## 2023-03-02 RX ORDER — CYCLOBENZAPRINE HCL 10 MG
10 TABLET ORAL ONCE
Status: COMPLETED | OUTPATIENT
Start: 2023-03-02 | End: 2023-03-02

## 2023-03-02 RX ADMIN — CYCLOBENZAPRINE HYDROCHLORIDE 10 MG: 10 TABLET, FILM COATED ORAL at 13:30

## 2023-03-02 NOTE — ED PROVIDER NOTES
History  Chief Complaint   Patient presents with   • Back Pain     Pt tripped on porch on Tuesday and is here today for increased pain  She reports having felt better yesterday but today c/o worsening low back pain  No thinners and no LOC     This is a 26-year-old pleasant female presenting with her  for evaluation of mid and low back pain  Past medical history significant for systemic lupus  She has had back pain in the past however she has had 2 falls on Tuesday of this week  The seem to have resulted when she was unable to fully lift the right leg in the air causing a trip and fall she states she fell backwards  She states she did strike the back of her head off of the ground she describes it as a scrape  She does not take anticoagulants  This was reviewed with her medication list on file  She has had some incontinence of urine and stool, she has had stool incontinence for a few years  History of colitis  She is found to have contusion left lateral forearm however normal range of motion  She has self treated at home with acetaminophen  Here for evaluation of back pain worsening range of motion hard time getting around over the last few days  Will complete CT scan to evaluate for bony or disc abnormality  Prior to Admission Medications   Prescriptions Last Dose Informant Patient Reported? Taking?    Calcium Ascorbate 500 MG TABS   Yes No   Sig: Take 500 mg by mouth 2 (two) times a day    DULoxetine (CYMBALTA) 20 mg capsule   No No   Sig: Take 1 capsule (20 mg total) by mouth daily Patient states bottle says 30mg   Multiple Vitamin (MULTI-VITAMIN DAILY PO)   Yes No   Sig: Take 500 mg by mouth daily    azelastine (ASTELIN) 0 1 % nasal spray   Yes No   Si spray into each nostril daily Use in each nostril as directed   co-enzyme Q-10 30 MG capsule   Yes No   Sig: Take 30 mg by mouth daily   colestipol (COLESTID) 1 g tablet   No No   Sig: Take 1 tablet (1 g total) by mouth daily   Patient not taking: Reported on 2023   ezetimibe (ZETIA) 10 mg tablet   No No   Sig: take 1 tablet by mouth once daily   fluocinonide (LIDEX) 0 05 % external solution   Yes No   Sig: Apply topically 2 (two) times a day   fluticasone (FLONASE) 50 mcg/act nasal spray   Yes No   Si sprays into each nostril daily   hydroxychloroquine (PLAQUENIL) 200 mg tablet   Yes No   Si mg 2 (two) times a day with meals    loperamide (IMODIUM) 2 mg/15 mL oral liquid   Yes No   loratadine (CLARITIN) 10 mg tablet   No No   Sig: Take 1 tablet (10 mg total) by mouth daily   Patient not taking: Reported on 2023   timolol (TIMOPTIC) 0 5 % ophthalmic solution   Yes No   Sig: Administer 1 drop to both eyes every 12 (twelve) hours      Facility-Administered Medications: None       Past Medical History:   Diagnosis Date   • Breast cancer ([facility] [address] ) 2017    right breast intraductal carcinoma performed at [facility]   • History of radiation therapy 2017    right breast       Past Surgical History:   Procedure Laterality Date   • BREAST BIOPSY Left     benign   • BREAST EXCISIONAL BIOPSY Right 2017    intraductal carcinoma needle loc performed at [facility]   • BREAST LUMPECTOMY Right 2017   • BREAST SURGERY     • CHOLECYSTECTOMY     • COLONOSCOPY     • ESOPHAGOGASTRODUODENOSCOPY     • [place] COLONOSCOPY FLX DX W/COLLJ SPEC WHEN PFRMD N/A 10/23/2017    Procedure: COLONOSCOPY;  Surgeon: Kvng Rivera MD;  Location: [facility];  Service: Colorectal       Family History   Problem Relation Age of Onset   • Breast cancer Sister 64   • Uterine cancer Sister    • BRCA1 Negative Sister    • BRCA2 Negative Sister    • Breast cancer Maternal Grandmother 76   • Breast cancer Cousin 48     I have reviewed and agree with the history as documented      E-Cigarette/Vaping   • E-Cigarette Use Never User      E-Cigarette/Vaping Substances     Social History     Tobacco Use   • Smoking status: Never   • Smokeless tobacco: Never   Vaping Use   • Vaping Use: Never used   Substance Use Topics   • Alcohol use: Not Currently     Comment: rarely   • Drug use: No       Review of Systems   Constitutional: Negative for chills and fever  HENT: Negative for ear pain and sore throat  Eyes: Negative for pain and visual disturbance  Respiratory: Negative for cough and shortness of breath  Cardiovascular: Negative for chest pain and palpitations  Gastrointestinal: Negative for abdominal pain and vomiting  Genitourinary: Negative for dysuria and hematuria  Musculoskeletal: Positive for back pain  Negative for arthralgias  Skin: Negative for color change and rash  Neurological: Negative for seizures and syncope  All other systems reviewed and are negative  Physical Exam  Physical Exam  Vitals reviewed  Constitutional:       General: She is not in acute distress  Appearance: She is well-developed  She is not ill-appearing or diaphoretic  HENT:      Right Ear: External ear normal  No swelling  Tympanic membrane is not bulging  Left Ear: External ear normal  No swelling  Tympanic membrane is not bulging  Nose: Nose normal       Mouth/Throat:      Pharynx: No oropharyngeal exudate  Eyes:      General: Lids are normal       Conjunctiva/sclera: Conjunctivae normal       Pupils: Pupils are equal, round, and reactive to light  Neck:      Thyroid: No thyromegaly  Vascular: No JVD  Trachea: No tracheal deviation  Cardiovascular:      Rate and Rhythm: Normal rate and regular rhythm  Pulses: Normal pulses  Heart sounds: Normal heart sounds  No murmur heard  No friction rub  No gallop  Pulmonary:      Effort: Pulmonary effort is normal  No respiratory distress  Breath sounds: Normal breath sounds  No stridor  No wheezing, rhonchi or rales  Chest:      Chest wall: No tenderness  Abdominal:      General: Bowel sounds are normal  There is no distension  Palpations: Abdomen is soft  There is no mass  Tenderness: There is no abdominal tenderness  There is no guarding or rebound  Negative signs include Dc's sign  Hernia: No hernia is present  Musculoskeletal:         General: Tenderness present  Normal range of motion  Cervical back: Normal range of motion and neck supple  No edema  Normal range of motion  Comments: Patient exhibits tenderness to palpation essentially from the mid thoracic spine inferiorly into the sacrum  I do not see evidence of contusion abrasion laceration swelling or step-offs on physical examination however  She has nontender contusion left forearm  Full range of motion  Lymphadenopathy:      Cervical: No cervical adenopathy  Skin:     General: Skin is warm and dry  Capillary Refill: Capillary refill takes less than 2 seconds  Coloration: Skin is not pale  Findings: No erythema or rash  Neurological:      Mental Status: She is alert and oriented to person, place, and time  GCS: GCS eye subscore is 4  GCS verbal subscore is 5  GCS motor subscore is 6  Cranial Nerves: No cranial nerve deficit  Sensory: No sensory deficit  Deep Tendon Reflexes: Reflexes are normal and symmetric  Psychiatric:         Mood and Affect: Mood normal          Speech: Speech normal          Behavior: Behavior normal          Thought Content:  Thought content normal          Judgment: Judgment normal          Vital Signs  ED Triage Vitals [03/02/23 1134]   Temperature Pulse Respirations Blood Pressure SpO2   98 °F (36 7 °C) 87 19 (!) 188/78 97 %      Temp Source Heart Rate Source Patient Position - Orthostatic VS BP Location FiO2 (%)   Temporal Monitor Lying Left arm --      Pain Score       10 - Worst Possible Pain           Vitals:    03/02/23 1134   BP: (!) 188/78   Pulse: 87   Patient Position - Orthostatic VS: Lying         Visual Acuity      ED Medications  Medications   cyclobenzaprine (FLEXERIL) tablet 10 mg (10 mg Oral Given 3/2/23 1330) Diagnostic Studies  Results Reviewed     None                 CT spine thoracic & lumbar wo contrast   Final Result by Brandan Barrera MD (03/02 1412)      Acute L2 superior endplate compression fracture with mild height loss  Partially imaged 1 0 cm right lower lobe subpleural pulmonary nodule with respiratory motion artifact, indeterminate  Recommend dedicated CT chest without contrast for further evaluation  Additional chronic/incidental findings as detailed above  The study was marked in [facility] for immediate notification  Workstation performed: [id number]                    Procedures  Procedures         ED Course  ED Course as of 03/02/23 1516   Thu Mar 02, 2023   1202 SpO2: 97 %   1202 Respirations: 19   1202 Pulse: 87   1202 Temp Source: Temporal   1202 Temperature: 98 °F (36 7 °C)   1202 Blood Pressure(!): 188/78  Vs reviewed, hypertensive   1333 Awaiting ct interp   1457 Acute L2 superior endplate compression fracture with mild height loss      Partially imaged 1 0 cm right lower lobe subpleural pulmonary nodule with respiratory motion artifact, indeterminate  Recommend dedicated CT chest without contrast for further evaluation      Additional chronic/incidental findings as detailed above  Medical Decision Making  68-year-old female with systemic lupus here for evaluation of fall x2 which occurred 2 days ago  Self treating with acetaminophen  Has midline thoracic and lumbar tenderness  Will evaluate CT scan  She has chronic right lower extremity weakness secondary to her lupus that has been ongoing for many years and she has had incontinence of stool secondary to her lupus over the last number of years as well        CT scan notes incompletely imaged pulmonary nodule, I spoke with  as well as patient at bedside regarding the need for outpatient noncontrast CT scan of the chest with 3 ordered by primary care they verbalized understanding  She also was noted to have an L2 endplate superior compression fracture, will place TLSO brace follow-up with comprehensive spine which I did place an order for  Also will treat with analgesics at home  Please refer to the History of Present Illness, Diagnostic Study,  ED Course sections of this note for further details on the medical decision making process  Compression fracture of L2 ([facility] [address] ): acute illness or injury  Pulmonary nodule: acute illness or injury  Amount and/or Complexity of Data Reviewed  Radiology: ordered  Risk  Prescription drug management  Disposition  Final diagnoses:   Pulmonary nodule   Compression fracture of L2 ([address] )     Time reflects when diagnosis was documented in both MDM as applicable and the Disposition within this note     Time User Action Codes Description Comment    3/2/2023  2:57 PM Ad MURRAY Add [R91 1] Pulmonary nodule     3/2/2023  2:58 PM Ad MURRAY Add [N23 035P] Compression fracture of L2 [facility])       ED Disposition     ED Disposition   Discharge    Condition   Stable    Date/Time   Thu Mar 2, 2023  3:08 PM    Comment   Luis Armando Spicer discharge to home/self care                 Follow-up Information     Follow up With Specialties Details Why [address] [facility], DO Family Medicine Schedule an appointment as soon as possible for a visit  to schedule your non contrast CT chest scan to evaluate the pulmonary nodule [address] [facility] [address]  [address] [place] [address]  [phone]            Patient's Medications   Discharge Prescriptions    HYDROCODONE-ACETAMINOPHEN (NORCO) 5-325 MG PER TABLET    Take 1 tablet by mouth every 6 (six) hours as needed for pain for up to 10 days Max Daily Amount: 4 tablets       Start Date: 3/2/2023  End Date: 3/12/2023       Order Dose: 1 tablet       Quantity: 12 tablet    Refills: 0    NAPROXEN (NAPROSYN) 500 MG TABLET    Take 1 tablet (500 mg total) by mouth 2 (two) times a day with meals       Start Date: 3/2/2023  End Date: --       Order Dose: 500 mg       Quantity: 30 tablet    Refills: 0           PDMP Review     None          ED Provider  Electronically Signed by           Sarah Aguirre PA-C  03/02/23 2862

## 2023-03-07 ENCOUNTER — TELEPHONE (OUTPATIENT)
Dept: PHYSICAL THERAPY | Facility: OTHER | Age: 68
End: 2023-03-07

## 2023-03-09 ENCOUNTER — NURSE TRIAGE (OUTPATIENT)
Dept: PHYSICAL THERAPY | Facility: OTHER | Age: 68
End: 2023-03-09

## 2023-03-09 DIAGNOSIS — M54.41 ACUTE RIGHT-SIDED LOW BACK PAIN WITH RIGHT-SIDED SCIATICA: ICD-10-CM

## 2023-03-09 DIAGNOSIS — S32.020A COMPRESSION FRACTURE OF L2 VERTEBRA, INITIAL ENCOUNTER (HCC): Primary | ICD-10-CM

## 2023-03-09 NOTE — TELEPHONE ENCOUNTER
Additional Information  • Negative: Is this related to a work injury? • Negative: Is this related to an MVA? • Negative: Are you currently recieving homecare services? Background - Initial Assessment  Clinical complaint: Pain is right low back, radiates down right leg to knee  Has on and off numbness in right thigh  Stated she fell at her home on 2/28/23  Went to ED 3/2/23  Per patient has new CF  Patient also complains of muscle spasms     Date of onset: 2/28/23  Frequency of pain: constant  Quality of pain: sharp    Protocols used: [facility] COMPREHENSIVE SPINE PROGRAM PROTOCOL

## 2023-03-09 NOTE — TELEPHONE ENCOUNTER
Additional Information  • Negative: Has the patient had unexplained weight loss? • Negative: Does the patient have a fever? • Negative: Is the patient experiencing urine retention? • Negative: Is the patient experiencing blood in sputum? • Negative: Is the patient experiencing acute drop foot or paralysis? • Affirmative: Has the patient experienced major trauma? (fall from height, high speed collision, direct blow to spine) and is also experiencing nausea, light-headedness, or loss of consciousness? States she had a fall from standing position x 2 on 2/28  • Negative: Is this a chronic condition? Protocols used: [facility] COMPREHENSIVE SPINE PROGRAM PROTOCOL    CT scan showed: Acute L2 superior endplate compression fracture with mild height loss  This RN did review in detail the Comprehensive Spine Program and what we can provide for their back pain  Patient is agreeable to being triaged by this RN and will proceed with a referral to Orthopedic Surgery  Patient made aware that the Orthopedic Surgery office  will be calling to discuss the appointment  No further questions and/or concerns were voiced by the patient at this time  Patient states understanding of the referral that was placed  Referral Closed

## 2023-03-10 ENCOUNTER — TELEPHONE (OUTPATIENT)
Dept: FAMILY MEDICINE CLINIC | Facility: CLINIC | Age: 68
End: 2023-03-10

## 2023-03-10 DIAGNOSIS — S32.020A COMPRESSION FRACTURE OF L2 VERTEBRA, INITIAL ENCOUNTER (HCC): ICD-10-CM

## 2023-03-10 RX ORDER — NAPROXEN 500 MG/1
500 TABLET ORAL 2 TIMES DAILY WITH MEALS
Qty: 30 TABLET | Refills: 0 | Status: SHIPPED | OUTPATIENT
Start: 2023-03-10

## 2023-03-10 RX ORDER — HYDROCODONE BITARTRATE AND ACETAMINOPHEN 5; 325 MG/1; MG/1
1 TABLET ORAL EVERY 6 HOURS PRN
Qty: 12 TABLET | Refills: 0 | Status: SHIPPED | OUTPATIENT
Start: 2023-03-10 | End: 2023-03-20

## 2023-03-10 NOTE — TELEPHONE ENCOUNTER
Hi, this is Ann-Marie Higgins calling my [birth year] at 17  I thought if I could get a refill on the medications I was given through the at the last Thursday the Naproxen, naproxen and hydrocodone acetaminophen because I don't see a doctor till March 20, March 21st  So let me know what you can do  Thanks  Bye bye

## 2023-03-10 NOTE — TELEPHONE ENCOUNTER
Patient is requesting a refill of Norco and Naprosyn - was seen in ER 03/02/2023 - compression Fx - has an Ortho apt on 03/21/2023 - request meds to hold her over

## 2023-03-21 ENCOUNTER — OFFICE VISIT (OUTPATIENT)
Dept: OBGYN CLINIC | Facility: CLINIC | Age: 68
End: 2023-03-21

## 2023-03-21 VITALS
SYSTOLIC BLOOD PRESSURE: 118 MMHG | BODY MASS INDEX: 32.28 KG/M2 | WEIGHT: 171 LBS | DIASTOLIC BLOOD PRESSURE: 78 MMHG | HEIGHT: 61 IN

## 2023-03-21 DIAGNOSIS — S32.020A COMPRESSION FRACTURE OF L2 VERTEBRA, INITIAL ENCOUNTER (HCC): ICD-10-CM

## 2023-03-21 DIAGNOSIS — S32.020A COMPRESSION FRACTURE OF L2 LUMBAR VERTEBRA, CLOSED, INITIAL ENCOUNTER (HCC): Primary | ICD-10-CM

## 2023-03-21 DIAGNOSIS — M85.80 OSTEOPENIA, UNSPECIFIED LOCATION: ICD-10-CM

## 2023-03-21 DIAGNOSIS — M54.41 ACUTE RIGHT-SIDED LOW BACK PAIN WITH RIGHT-SIDED SCIATICA: ICD-10-CM

## 2023-03-21 NOTE — PROGRESS NOTES
[facility] ORTHOPEDIC SPINE SURGERY  Bhavik Ochoa MD  [address] [facility] [address]  [phone]    HISTORY OF PRESENT ILLNESS:    Gregory Riley is a 75 y o  female who presents for initial evaluation of lumbar spine  Patient was seen in the ED on 3/02/2023 after having a fall two days prior  CT of thoracic and lumbar spine was done at that time that showed acute L2 compression fracture  Patient was given a TLSO brace at that time  Patient reports she continues to have low back pain worse on the right side of low back  Pain radiates down right lower extremity in anterior thigh ending above the knee  Pain worsens with sitting  Patient states pain is currently 8/10  Patient has been wearing TLSO brace as directed  Patient is ambulating with use of cane  Patient reports she had a DEXA scan in the past and was on medication for osteoporosis, but is not currently taking any medication           ALLERGIES:   Allergies   Allergen Reactions   • Bactrim [Sulfamethoxazole-Trimethoprim] Anaphylaxis and Swelling     Swelling of throat and nose; itching   • Other      clear tape _blisters    Peaches, potatoes, apples- per allergy testing per pt   • Molds & Smuts    • Pollen Extract Itching   • Wound Dressings Rash       MEDICATIONS:    Current Outpatient Medications:   •  azelastine (ASTELIN) 0 1 % nasal spray, 1 spray into each nostril daily Use in each nostril as directed, Disp: , Rfl:   •  Calcium Ascorbate 500 MG TABS, Take 500 mg by mouth 2 (two) times a day , Disp: , Rfl:   •  co-enzyme Q-10 30 MG capsule, Take 30 mg by mouth daily, Disp: , Rfl:   •  colestipol (COLESTID) 1 g tablet, Take 1 tablet (1 g total) by mouth daily (Patient not taking: Reported on 1/6/2023), Disp: 90 tablet, Rfl: 2  •  DULoxetine (CYMBALTA) 20 mg capsule, Take 1 capsule (20 mg total) by mouth daily Patient states bottle says 30mg, Disp: 90 capsule, Rfl: 1  •  ezetimibe (ZETIA) 10 mg tablet, take 1 tablet by mouth once daily, Disp: 90 tablet, Rfl: 3  •  fluocinonide (LIDEX) 0 05 % external solution, Apply topically 2 (two) times a day, Disp: , Rfl:   •  fluticasone (FLONASE) 50 mcg/act nasal spray, 2 sprays into each nostril daily, Disp: , Rfl:   •  hydroxychloroquine (PLAQUENIL) 200 mg tablet, 200 mg 2 (two) times a day with meals , Disp: , Rfl: 0  •  loperamide (IMODIUM) 2 mg/15 mL oral liquid, , Disp: , Rfl:   •  loratadine (CLARITIN) 10 mg tablet, Take 1 tablet (10 mg total) by mouth daily (Patient not taking: Reported on 1/6/2023), Disp: 30 tablet, Rfl: 0  •  Multiple Vitamin (MULTI-VITAMIN DAILY PO), Take 500 mg by mouth daily , Disp: , Rfl:   •  naproxen (Naprosyn) 500 mg tablet, Take 1 tablet (500 mg total) by mouth 2 (two) times a day with meals, Disp: 30 tablet, Rfl: 0  •  timolol (TIMOPTIC) 0 5 % ophthalmic solution, Administer 1 drop to both eyes every 12 (twelve) hours, Disp: , Rfl:      PAST MEDICAL HISTORY:   Past Medical History:   Diagnosis Date   • Breast cancer ([address] ) 03/01/2017    right breast intraductal carcinoma performed at [facility]   • History of radiation therapy 08/01/2017    right breast       PAST SURGICAL HISTORY:  Past Surgical History:   Procedure Laterality Date   • BREAST BIOPSY Left 1979    benign   • BREAST EXCISIONAL BIOPSY Right 04/2017    intraductal carcinoma needle loc performed at [facility]   • BREAST LUMPECTOMY Right 03/01/2017   • BREAST SURGERY     • CHOLECYSTECTOMY     • COLONOSCOPY     • ESOPHAGOGASTRODUODENOSCOPY     • KS COLONOSCOPY FLX DX W/COLLJ SPEC WHEN PFRMD N/A 10/23/2017    Procedure: COLONOSCOPY;  Surgeon: Lashawn Parish MD;  Location: [facility] MAIN OR;  Service: Colorectal       SOCIAL HISTORY:  Social History     Tobacco Use   Smoking Status Never   Smokeless Tobacco Never        ROS:  Review of Systems   Constitutional: Negative for chills, diaphoresis and fever     HENT: Negative for congestion, drooling, ear discharge, facial swelling, nosebleeds, rhinorrhea, sneezing, trouble swallowing and voice change  Eyes: Negative for discharge, redness and itching  Respiratory: Negative for cough, choking, shortness of breath, wheezing and stridor  Cardiovascular: Negative for chest pain and palpitations  Gastrointestinal: Negative for abdominal pain, constipation, diarrhea and vomiting  Endocrine: Negative for cold intolerance and heat intolerance  Genitourinary: Negative for dysuria and flank pain  Musculoskeletal:        See HPI and PE  Skin: Negative for color change and rash  Neurological: Negative for dizziness, syncope and facial asymmetry  Psychiatric/Behavioral: Negative for agitation, self-injury and suicidal ideas  PHYSICAL EXAM:  75 y o  female sitting uncomfortably on exam chair in no acute distress  Ambulates leaning forward with use of cane  Able to go up on toes  Unable to go up on heels  Able to balance on one leg  TTP over lumbar spine  5/5 motor strength with normal sensation in bilateral lower extremities   Deep tendon reflexes intact bilateral lower extremities      RADIOGRAPHIC STUDIES:  1  CT, thoracic and lumbar spine, 3/21/2023: L2 superior endplate compression fracture with minimal loss of vertebral body height  Multilevel degenerative disc disease, quite mild given the patient's age  2  Xrays, lumbar spine, 3/21/2023: L2 compression fracture, stable  3  DXA scan, 7/13/2021: left femoral neck T-score of -2 1, osteopenia  ASSESSMENT:  1  Compression fracture of L2 lumbar vertebra, closed, initial encounter ([facility] [address] )    2  Compression fracture of L2 vertebra, initial encounter [facility])  -     Ambulatory referral to Orthopedic Surgery    3  Acute right-sided low back pain with right-sided sciatica  -     Ambulatory referral to Orthopedic Surgery        PLAN:  75 y o  female with acute L2 compression fracture and history of osteopenia       Xrays of lumbar spine were obtained and reviewed with the patient in the office today showing L2 compression fracture  In regards to compression fracture, it was discussed that majority of compression fractures heal without intervention in 6 weeks  Patient may start physical therapy to help improve symptoms at this time  Script provided for physical therapy  It was also discussed that patient should be treated for osteoporosis due to DXA scan from 2021 and recent compression fracture  Referral placed to endocrinology to discuss medical management of osteoporosis  Patient should also follow-up with family medicine for further management of lumbar spine  Patient may follow-up as needed          Scribe Attestation    I,:  Brien Chen PA-C am acting as a scribe while in the presence of the attending physician :       I,:  Odilon Almendarez MD personally performed the services described in this documentation    as scribed in my presence :

## 2023-03-22 ENCOUNTER — TELEPHONE (OUTPATIENT)
Dept: ENDOCRINOLOGY | Facility: CLINIC | Age: 68
End: 2023-03-22

## 2023-03-22 NOTE — TELEPHONE ENCOUNTER
Received referral for Rosalio Carias  Left voicemail for patient to contact office to schedule Consult/New Patient Appointment

## 2023-03-24 ENCOUNTER — TELEPHONE (OUTPATIENT)
Dept: PHYSICAL THERAPY | Facility: OTHER | Age: 68
End: 2023-03-24

## 2023-03-24 NOTE — TELEPHONE ENCOUNTER
Comp Spine Triage Call Back    Checked patient's chart to make sure she had her appt with Ortho after CSP triage   Patient was scheduled and had her appt in Ortho

## 2023-04-06 ENCOUNTER — EVALUATION (OUTPATIENT)
Dept: PHYSICAL THERAPY | Facility: HOME HEALTHCARE | Age: 68
End: 2023-04-06

## 2023-04-06 DIAGNOSIS — S32.020A COMPRESSION FRACTURE OF L2 VERTEBRA, INITIAL ENCOUNTER (HCC): ICD-10-CM

## 2023-04-06 DIAGNOSIS — M54.41 ACUTE RIGHT-SIDED LOW BACK PAIN WITH RIGHT-SIDED SCIATICA: ICD-10-CM

## 2023-04-06 DIAGNOSIS — S32.020D COMPRESSION FRACTURE OF L2 VERTEBRA WITH ROUTINE HEALING, SUBSEQUENT ENCOUNTER: Primary | ICD-10-CM

## 2023-04-06 NOTE — PROGRESS NOTES
"PT Evaluation     Today's date: 2023  Patient name: Jeferson Bernard  : 1955  MRN: [id number]  Referring provider: Joann Sommers PA-C  Dx:   Encounter Diagnosis     ICD-10-CM    1  Compression fracture of L2 vertebra with routine healing, subsequent encounter  S32 020D Ambulatory Referral to Physical Therapy      2  Compression fracture of L2 vertebra, initial encounter [facility])  S32 020A Ambulatory Referral to Physical Therapy      3  Acute right-sided low back pain with right-sided sciatica  M54 41 Ambulatory Referral to Physical Therapy                     Assessment  Assessment details: Pt Guille Rodgers is a 75 y o  who presents to OPPT with s/s consistent with lower back pain following L2 compression fracture 5-6 weeks ago  Pt presents with limited L/S mobility and core strength deficits, decreased B LE ROM and strength, impaired soft tissue mobility/limited flexibility, decreased postural awareness, and gait/balance dysfunctions  Pt with limitations with prolonged ambulation, difficulty with stair negotiation, and difficulty with lifting/carrying  She has lupus and mixed connective tissue disorders which has limited pts overall mobility but decline in balance/gait has been more rapid recently  Pt would benefit from skilled therapy services to address outlined impairments, work towards goals, and restore pts PLOF  Thank you!    Impairments: abnormal gait, abnormal or restricted ROM, abnormal movement, activity intolerance, impaired balance, impaired physical strength, lacks appropriate home exercise program, pain with function, weight-bearing intolerance, poor posture  and poor body mechanics    Goals  STGs to be achieved in 4 weeks:  -Pt to demonstrate reduced subjective pain rating \"at worst\" by at least 2-3 points from Initial Eval to allow for reduced pain with ADLs and improved functional activity tolerance    -Pt to demonstrate L/S ROM improved minimal to moderate limitations in order to improve " joint mobility and function and allow for progression of exercise program and achievement of goals    -Pt to demonstrate increased MMT of R LE by at least 1/2 grade in order to improve safety and stability with ADLs and functional mobility  LTGs to be achieved upon discharge:   -Pt will be I with HEP in order to continue to improve quality of life and independence and reduce risk for re-injury    -Pt to demonstrate return to activities of daily living at her PLOF   -Pt will return to ambulation with lease restrictive AD > 30 minutes to help facilitate return to community activities independently   -Pt to demonstrate improved function as noted by achieving or exceeding predicted score on FOTO outcomes assessment tool  Plan  Patient would benefit from: skilled physical therapy  Planned modality interventions: cryotherapy and thermotherapy: hydrocollator packs  Planned therapy interventions: abdominal trunk stabilization, balance, manual therapy, neuromuscular re-education, patient education, postural training, therapeutic activities, therapeutic exercise, home exercise program, flexibility and functional ROM exercises  Frequency: 2x week  Duration in weeks: 12  Plan of Care beginning date: 4/6/2023  Plan of Care expiration date: 6/29/2023  Treatment plan discussed with: patient        Subjective Evaluation    History of Present Illness  Mechanism of injury: Pt notes that she was experiencing falls at home more recently but she never injured herself  On 2/28/23 she was reaching down to get the paper off the front porch when she feel onto her R side and couldn't get back up; needed help to get up but then states that she felt okay  2 days after the fall pt notes that the pain became worse and she had difficulty moving so she went to ED  Imaging was (+) for L2 compression fracture  She was give TLSO brace to wear and referred to orthopedics   Pt followed up with Ortho who told her to attend therapy but did not schedule follow up at this time  She was advised to follow up with PCP  Quality of life: good    Pain  At best pain ratin  At worst pain ratin  Quality: dull ache  Aggravating factors: sitting    Social Support  Steps to enter house: yes  Lives in: multiple-level home  Lives with: spouse    Employment status: not working    Diagnostic Tests  X-ray: abnormal  CT scan: abnormal  Treatments  Current treatment: physical therapy  Patient Goals  Patient goals for therapy: decreased edema, decreased pain, improved balance, increased motion, increased strength and independence with ADLs/IADLs          Objective     Postural Observations  Seated posture: fair  Standing posture: fair    Additional Postural Observation Details  Forward trunk lean     Neurological Testing     Sensation     Lumbar   Left   Intact: light touch    Right   Intact: light touch    Active Range of Motion     Lumbar   Flexion:  with pain Restriction level: moderate  Extension:  with pain Restriction level: maximal  Left lateral flexion:  Restriction level: moderate  Right lateral flexion:  Restriction level: moderate  Left Hip   Flexion: 95 degrees   Abduction: 35 degrees     Right Hip   Flexion: 90 degrees   Abduction: 30 degrees   Left Knee   Normal active range of motion    Right Knee   Normal active range of motion    Strength/Myotome Testing     Left Hip   Planes of Motion   Flexion: 4  Abduction: 4  Adduction: 4    Right Hip   Planes of Motion   Flexion: 3+  Abduction: 4-  Adduction: 4-    Left Knee   Flexion: 4+  Extension: 4+    Right Knee   Flexion: 4  Extension: 4-    Tests     Lumbar     Left   Positive passive SLR  Right   Positive passive SLR       Right Pelvic Girdle/Sacrum   Positive: active SLR test      Ambulation   Weight-Bearing Status   Assistive device used: single point cane    Additional Weight-Bearing Status Details  Tolerance < 10 minutes     Ambulation: Stairs   Pattern: non-reciprocal  Railings: one "rail  Pattern: non-reciprocal  Railings: one rail    Observational Gait   Gait: antalgic   Decreased walking speed and stride length  Comments   TUG: with use of SPC x 23\"     Romberg firm EO x 15\"   Romberg firm EC x 5\"     Tandem L back firm EO x 8\"   Tandem R back firm EO x 7\"              Re-eval Date: 30 days or 10th visit - 5/5/23     Precautions: Lupus;  Mixed connective tissue disease        Manuals 4/6       B HS                                 Neuro Re-Ed         Romberg   Firm EO / EC         Tandem  Firm EO / EC                 PPT        PPT with jose francisco         TA        TA + OH lifts        MTP/LTP        Palloff press                 Ther Ex        NuStep - aerobic conditioning         Standing hip flex/abd/ext        Squats         Step-ups         LTR        SKTC        SLR        S/L SLR        Clamshells         Heel walk outs         Bridges                                 Ther Activity                        Gait Training                        Modalities                             "

## 2023-04-17 ENCOUNTER — APPOINTMENT (OUTPATIENT)
Dept: PHYSICAL THERAPY | Facility: HOME HEALTHCARE | Age: 68
End: 2023-04-17

## 2023-04-24 ENCOUNTER — APPOINTMENT (OUTPATIENT)
Dept: PHYSICAL THERAPY | Facility: HOME HEALTHCARE | Age: 68
End: 2023-04-24

## 2023-04-24 NOTE — TELEPHONE ENCOUNTER
Patient notified with same Nsaids Counseling: NSAID Counseling: I discussed with the patient that NSAIDs should be taken with food. Prolonged use of NSAIDs can result in the development of stomach ulcers.  Patient advised to stop taking NSAIDs if abdominal pain occurs.  The patient verbalized understanding of the proper use and possible adverse effects of NSAIDs.  All of the patient's questions and concerns were addressed.

## 2023-04-25 ENCOUNTER — OFFICE VISIT (OUTPATIENT)
Dept: PHYSICAL THERAPY | Facility: HOME HEALTHCARE | Age: 68
End: 2023-04-25

## 2023-04-25 DIAGNOSIS — M54.41 ACUTE RIGHT-SIDED LOW BACK PAIN WITH RIGHT-SIDED SCIATICA: ICD-10-CM

## 2023-04-25 DIAGNOSIS — S32.020D COMPRESSION FRACTURE OF L2 VERTEBRA WITH ROUTINE HEALING, SUBSEQUENT ENCOUNTER: Primary | ICD-10-CM

## 2023-04-25 DIAGNOSIS — S32.020A COMPRESSION FRACTURE OF L2 VERTEBRA, INITIAL ENCOUNTER (HCC): ICD-10-CM

## 2023-04-25 NOTE — PROGRESS NOTES
"Daily Note     Today's date: 2023  Patient name: Shanita Milton  : 1955  MRN: [id number]  Referring provider: Fer nEamorado PA-C  Dx:   Encounter Diagnosis     ICD-10-CM    1  Compression fracture of L2 vertebra with routine healing, subsequent encounter  S32 020D       2  [facility] of L2 vertebra, [facility] )  S32 020A       3  Acute right-sided low back pain with right-sided sciatica  M54 41                      Subjective: Pt reports she has pain LB and R hip  Objective: See treatment diary below      Assessment: Tolerated treatment fair  Verbal cues needed to perform exercises correctly and for upright posture  NuStep completed for aerobic conditioning  Fatigue noted with exercise  Progressed to TA and MTP/LTP today  Balance deficits noted t/o session  Pt ambulates with decreased step length and shuffling gait with verbal cues needed for correct gait  Patient would benefit from continued PT      Plan: Continue per plan of care  Re-eval Date: 30 days or 10th visit - 23    Precautions: Lupus;  Mixed connective tissue disease        Manuals 4/6 4/10 4/13 4/20 4/25   B HS                                 Neuro Re-Ed         Romberg   Firm EO / EC   EO x 24\"   EC x 22\"     Foam WBOS   EO 24\",  30\"    EO 30\"x1   0HHA firm    EC 30\"x 1   firm  1  Hand hover EO  30\" x 1  0 HHA  Firm     EC firm  30\"x 1   1 hand hover  EO 30\"x 1  0 HHA firm     EC firm  30\"x 1   CG A x1    Tandem  Firm EO / EC     X 3 to serge   R/L firm EO X 3 to serge   R/L firm RO   Romberg firm at doorway   Cone taps   High x 10 keith   Low x 5 keith  Cone taps   X 5 cones  keith  <> x 1 ea  Cone taps  X 5 cones Keith  <> x 1 ea  Cone taps   X 5 cones Keith <> x 1 ea       Cone reach  X 8 ea R/L Cone reach   X 10 ea R/L Cone reach  X 10 ea R/L   PPT        PPT with marches         TA     5\"x10 seated    TA + OH lifts        MTP/LTP     Red 1x15 ea    Palloff press                 Ther Ex        NuStep - aerobic " conditioning   L2   10 minutes  HR: 92   SpO2: 97%  L2 10 min  Spo2 97%  HR 89 bpm L2  10 min  Spo2  97%  HR 76 bpm L2  10 min  Spo2  98%  HR 83 bpm   Standing hip flex/abd/ext  Flex /abd   X 15 ea   Abd R held - 2* pain Flex/abd  1x15 ea Michael  Flex/abd  1x15 ea  Flex/abd   1x15 ea Michael   Squats     1x10  1x10    Step-ups     B Fwd 1x10 Mihcael B Fwd 1x10 Michael   LTR        SKTC        SLR        S/L SLR        Clamshells         Heel walk outs         Bridges         Seated opening/closing B UE/LE   X 10   Verbal cues for coordinated movements  X 10 Vc's  For coordinated movements                      Ther Activity                        Gait Training        Side stepping with hand over hand assist   <> 20 feet   X 2  Pbars   <> x 3  Pbars   <>x 3  P bars   <> x 3    Ambulation without use of AD   1 lap   vc's for improved step lengths  1 lap   VC's for correct gait 1 lap   VC' s for correct gait  1 lap VC's for correct gait                            Modalities

## 2023-04-27 ENCOUNTER — OFFICE VISIT (OUTPATIENT)
Dept: PHYSICAL THERAPY | Facility: HOME HEALTHCARE | Age: 68
End: 2023-04-27

## 2023-04-27 DIAGNOSIS — S32.020A COMPRESSION FRACTURE OF L2 VERTEBRA, INITIAL ENCOUNTER (HCC): ICD-10-CM

## 2023-04-27 DIAGNOSIS — M54.41 ACUTE RIGHT-SIDED LOW BACK PAIN WITH RIGHT-SIDED SCIATICA: ICD-10-CM

## 2023-04-27 DIAGNOSIS — S32.020D COMPRESSION FRACTURE OF L2 VERTEBRA WITH ROUTINE HEALING, SUBSEQUENT ENCOUNTER: Primary | ICD-10-CM

## 2023-04-27 NOTE — PROGRESS NOTES
"Daily Note     Today's date: 2023  Patient name: Layla Dietrich  : 1955  MRN: [id number]  Referring provider: Timothy Carrillo PA-C  Dx:   Encounter Diagnosis     ICD-10-CM    1  Compression fracture of L2 vertebra with routine healing, subsequent encounter  S32 020D       2  Compression fracture of L2 vertebra, initial encounter ([facility] )  S32 020A       3  Acute right-sided low back pain with right-sided sciatica  M54 41                      Subjective: Pt states \"it depends how I get out of bed\" when asked how she was doing today  Objective: See treatment diary below      Assessment: Pt continues to have pain in the R hip with prolonged standing/ambulation, short seated rest break providing during which time pt completed TA holds  She continues to limit herself due to fear of falling despite demonstrating adequate balance for current program; PT encouraging pt to avoid HHA on pbars as able  Plan: Continue per plan of care  Re-eval Date: 30 days or 10th visit - 23    Precautions: Lupus;  Mixed connective tissue disease        Manuals 4/27 4/10 4/13 4/20 4/25   B HS                                 Neuro Re-Ed         Romberg   Firm EO / EC  EC Firm   30\" x 2   0 HHA    WBOS   FOAM EO/EC  X 30\" each   CGA  EO x 24\"   EC x 22\"     Foam WBOS   EO 24\",  30\"    EO 30\"x1   0HHA firm    EC 30\"x 1   firm  1  Hand hover EO  30\" x 1  0 HHA  Firm     EC firm  30\"x 1   1 hand hover  EO 30\"x 1  0 HHA firm     EC firm  30\"x 1   CG A x1    Tandem  Firm EO / EC  X 3 to tolerance   R/L firm EO    X 3 to serge   R/L firm EO X 3 to serge   R/L firm RO   Romberg firm at doorway  Cone taps   X 5 cones   X 2 keith each  Cone taps   High x 10 keith   Low x 5 keith  Cone taps   X 5 cones  keith  <> x 1 ea  Cone taps  X 5 cones Keith  <> x 1 ea  Cone taps   X 5 cones Keith <> x 1 ea     Cone reaching   X 10 ea R/L   Cone reach  X 8 ea R/L Cone reach   X 10 ea R/L Cone reach  X 10 ea R/L   PPT        PPT with jose francisco         TA " "Seated   5\" x 15     5\"x10 seated    TA + OH lifts        MTP/LTP     Red 1x15 ea    Palloff press                 Ther Ex        NuStep - aerobic conditioning  L2 10 minutes   HR 78  SpO2 98%  L2   10 minutes  HR: 92   SpO2: 97%  L2 10 min  Spo2 97%  HR 89 bpm L2  10 min  Spo2  97%  HR 76 bpm L2  10 min   Spo2  98%  HR 83 bpm   Standing hip flex/abd/ext Flex/abd x 15 ea  Flex /abd   X 15 ea   Abd R held - 2* pain Flex/abd  1x15 ea Michael  Flex/abd  1x15 ea  Flex/abd   1x15 ea Michael   Squats     1x10  1x10    Step-ups  C Fwd   X 10 michael    B Fwd 1x10 Michael B Fwd 1x10 Michael   LTR        SKTC        SLR        S/L SLR        Clamshells         Heel walk outs         Bridges         Seated opening/closing B UE/LE   X 10   Verbal cues for coordinated movements  X 10 Vc's  For coordinated movements                      Ther Activity                        Gait Training        Side stepping with hand over hand assist  <> pbars   X 3   1 HHA  <> 20 feet   X 2  Pbars   <> x 3  Pbars   <>x 3  P bars   <> x 3    Ambulation without use of AD   1 lap   vc's for improved step lengths  1 lap   VC's for correct gait 1 lap   VC' s for correct gait  1 lap VC's for correct gait                            Modalities                             "

## 2023-05-01 ENCOUNTER — APPOINTMENT (OUTPATIENT)
Dept: PHYSICAL THERAPY | Facility: HOME HEALTHCARE | Age: 68
End: 2023-05-01
Payer: MEDICARE

## 2023-05-04 ENCOUNTER — OFFICE VISIT (OUTPATIENT)
Dept: PHYSICAL THERAPY | Facility: HOME HEALTHCARE | Age: 68
End: 2023-05-04

## 2023-05-04 DIAGNOSIS — S32.020A COMPRESSION FRACTURE OF L2 VERTEBRA, INITIAL ENCOUNTER (HCC): ICD-10-CM

## 2023-05-04 DIAGNOSIS — M54.41 ACUTE RIGHT-SIDED LOW BACK PAIN WITH RIGHT-SIDED SCIATICA: ICD-10-CM

## 2023-05-04 DIAGNOSIS — S32.020D COMPRESSION FRACTURE OF L2 VERTEBRA WITH ROUTINE HEALING, SUBSEQUENT ENCOUNTER: Primary | ICD-10-CM

## 2023-05-04 NOTE — PROGRESS NOTES
"Daily Note     Today's date: 2023  Patient name: Ruddy Anderson  : 1955  MRN: [id number]  Referring provider: Douglas Underwood PA-C  Dx:   Encounter Diagnosis     ICD-10-CM    1  Compression fracture of L2 vertebra with routine healing, subsequent encounter  S32 020D       2  [facility]  S32 020A       3  Acute right-sided low back pain with right-sided sciatica  M54 41                      Subjective: Pt notes that she is so stiff all the time and thinks the weather (rainy) makes it worse  Objective: See treatment diary below      Assessment: Pt with c/o hip and lower back pain with all standing activities today; added new activities in supine for further hip and core strengthening with less c/o pain  Pt remains slow with gait and transitional movement patterns  Significant weakness and poor endurance remaining primary limiting factors  Plan: Continue per plan of care  Re-eval Date: 30 days or 10th visit - 23    Precautions: Lupus;  Mixed connective tissue disease        Manuals    B HS                                 Neuro Re-Ed         Romberg   Firm EO / EC  EC Firm   30\" x 2   0 HHA    WBOS   FOAM EO/EC  X 30\" each   CGA  WBOS Foam   EO/EC x 30\" each     Rhomberg FOAM   30\" EO  EO 30\"x1   0HHA firm    EC 30\"x 1   firm  1  Hand hover EO  30\" x 1  0 HHA  Firm     EC firm  30\"x 1   1 hand hover  EO 30\"x 1  0 HHA firm     EC firm  30\"x 1   CG A x1    Tandem  Firm EO / EC  X 3 to tolerance   R/L firm EO  Firm EO   R/L x 30\" each   X 3 to serge   R/L firm EO X 3 to serge   R/L firm RO   Romberg firm at doorway  Cone taps   X 5 cones   X 2 keith each   Cone taps   X 5 cones  keith  <> x 1 ea  Cone taps  X 5 cones Keith  <> x 1 ea  Cone taps   X 5 cones Keith <> x 1 ea     Cone reaching   X 10 ea R/L  Cone scavenger hunt t/o clinic   10 cones  Cone reach  X 8 ea R/L Cone reach   X 10 ea R/L Cone reach  X 10 ea R/L   PPT  5\" x " "10   Tactile cues to teach       PPT with marchholden         TA Seated   5\" x 15  Seated   Verbal cues needed to avoid breath holding   5' review correct form    5\"x10 seated    TA + OH lifts        MTP/LTP     Red 1x15 ea    Palloff press                 Ther Ex        NuStep - aerobic conditioning  L2 10 minutes   HR 78  SpO2 98%  L2 10 minutes   SpO2 98%  HR 82 bpm L2 10 min  Spo2 97%  HR 89 bpm L2  10 min  Spo2  97%  HR 76 bpm L2  10 min   Spo2  98%  HR 83 bpm   HR/TR  X 15       Standing hip flex/abd/ext Flex/abd x 15 ea   Flex/abd  1x15 ea Michael  Flex/abd  1x15 ea  Flex/abd   1x15 ea Michael   Squats     1x10  1x10    Step-ups  C Fwd   X 10 michael    B Fwd 1x10 Michael B Fwd 1x10 Michael   Seated hip add  5\" x 15      SKTC        SLR  Supine  X 10 michael       S/L SLR        Clamshells         Heel walk outs         Bridges         Seated opening/closing B UE/LE    X 10 Vc's  For coordinated movements                      Ther Activity                        Gait Training        Side stepping with hand over hand assist  <> pbars   X 3   1 HHA   Pbars   <> x 3  Pbars   <>x 3  P bars   <> x 3    Ambulation without use of AD    1 lap   VC's for correct gait 1 lap   VC' s for correct gait  1 lap VC's for correct gait                            Modalities                             "

## 2023-05-08 ENCOUNTER — OFFICE VISIT (OUTPATIENT)
Dept: PHYSICAL THERAPY | Facility: HOME HEALTHCARE | Age: 68
End: 2023-05-08

## 2023-05-08 DIAGNOSIS — S32.020D COMPRESSION FRACTURE OF L2 VERTEBRA WITH ROUTINE HEALING, SUBSEQUENT ENCOUNTER: Primary | ICD-10-CM

## 2023-05-08 DIAGNOSIS — S32.020A COMPRESSION FRACTURE OF L2 VERTEBRA, INITIAL ENCOUNTER (HCC): ICD-10-CM

## 2023-05-08 DIAGNOSIS — M54.41 ACUTE RIGHT-SIDED LOW BACK PAIN WITH RIGHT-SIDED SCIATICA: ICD-10-CM

## 2023-05-08 NOTE — PROGRESS NOTES
"Daily Note     Today's date: 2023  Patient name: Alanna Mares  : 1955  MRN: [id number]  Referring provider: Cindy Melissa PA-C  Dx:   Encounter Diagnosis     ICD-10-CM    1  Compression fracture of L2 vertebra with routine healing, subsequent encounter  S32 020D       2  [facility]  S32 020A       3  Acute right-sided low back pain with right-sided sciatica  M54 41                      Subjective: Pt states \" I'm really sore today  \"      Objective: See treatment diary below      Assessment: Tolerated treatment fair  Pt reports R hip pain t/o all exercises  Verbal cues needed to perform exercises correctly  Pt fatigued with exercises  Core strength deficits noted  Pt ambulating with shuffling gait t/o clinic with verbal cues needed to correct gait  Patient would benefit from continued PT      Plan: Continue per plan of care  Re-eval Date: 30 days or 10th visit - 23    Precautions: Lupus;  Mixed connective tissue disease        Manuals    B HS                                 Neuro Re-Ed         Romberg   Firm EO / EC  EC Firm   30\" x 2   0 HHA    WBOS   FOAM EO/EC  X 30\" each   CGA  WBOS Foam   EO/EC x 30\" each     Rhomberg FOAM   30\" EO  WBOS EO/EC 30\"x1   0HHA firm    rhomberg foam 30\" EO EO  30\" x 1  0 HHA  Firm     EC firm  30\"x 1   1 hand hover  EO 30\"x 1  0 HHA firm     EC firm  30\"x 1   CG A x1    Tandem  Firm EO / EC  X 3 to tolerance   R/L firm EO  Firm EO   R/L x 30\" each  Firm EO  R/L x 30\" ea  X 3 to serge   R/L firm EO X 3 to serge   R/L firm RO   Romberg firm at doorway  Cone taps   X 5 cones   X 2 keith each    Cone taps  X 5 cones Keith  <> x 1 ea  Cone taps   X 5 cones Keith <> x 1 ea     Cone reaching   X 10 ea R/L  Cone scavenger hunt t/o clinic   10 cones  Cone scavenger hunt t/o clinic  10 cones  Cone reach   X 10 ea R/L Cone reach  X 10 ea R/L   PPT  5\" x 10   Tactile cues to teach  5\"x10   tactile cues    " "  PPT with marches         TA Seated   5\" x 15  Seated   Verbal cues needed to avoid breath holding   5' review correct form  Supine 5\"x 10   5\"x10 seated    TA + OH lifts        MTP/LTP     Red 1x15 ea    Palloff press                 Ther Ex        NuStep - aerobic conditioning  L2 10 minutes   HR 78  SpO2 98%  L2 10 minutes   SpO2 98%  HR 82 bpm L2 10 min  Spo2 97%  HR 93 bpm L2  10 min  Spo2  97%  HR 76 bpm L2  10 min   Spo2  98%  HR 83 bpm   HR/TR  X 15  X 15      Standing hip flex/abd/ext Flex/abd x 15 ea    Flex/abd  1x15 ea  Flex/abd   1x15 ea Michael   Squats     1x10  1x10    Step-ups  C Fwd   X 10 michael    B Fwd 1x10 Michael B Fwd 1x10 Michael   Seated hip add  5\" x 15 5\"x15     SKTC        SLR  Supine  X 10 michael  Supine x10 Michael     S/L SLR        Clamshells         Heel walk outs         Bridges         Seated opening/closing B UE/LE                         Ther Activity                        Gait Training        Side stepping with hand over hand assist  <> pbars   X 3   1 HHA    Pbars   <>x 3  P bars   <> x 3    Ambulation without use of AD     1 lap   VC' s for correct gait  1 lap VC's for correct gait                            Modalities                             "

## 2023-05-11 ENCOUNTER — OFFICE VISIT (OUTPATIENT)
Dept: PHYSICAL THERAPY | Facility: HOME HEALTHCARE | Age: 68
End: 2023-05-11

## 2023-05-11 DIAGNOSIS — S32.020D COMPRESSION FRACTURE OF L2 VERTEBRA WITH ROUTINE HEALING, SUBSEQUENT ENCOUNTER: Primary | ICD-10-CM

## 2023-05-11 DIAGNOSIS — S32.020A COMPRESSION FRACTURE OF L2 VERTEBRA, INITIAL ENCOUNTER (HCC): ICD-10-CM

## 2023-05-11 DIAGNOSIS — M54.41 ACUTE RIGHT-SIDED LOW BACK PAIN WITH RIGHT-SIDED SCIATICA: ICD-10-CM

## 2023-05-11 NOTE — PROGRESS NOTES
"Daily Note     Today's date: 2023  Patient name: Marti Bustos  : 1955  MRN: [id number]  Referring provider: Renaldo Garcia PA-C  Dx:   Encounter Diagnosis     ICD-10-CM    1  Compression fracture of L2 vertebra with routine healing, subsequent encounter  S32 020D       2  [facility]  S32 020A       3  Acute right-sided low back pain with right-sided sciatica  M54 41                      Subjective: Pt reports she has pain in her R LB, R hip and down R LE  Objective: See treatment diary below      Assessment: Tolerated treatment fairly well  Verbal cues needed t/o exercises to perform correctly  Progressed to ball toss/bounce on foam and hurdles this session  Pt challenged with  lifting legs up to step over hurdles  Fatigue noted with exercise  Pt reports pain R hip and R LB t/o session  Patient would benefit from continued PT      Plan: Continue per plan of care  Re-eval Date: 30 days or 10th visit - 23    Precautions: Lupus;  Mixed connective tissue disease        Manuals    B HS                                 Neuro Re-Ed         Romberg   Firm EO / EC  EC Firm   30\" x 2   0 HHA    WBOS   FOAM EO/EC  X 30\" each   CGA  WBOS Foam   EO/EC x 30\" each     Rhomberg FOAM   30\" EO  WBOS EO/EC 30\"x1   0HHA firm    rhomberg foam 30\" EO WBOS EO/EC  30\" x 1  0 HHA  Firm     Romberg foam   30\"x 1 EO EO 30\"x 1  0 HHA firm     EC firm  30\"x 1   CG A x1    Tandem  Firm EO / EC  X 3 to tolerance   R/L firm EO  Firm EO   R/L x 30\" each  Firm EO  R/L x 30\" ea  Firm EO  R/L x 30\" ea  X 3 to serge   R/L firm RO   Romberg firm at doorway  Cone taps   X 5 cones   X 2 keith each    Ball toss and bounce on foam CGA x 1  X 20 Cone taps   X 5 cones Keith <> x 1 ea     Cone reaching   X 10 ea R/L  Cone scavenger hunt t/o clinic   10 cones  Cone scavenger hunt t/o clinic  10 cones  Cone scavenger hunt t/o clinic   10 cones  Cone reach  X 10 ea " "R/L   PPT  5\" x 10   Tactile cues to teach  5\"x10   tactile cues  5\"x10 tactile cues     PPT with marches     3 Hurdles   <> x 2  Pbars 2 HHA    TA Seated   5\" x 15  Seated   Verbal cues needed to avoid breath holding   5' review correct form  Supine 5\"x 10  Seated  5\"x 10 5\"x10 seated    TA + OH lifts        MTP/LTP     Red 1x15 ea    Palloff press                 Ther Ex        NuStep - aerobic conditioning  L2 10 minutes   HR 78  SpO2 98%  L2 10 minutes   SpO2 98%  HR 82 bpm L2 10 min  Spo2 97%  HR 93 bpm L2  10 min  Spo2  97%  HR 79 bpm L2  10 min   Spo2  98%  HR 83 bpm   HR/TR  X 15  X 15  X 15    Standing hip flex/abd/ext Flex/abd x 15 ea    3 way x 15 ea Michael Flex/abd   1x15 ea Michael   Squats      1x10    Step-ups  C Fwd   X 10 michael     B Fwd 1x10 Michael   Seated hip add  5\" x 15 5\"x15 5\"x 15    SKTC        SLR  Supine  X 10 michael  Supine x10 Michael     S/L SLR        Clamshells         Heel walk outs         Bridges         Seated opening/closing B UE/LE                         Ther Activity                        Gait Training        Side stepping with hand over hand assist  <> pbars   X 3   1 HHA     P bars   <> x 3    Ambulation without use of AD      1 lap VC's for correct gait                            Modalities                             "

## 2023-05-15 ENCOUNTER — EVALUATION (OUTPATIENT)
Dept: PHYSICAL THERAPY | Facility: HOME HEALTHCARE | Age: 68
End: 2023-05-15

## 2023-05-15 DIAGNOSIS — S32.020D COMPRESSION FRACTURE OF L2 VERTEBRA WITH ROUTINE HEALING, SUBSEQUENT ENCOUNTER: Primary | ICD-10-CM

## 2023-05-15 DIAGNOSIS — S32.020A COMPRESSION FRACTURE OF L2 VERTEBRA, INITIAL ENCOUNTER (HCC): ICD-10-CM

## 2023-05-15 DIAGNOSIS — M54.41 ACUTE RIGHT-SIDED LOW BACK PAIN WITH RIGHT-SIDED SCIATICA: ICD-10-CM

## 2023-05-15 NOTE — PROGRESS NOTES
"Daily Note     Today's date: 5/15/2023  Patient name: Babatunde Velasco  : 1955  MRN: [id number]  Referring provider: Parish Orellana PA-C  Dx:   Encounter Diagnosis     ICD-10-CM    1  Compression fracture of L2 vertebra with routine healing, subsequent encounter  S32 020D       2  Compression fracture of L2 vertebra, initial encounter ([facility] )  S32 020A       3  Acute right-sided low back pain with right-sided sciatica  M54 41                      Subjective: Pt reports pain in her R hip today  Objective: See treatment diary below      Assessment: Tolerated treatment fairly well  Verbal cues needed t/o exercises to perform correctly  Pt reports pain R LB and  R hip t/o exercises  Fatigue noted with exercise  Pt still challenged with hurdles  Patient would benefit from continued PT      Plan: Continue per plan of care  Re-eval Date: 30 days or 10th visit - 23    Precautions: Lupus;  Mixed connective tissue disease        Manuals 4/27 5/4 5/8 5/11 5/15   B HS                                 Neuro Re-Ed         Romberg   Firm EO / EC  EC Firm   30\" x 2   0 HHA    WBOS   FOAM EO/EC  X 30\" each   CGA  WBOS Foam   EO/EC x 30\" each     Rhomberg FOAM   30\" EO  WBOS EO/EC 30\"x1   0HHA firm    rhomberg foam 30\" EO WBOS EO/EC  30\" x 1  0 HHA  Firm     Romberg foam   30\"x 1 EO WBOS EO/EC 30\"x 1  0 HHA firm     Romberg foam   30\"x 1 EO   Tandem  Firm EO / EC  X 3 to tolerance   R/L firm EO  Firm EO   R/L x 30\" each  Firm EO  R/L x 30\" ea  Firm EO  R/L x 30\" ea  Firm EO  R/L  X 30\" ea    Romberg firm at doorway  Cone taps   X 5 cones   X 2 keith each    Ball toss and bounce on foam CGA x 1  X 20 Ball toss and bounce on foam   CGA x 1   X 20     Cone reaching   X 10 ea R/L  Cone scavenger hunt t/o clinic   10 cones  Cone scavenger hunt t/o clinic  10 cones  Cone scavenger hunt t/o clinic   10 cones  Cone scavenger hunt  X 10 cones    PPT  5\" x 10   Tactile cues to teach  5\"x10   tactile cues  5\"x10 tactile " "cues     PPT with marches     3 Hurdles   <> x 2  Pbars 2 HHA 3 hurdles   <> x 2   Pbars   TA Seated   5\" x 15  Seated   Verbal cues needed to avoid breath holding   5' review correct form  Supine 5\"x 10  Seated  5\"x 10 5\"x10 seated    TA + OH lifts        MTP/LTP        Palloff press                 Ther Ex        NuStep - aerobic conditioning  L2 10 minutes   HR 78  SpO2 98%  L2 10 minutes   SpO2 98%  HR 82 bpm L2 10 min  Spo2 97%  HR 93 bpm L2  10 min  Spo2  97%  HR 79 bpm L2  10 min   Spo2  99%  HR 87 bpm   HR/TR  X 15  X 15  X 15 X 15   Standing hip flex/abd/ext Flex/abd x 15 ea    3 way x 15 ea Michael 3 way   1x15 ea Michael   Squats         Step-ups  C Fwd   X 10 michael        Seated hip add  5\" x 15 5\"x15 5\"x 15 5\"x 15    SKTC        SLR  Supine  X 10 michael  Supine x10 Michael     S/L SLR        Clamshells         Heel walk outs         Bridges         Seated opening/closing B UE/LE                         Ther Activity                        Gait Training        Side stepping with hand over hand assist  <> pbars   X 3   1 HHA        Ambulation without use of AD                                 Modalities                             "

## 2023-05-16 NOTE — PROGRESS NOTES
PT Re-Evaluation  and PT Discharge    Today's date: 2023  Patient name: Marla Stein  : 1955  MRN: [id number]  Referring provider: He Macias PA-C  Dx:   Encounter Diagnosis     ICD-10-CM    1  Compression fracture of L2 vertebra with routine healing, subsequent encounter  S32 020D       2  [facility]  S32 020A       3  Acute right-sided low back pain with right-sided sciatica  M54 41           Start Time: 1300  Stop Time: 1350  Total time in clinic (min): 50 minutes    Assessment  Assessment details: Pt Jumana Collins is a 75 y o  who presents to OPPT with s/s consistent with lower back pain following L2 compression fracture 5-6 weeks ago  Pt presents with limited L/S mobility and core strength deficits, decreased B LE ROM and strength, impaired soft tissue mobility/limited flexibility, decreased postural awareness, and gait/balance dysfunctions  Pt with limitations with prolonged ambulation, difficulty with stair negotiation, and difficulty with lifting/carrying  She has lupus and mixed connective tissue disorders which has limited pts overall mobility but decline in balance/gait has been more rapid recently  Pt would benefit from skilled therapy services to address outlined impairments, work towards goals, and restore pts PLOF  Thank you! UPDATE: pt with little motivation to participate in therapy; frequent rest breaks need due to complaints of increased pain in the R hip  She is showing slight progress with static balance but also continues to self limit herself due to fear of falling  PT has dicussed R hip pain with pt and pts ; advised to speak to PCP or Orthopedic as R hip has not been looked at  UPDATE - pt followed up with PCP who agrees pain is most likely coming from the hip and ordered x-rays  She was advised to stop therapy at this time; [facility]  [facility]     Impairments: abnormal gait, abnormal or restricted ROM, abnormal "movement, activity intolerance, impaired balance, impaired physical strength, lacks appropriate home exercise program, pain with function, weight-bearing intolerance, poor posture  and poor body mechanics    Goals  STGs to be achieved in 4 weeks: - not met   -Pt to demonstrate reduced subjective pain rating \"at worst\" by at least 2-3 points from Initial Eval to allow for reduced pain with ADLs and improved functional activity tolerance    -Pt to demonstrate L/S ROM improved minimal to moderate limitations in order to improve joint mobility and function and allow for progression of exercise program and achievement of goals    -Pt to demonstrate increased MMT of R LE by at least 1/2 grade in order to improve safety and stability with ADLs and functional mobility  LTGs to be achieved upon discharge: - not met   -Pt will be I with HEP in order to continue to improve quality of life and independence and reduce risk for re-injury    -Pt to demonstrate return to activities of daily living at her PLOF   -Pt will return to ambulation with lease restrictive AD > 30 minutes to help facilitate return to community activities independently   -Pt to demonstrate improved function as noted by achieving or exceeding predicted score on FOTO outcomes assessment tool            Plan  Plan details: DC from OPPT   Patient would benefit from: skilled physical therapy  Planned modality interventions: cryotherapy and thermotherapy: hydrocollator packs  Planned therapy interventions: abdominal trunk stabilization, balance, manual therapy, neuromuscular re-education, patient education, postural training, therapeutic activities, therapeutic exercise, home exercise program, flexibility and functional ROM exercises  Frequency: 2x week  Duration in weeks: 12  Plan of Care beginning date: 4/6/2023  Plan of Care expiration date: 6/29/2023  Treatment plan discussed with: patient        Subjective Evaluation    History of Present Illness  Mechanism " of injury: Pt notes that she was experiencing falls at home more recently but she never injured herself  On 23 she was reaching down to get the paper off the front porch when she feel onto her R side and couldn't get back up; needed help to get up but then states that she felt okay  2 days after the fall pt notes that the pain became worse and she had difficulty moving so she went to ED  Imaging was (+) for L2 compression fracture  She was give TLSO brace to wear and referred to orthopedics  Pt followed up with Ortho who told her to attend therapy but did not schedule follow up at this time  She was advised to follow up with PCP     Quality of life: good    Pain  At best pain ratin  At worst pain ratin  Quality: dull ache  Aggravating factors: sitting    Social Support  Steps to enter house: yes  Lives in: multiple-level home  Lives with: spouse    Employment status: not working    Diagnostic Tests  X-ray: abnormal  CT scan: abnormal  Treatments  Current treatment: physical therapy  Patient Goals  Patient goals for therapy: decreased edema, decreased pain, improved balance, increased motion, increased strength and independence with ADLs/IADLs          Objective     Postural Observations  Seated posture: fair  Standing posture: fair    Additional Postural Observation Details  Forward trunk lean     Neurological Testing     Sensation     Lumbar   Left   Intact: light touch    Right   Intact: light touch    Active Range of Motion     Lumbar   Flexion:  with pain Restriction level: moderate  Extension:  with pain Restriction level: maximal  Left lateral flexion:  Restriction level: moderate  Right lateral flexion:  Restriction level: moderate  Left Hip   Flexion: 95 degrees   Abduction: 35 degrees     Right Hip   Flexion: 90 degrees   Abduction: 30 degrees   Left Knee   Normal active range of motion    Right Knee   Normal active range of motion    Strength/Myotome Testing     Left Hip   Planes of Motion "  Flexion: 4  Abduction: 4  Adduction: 4    Right Hip   Planes of Motion   Flexion: 3+  Abduction: 4-  Adduction: 4-    Left Knee   Flexion: 4+  Extension: 4+    Right Knee   Flexion: 4  Extension: 4-    Tests     Lumbar     Left   Positive passive SLR  Right   Positive passive SLR  Right Pelvic Girdle/Sacrum   Positive: active SLR test      Ambulation   Weight-Bearing Status   Assistive device used: single point cane    Additional Weight-Bearing Status Details  Tolerance < 10 minutes     Ambulation: Stairs   Pattern: non-reciprocal  Railings: one rail  Pattern: non-reciprocal  Railings: one rail    Observational Gait   Gait: antalgic   Decreased walking speed and stride length       Comments   TUG: with use of SPC x 23\"     Romberg firm EO x 15\"   Romberg firm EC x 5\"     Tandem L back firm EO x 8\"   Tandem R back firm EO x 7\"                 "

## 2023-05-18 ENCOUNTER — APPOINTMENT (OUTPATIENT)
Dept: PHYSICAL THERAPY | Facility: HOME HEALTHCARE | Age: 68
End: 2023-05-18
Payer: MEDICARE

## 2023-05-19 ENCOUNTER — OFFICE VISIT (OUTPATIENT)
Dept: FAMILY MEDICINE CLINIC | Facility: CLINIC | Age: 68
End: 2023-05-19

## 2023-05-19 VITALS
HEART RATE: 76 BPM | BODY MASS INDEX: 33.79 KG/M2 | WEIGHT: 179 LBS | TEMPERATURE: 97.7 F | DIASTOLIC BLOOD PRESSURE: 84 MMHG | RESPIRATION RATE: 20 BRPM | HEIGHT: 61 IN | SYSTOLIC BLOOD PRESSURE: 134 MMHG

## 2023-05-19 DIAGNOSIS — S32.020A COMPRESSION FRACTURE OF L2 VERTEBRA, INITIAL ENCOUNTER (HCC): ICD-10-CM

## 2023-05-19 DIAGNOSIS — R91.1 PULMONARY NODULE: Primary | ICD-10-CM

## 2023-05-19 DIAGNOSIS — M35.1 MIXED CONNECTIVE TISSUE DISEASE (HCC): ICD-10-CM

## 2023-05-19 DIAGNOSIS — C50.919 MALIGNANT NEOPLASM OF FEMALE BREAST, UNSPECIFIED ESTROGEN RECEPTOR STATUS, UNSPECIFIED LATERALITY, UNSPECIFIED SITE OF BREAST (HCC): ICD-10-CM

## 2023-05-19 DIAGNOSIS — Z98.890 HISTORY OF ESOPHAGEAL DILATATION: ICD-10-CM

## 2023-05-19 DIAGNOSIS — Z85.3 HISTORY OF BREAST CANCER: ICD-10-CM

## 2023-05-19 RX ORDER — NAPROXEN 500 MG/1
500 TABLET ORAL 2 TIMES DAILY WITH MEALS
Qty: 30 TABLET | Refills: 0 | Status: SHIPPED | OUTPATIENT
Start: 2023-05-19

## 2023-05-19 NOTE — PROGRESS NOTES
Assessment/Plan: March CT of the spine showed L2 superior endplate compression fracture with mild height loss  Incidental finding partially imaged 1 cm right lower lobe subpleural pulmonary nodule  The plan is for a dedicated CT of the chest    Osteoarthritis of the right hip and back pain will be treated with Naprosyn 500 mg twice daily    Mixed connective tissue disease is treated with Plaquenil  Problem List Items Addressed This Visit    None  Visit Diagnoses     Compression fracture of L2 vertebra, initial encounter ([facility] )               Diagnoses and all orders for this visit:    Compression fracture of L2 vertebra, initial encounter ([place] [address] )        No problem-specific Assessment & Plan notes found for this encounter  PHQ-2/9 Depression Screening            Body mass index is 33 82 kg/m²  BMI Counseling: Body mass index is 33 82 kg/m²  The BMI     Subjective:      Patient ID: Karina Gaytan is a 75 y o  female  Patient presents to go over CAT scan of the spine performed in March at the emergency room  The following portions of the patient's history were reviewed and updated as appropriate:   She has a past medical history of Breast cancer ([facility] ) (03/01/2017) and History of radiation therapy (08/01/2017)  ,  does not have any pertinent problems on file  ,   has a past surgical history that includes Cholecystectomy; Colonoscopy; Esophagogastroduodenoscopy; pr colonoscopy flx dx w/collj spec when pfrmd (N/A, 10/23/2017); Breast surgery; Breast lumpectomy (Right, 03/01/2017); Breast biopsy (Left, 1979); and Breast excisional biopsy (Right, 04/2017)  ,  family history includes BRCA1 Negative in her sister; BRCA2 Negative in her sister; Breast cancer (age of onset: 48) in her cousin; Breast cancer (age of onset: 64) in her sister; Breast cancer (age of onset: 76) in her maternal grandmother; Uterine cancer in her sister  ,   reports that she has never smoked   She has never used smokeless tobacco  She reports that she does not currently use alcohol  She reports that she does not use drugs  ,  is allergic to bactrim [sulfamethoxazole-trimethoprim], other, molds & smuts, pollen extract, and wound dressings     Current Outpatient Medications   Medication Sig Dispense Refill   • hydroxychloroquine (PLAQUENIL) 200 mg tablet Take 200 mg by mouth daily  0   • azelastine (ASTELIN) 0 1 % nasal spray 1 spray into each nostril daily Use in each nostril as directed     • Calcium Ascorbate 500 MG TABS Take 500 mg by mouth 2 (two) times a day      • co-enzyme Q-10 30 MG capsule Take 30 mg by mouth daily     • colestipol (COLESTID) 1 g tablet Take 1 tablet (1 g total) by mouth daily (Patient not taking: Reported on 1/6/2023) 90 tablet 2   • DULoxetine (CYMBALTA) 20 mg capsule Take 1 capsule (20 mg total) by mouth daily Patient states bottle says 30mg 90 capsule 1   • ezetimibe (ZETIA) 10 mg tablet take 1 tablet by mouth once daily 90 tablet 3   • fluocinonide (LIDEX) 0 05 % external solution Apply topically 2 (two) times a day (Patient not taking: Reported on 3/21/2023)     • fluticasone (FLONASE) 50 mcg/act nasal spray 2 sprays into each nostril daily     • loperamide (IMODIUM) 2 mg/15 mL oral liquid      • loratadine (CLARITIN) 10 mg tablet Take 1 tablet (10 mg total) by mouth daily (Patient not taking: Reported on 1/6/2023) 30 tablet 0   • Multiple Vitamin (MULTI-VITAMIN DAILY PO) Take 500 mg by mouth daily      • naproxen (Naprosyn) 500 mg tablet Take 1 tablet (500 mg total) by mouth 2 (two) times a day with meals 30 tablet 0   • timolol (TIMOPTIC) 0 5 % ophthalmic solution Administer 1 drop to both eyes every 12 (twelve) hours       No current facility-administered medications for this visit  Review of Systems   Constitutional: Negative for chills and fever  HENT: Negative for ear pain and sore throat  Eyes: Negative for pain and visual disturbance  Respiratory: Negative for cough and shortness of breath  "  Cardiovascular: Negative for chest pain and palpitations  Gastrointestinal: Negative for abdominal pain and vomiting  Genitourinary: Negative for dysuria and hematuria  Musculoskeletal: Positive for back pain  Negative for arthralgias  Right hip pain   Skin: Negative for color change and rash  Neurological: Negative for seizures and syncope  All other systems reviewed and are negative  Objective:    /84   Pulse 76   Temp 97 7 °F (36 5 °C)   Resp 20   Ht 5' 1\" (1 549 m)   Wt 81 2 kg (179 lb)   LMP  (LMP Unknown)   BMI 33 82 kg/m²   Body mass index is 33 82 kg/m²  Physical Exam  Constitutional:       Appearance: Normal appearance  She is well-developed  HENT:      Head: Normocephalic and atraumatic  Right Ear: Tympanic membrane, ear canal and external ear normal       Left Ear: Tympanic membrane, ear canal and external ear normal       Nose: Nose normal       Mouth/Throat:      Mouth: Mucous membranes are moist       Pharynx: Oropharynx is clear  Eyes:      Extraocular Movements: Extraocular movements intact  Conjunctiva/sclera: Conjunctivae normal       Pupils: Pupils are equal, round, and reactive to light  Cardiovascular:      Rate and Rhythm: Normal rate and regular rhythm  Pulses: Normal pulses  Heart sounds: Normal heart sounds  Pulmonary:      Effort: Pulmonary effort is normal       Breath sounds: Normal breath sounds  Abdominal:      General: Abdomen is flat  Bowel sounds are normal       Palpations: Abdomen is soft  Tenderness: There is no abdominal tenderness  Musculoskeletal:         General: Normal range of motion  Cervical back: Normal range of motion and neck supple  Comments: Right-sided SI tenderness   Skin:     General: Skin is warm and dry  Capillary Refill: Capillary refill takes less than 2 seconds  Neurological:      General: No focal deficit present        Mental Status: She is alert and oriented to " person, place, and time  Psychiatric:         Mood and Affect: Mood normal          Behavior: Behavior normal          Thought Content:  Thought content normal          Judgment: Judgment normal

## 2023-05-21 ENCOUNTER — APPOINTMENT (OUTPATIENT)
Dept: LAB | Facility: HOSPITAL | Age: 68
End: 2023-05-21

## 2023-05-21 DIAGNOSIS — M81.0 OSTEOPOROSIS, UNSPECIFIED OSTEOPOROSIS TYPE, UNSPECIFIED PATHOLOGICAL FRACTURE PRESENCE: ICD-10-CM

## 2023-05-21 DIAGNOSIS — M32.9 LUPUS (HCC): ICD-10-CM

## 2023-05-21 DIAGNOSIS — E55.9 VITAMIN D DEFICIENCY: ICD-10-CM

## 2023-05-21 LAB
25(OH)D3 SERPL-MCNC: 18.6 NG/ML (ref 30–100)
ANION GAP SERPL CALCULATED.3IONS-SCNC: 7 MMOL/L (ref 4–13)
BUN SERPL-MCNC: 13 MG/DL (ref 5–25)
C3 SERPL-MCNC: 149 MG/DL (ref 90–180)
C4 SERPL-MCNC: 41 MG/DL (ref 10–40)
CALCIUM SERPL-MCNC: 9.3 MG/DL (ref 8.4–10.2)
CHLORIDE SERPL-SCNC: 104 MMOL/L (ref 96–108)
CO2 SERPL-SCNC: 30 MMOL/L (ref 21–32)
CREAT SERPL-MCNC: 0.94 MG/DL (ref 0.6–1.3)
GFR SERPL CREATININE-BSD FRML MDRD: 62 ML/MIN/1.73SQ M
GLUCOSE P FAST SERPL-MCNC: 107 MG/DL (ref 65–99)
POTASSIUM SERPL-SCNC: 3.6 MMOL/L (ref 3.5–5.3)
PTH-INTACT SERPL-MCNC: 130.5 PG/ML (ref 12–88)
SODIUM SERPL-SCNC: 141 MMOL/L (ref 135–147)

## 2023-05-22 ENCOUNTER — APPOINTMENT (OUTPATIENT)
Dept: LAB | Facility: HOSPITAL | Age: 68
End: 2023-05-22

## 2023-05-30 ENCOUNTER — HOSPITAL ENCOUNTER (OUTPATIENT)
Dept: CT IMAGING | Facility: HOSPITAL | Age: 68
Discharge: HOME | End: 2023-05-30
Attending: FAMILY MEDICINE

## 2023-05-30 DIAGNOSIS — R91.1 PULMONARY NODULE: ICD-10-CM

## 2023-06-09 ENCOUNTER — RA CDI HCC (OUTPATIENT)
Dept: OTHER | Facility: HOSPITAL | Age: 68
End: 2023-06-09

## 2023-06-09 NOTE — PROGRESS NOTES
[address] [address]  coding opportunities       Chart reviewed, no opportunity found: CHART REVIEWED, NO OPPORTUNITY FOUND        Patients Insurance     Medicare Insurance: Medicare

## 2023-06-20 ENCOUNTER — OFFICE VISIT (OUTPATIENT)
Dept: FAMILY MEDICINE CLINIC | Facility: CLINIC | Age: 68
End: 2023-06-20
Payer: MEDICARE

## 2023-06-20 VITALS
BODY MASS INDEX: 33.61 KG/M2 | RESPIRATION RATE: 20 BRPM | HEART RATE: 84 BPM | SYSTOLIC BLOOD PRESSURE: 134 MMHG | DIASTOLIC BLOOD PRESSURE: 76 MMHG | WEIGHT: 178 LBS | TEMPERATURE: 98.1 F | HEIGHT: 61 IN

## 2023-06-20 DIAGNOSIS — M35.1 MIXED CONNECTIVE TISSUE DISEASE (HCC): ICD-10-CM

## 2023-06-20 DIAGNOSIS — M32.9 LUPUS (HCC): ICD-10-CM

## 2023-06-20 DIAGNOSIS — R29.6 MULTIPLE FALLS: Primary | ICD-10-CM

## 2023-06-20 DIAGNOSIS — S32.020A COMPRESSION FRACTURE OF L2 VERTEBRA, INITIAL ENCOUNTER (HCC): ICD-10-CM

## 2023-06-20 DIAGNOSIS — E78.5 HYPERLIPIDEMIA, UNSPECIFIED HYPERLIPIDEMIA TYPE: ICD-10-CM

## 2023-06-20 DIAGNOSIS — E55.9 VITAMIN D DEFICIENCY: ICD-10-CM

## 2023-06-20 DIAGNOSIS — N18.31 STAGE 3A CHRONIC KIDNEY DISEASE (HCC): ICD-10-CM

## 2023-06-20 DIAGNOSIS — R13.19 ESOPHAGEAL DYSPHAGIA: ICD-10-CM

## 2023-06-20 DIAGNOSIS — F32.A DEPRESSION, UNSPECIFIED DEPRESSION TYPE: ICD-10-CM

## 2023-06-20 DIAGNOSIS — Z85.3 HISTORY OF BREAST CANCER: ICD-10-CM

## 2023-06-20 DIAGNOSIS — R26.89 BALANCE PROBLEM: ICD-10-CM

## 2023-06-20 DIAGNOSIS — E21.3 HYPERPARATHYROIDISM (HCC): ICD-10-CM

## 2023-06-20 PROCEDURE — 99215 OFFICE O/P EST HI 40 MIN: CPT | Performed by: FAMILY MEDICINE

## 2023-06-20 RX ORDER — NAPROXEN 500 MG/1
500 TABLET ORAL 2 TIMES DAILY WITH MEALS
Qty: 30 TABLET | Refills: 5 | Status: SHIPPED | OUTPATIENT
Start: 2023-06-20

## 2023-06-20 NOTE — PROGRESS NOTES
Assessment/Plan: Multiple falls with a balance problem will refer to physical therapy for gait training    History of compression fracture of L2 has completed a course of physical therapy    Uses naproxen on a as needed basis for pain relief    Mixed connective tissue disorder lupus followed by rheumatology    Osteoporosis receives Prolia from rheumatology    History of breast cancer currently in remission last mammogram was performed at [facility] February 10, 2022    Reactive depression treated with Cymbalta 30 mg daily    Vitamin D deficiency currently on vitamin D replacement at 50,000 units weekly for 3 months then 1000 units daily    Chronic kidney disease stage IIIa the patient was advised to minimize use of nonsteroidal anti-inflammatories such as Naprosyn    Esophageal dysphagia with history of esophageal dilatation will refer to gastroenterology the patient currently is having issues with difficulty swallowing    Hyperlipidemia  Lipid panel December 22 total cholesterol 201 triglycerides of 95 HDL of 85 and LDL 97    Hyperparathyroidism of renal origin with a parathyroid hormone level of 30 5    Problem List Items Addressed This Visit        Genitourinary    Stage 3 chronic kidney disease ([facility] )    Relevant Orders    Ambulatory Referral to Physical Therapy    Albumin / creatinine urine ratio       Other    Mixed connective tissue disease ([facility] )    Relevant Orders    Ambulatory Referral to Physical Therapy    CBC and differential    Comprehensive metabolic panel    Comprehensive metabolic panel    Hyperlipidemia    Relevant Orders    CBC and differential    Comprehensive metabolic panel    Lipid Panel with Direct LDL reflex    Vitamin D deficiency    Relevant Orders    Vitamin D 25 hydroxy    Vitamin D 25 hydroxy    Lupus (HCC)    Relevant Medications    naproxen (Naprosyn) 500 mg tablet    Other Relevant Orders    Ambulatory Referral to Physical Therapy   Other Visit Diagnoses     Multiple falls    - Primary    Relevant Orders    Ambulatory Referral to Physical Therapy    Balance problem        Relevant Orders    Ambulatory Referral to Physical Therapy    Compression fracture of L2 vertebra, initial encounter ([place] [address] )        Relevant Medications    naproxen (Naprosyn) 500 mg tablet    Other Relevant Orders    Ambulatory Referral to Physical Therapy    History of breast cancer        Relevant Orders    Ambulatory Referral to Physical Therapy    Depression, unspecified depression type        Relevant Orders    Ambulatory Referral to Physical Therapy    Esophageal dysphagia        Relevant Orders    Ambulatory Referral to Gastroenterology    Hyperparathyroidism [facility])               Diagnoses and all orders for this visit:    Multiple falls  -     Ambulatory Referral to Physical Therapy; Future    Balance problem  -     Ambulatory Referral to Physical Therapy; Future    Compression fracture of L2 vertebra, initial encounter ([facility])  -     naproxen (Naprosyn) 500 mg tablet; Take 1 tablet (500 mg total) by mouth 2 (two) times a day with meals  -     Ambulatory Referral to Physical Therapy; Future    Mixed connective tissue disease ([place] [address] )  -     Ambulatory Referral to Physical Therapy; Future  -     CBC and differential; Future  -     Comprehensive metabolic panel; Future  -     Comprehensive metabolic panel; Future    Lupus ([place] [address] )  -     Ambulatory Referral to Physical Therapy; Future    History of breast cancer  -     Ambulatory Referral to Physical Therapy; Future    Stage 3a chronic kidney disease ([place] [address] )  -     Ambulatory Referral to Physical Therapy; Future  -     Albumin / creatinine urine ratio    Depression, unspecified depression type  -     Ambulatory Referral to Physical Therapy; Future    Vitamin D deficiency  -     Vitamin D 25 hydroxy; Future  -     Vitamin D 25 hydroxy; Future    Esophageal dysphagia  -     Ambulatory Referral to Gastroenterology;  Future    Hyperlipidemia, unspecified hyperlipidemia type  - CBC and differential; Future  -     Comprehensive metabolic panel; Future  -     Lipid Panel with Direct LDL reflex; Future    Hyperparathyroidism ([facility] )        No problem-specific Assessment & Plan notes found for this encounter  PHQ-2/9 Depression Screening            Body mass index is 33 63 kg/m²  BMI Counseling: Body mass index is 33 63 kg/m²  The BMI     Subjective:      Patient ID: Ashtyn Colvin is a 75 y o  female  Patient presents for checkup stating that she has had another fall and has difficulty with her balance and gait      The following portions of the patient's history were reviewed and updated as appropriate:   She has a past medical history of Breast cancer ([facility] ) (03/01/2017) and History of radiation therapy (08/01/2017)  ,  does not have any pertinent problems on file  ,   has a past surgical history that includes Cholecystectomy; Colonoscopy; Esophagogastroduodenoscopy; pr colonoscopy flx dx w/collj spec when pfrmd (N/A, 10/23/2017); Breast surgery; Breast lumpectomy (Right, 03/01/2017); Breast biopsy (Left, 1979); and Breast excisional biopsy (Right, 04/2017)  ,  family history includes BRCA1 Negative in her sister; BRCA2 Negative in her sister; Breast cancer (age of onset: 48) in her cousin; Breast cancer (age of onset: 64) in her sister; Breast cancer (age of onset: 76) in her maternal grandmother; Uterine cancer in her sister  ,   reports that she has never smoked  She has never used smokeless tobacco  She reports that she does not currently use alcohol  She reports that she does not use drugs  ,  is allergic to bactrim [sulfamethoxazole-trimethoprim], other, molds & smuts, pollen extract, and wound dressings     Current Outpatient Medications   Medication Sig Dispense Refill   • naproxen (Naprosyn) 500 mg tablet Take 1 tablet (500 mg total) by mouth 2 (two) times a day with meals 30 tablet 5   • azelastine (ASTELIN) 0 1 % nasal spray 1 spray into each nostril daily Use in each nostril as directed     • Calcium Ascorbate 500 MG TABS Take 500 mg by mouth 2 (two) times a day      • co-enzyme Q-10 30 MG capsule Take 30 mg by mouth daily     • colestipol (COLESTID) 1 g tablet Take 1 tablet (1 g total) by mouth daily (Patient not taking: Reported on 1/6/2023) 90 tablet 2   • DULoxetine (CYMBALTA) 20 mg capsule Take 1 capsule (20 mg total) by mouth daily Patient states bottle says 30mg 90 capsule 1   • ezetimibe (ZETIA) 10 mg tablet take 1 tablet by mouth once daily 90 tablet 3   • fluocinonide (LIDEX) 0 05 % external solution Apply topically 2 (two) times a day (Patient not taking: Reported on 3/21/2023)     • fluticasone (FLONASE) 50 mcg/act nasal spray 2 sprays into each nostril daily     • hydroxychloroquine (PLAQUENIL) 200 mg tablet Take 200 mg by mouth daily  0   • loperamide (IMODIUM) 2 mg/15 mL oral liquid      • loratadine (CLARITIN) 10 mg tablet Take 1 tablet (10 mg total) by mouth daily (Patient not taking: Reported on 1/6/2023) 30 tablet 0   • Multiple Vitamin (MULTI-VITAMIN DAILY PO) Take 500 mg by mouth daily      • timolol (TIMOPTIC) 0 5 % ophthalmic solution Administer 1 drop to both eyes every 12 (twelve) hours       No current facility-administered medications for this visit  Review of Systems   Constitutional: Positive for fatigue  Negative for chills and fever  HENT: Negative for ear pain and sore throat  Eyes: Negative for pain and visual disturbance  Respiratory: Positive for shortness of breath  Negative for cough  Cardiovascular: Negative for chest pain and palpitations  Gastrointestinal: Negative for abdominal pain and vomiting  Genitourinary: Positive for decreased urine volume and difficulty urinating  Negative for dysuria and hematuria  Musculoskeletal: Positive for arthralgias and gait problem  Negative for back pain  Skin: Negative for color change and rash  Neurological: Positive for weakness  Negative for seizures and syncope  "  Psychiatric/Behavioral: Positive for dysphoric mood  All other systems reviewed and are negative  Objective:    /76   Pulse 84   Temp 98 1 °F (36 7 °C)   Resp 20   Ht 5' 1\" (1 549 m)   Wt 80 7 kg (178 lb)   LMP  (LMP Unknown)   BMI 33 63 kg/m²   Body mass index is 33 63 kg/m²  Physical Exam  Constitutional:       Appearance: She is well-developed  She is obese  HENT:      Head: Normocephalic and atraumatic  Right Ear: Tympanic membrane, ear canal and external ear normal       Left Ear: Tympanic membrane, ear canal and external ear normal       Nose: Nose normal       Mouth/Throat:      Mouth: Mucous membranes are moist       Pharynx: Oropharynx is clear  Eyes:      Extraocular Movements: Extraocular movements intact  Conjunctiva/sclera: Conjunctivae normal       Pupils: Pupils are equal, round, and reactive to light  Cardiovascular:      Rate and Rhythm: Normal rate and regular rhythm  Pulses: Normal pulses  Heart sounds: Normal heart sounds  Pulmonary:      Effort: Pulmonary effort is normal       Breath sounds: Normal breath sounds  Abdominal:      General: Abdomen is flat  Bowel sounds are normal       Palpations: Abdomen is soft  Tenderness: There is no abdominal tenderness  Musculoskeletal:         General: Normal range of motion  Cervical back: Normal range of motion and neck supple  Comments: Joint deformities   Skin:     General: Skin is warm and dry  Capillary Refill: Capillary refill takes less than 2 seconds  Neurological:      General: No focal deficit present  Mental Status: She is alert and oriented to person, place, and time  Psychiatric:         Mood and Affect: Mood normal          Behavior: Behavior normal          Thought Content:  Thought content normal          Judgment: Judgment normal            "

## 2023-06-28 DIAGNOSIS — M35.1 MIXED CONNECTIVE TISSUE DISEASE (HCC): ICD-10-CM

## 2023-07-03 DIAGNOSIS — E78.5 HYPERLIPIDEMIA, UNSPECIFIED HYPERLIPIDEMIA TYPE: ICD-10-CM

## 2023-07-03 RX ORDER — EZETIMIBE 10 MG/1
10 TABLET ORAL DAILY
Qty: 90 TABLET | Refills: 3 | Status: SHIPPED | OUTPATIENT
Start: 2023-07-03

## 2023-07-03 RX ORDER — DULOXETIN HYDROCHLORIDE 20 MG/1
CAPSULE, DELAYED RELEASE ORAL
Qty: 90 CAPSULE | Refills: 1 | Status: SHIPPED | OUTPATIENT
Start: 2023-07-03 | End: 2023-07-05 | Stop reason: SDUPTHER

## 2023-07-05 DIAGNOSIS — M35.1 MIXED CONNECTIVE TISSUE DISEASE (HCC): ICD-10-CM

## 2023-07-05 DIAGNOSIS — S32.020A COMPRESSION FRACTURE OF L2 VERTEBRA, INITIAL ENCOUNTER (HCC): ICD-10-CM

## 2023-07-05 RX ORDER — NAPROXEN 500 MG/1
500 TABLET ORAL 2 TIMES DAILY WITH MEALS
Qty: 180 TABLET | Refills: 1 | Status: SHIPPED | OUTPATIENT
Start: 2023-07-05

## 2023-07-05 RX ORDER — DULOXETIN HYDROCHLORIDE 20 MG/1
20 CAPSULE, DELAYED RELEASE ORAL DAILY
Qty: 90 CAPSULE | Refills: 1 | Status: SHIPPED | OUTPATIENT
Start: 2023-07-05

## 2023-07-05 NOTE — TELEPHONE ENCOUNTER
Spoke to  to confirm patient wants Rx refill to [facility]; both medications previously filled at [place].

## 2023-07-18 ENCOUNTER — OFFICE VISIT (OUTPATIENT)
Dept: NEPHROLOGY | Facility: CLINIC | Age: 68
End: 2023-07-18
Payer: MEDICARE

## 2023-07-18 VITALS
SYSTOLIC BLOOD PRESSURE: 142 MMHG | BODY MASS INDEX: 33.79 KG/M2 | HEART RATE: 77 BPM | OXYGEN SATURATION: 97 % | HEIGHT: 61 IN | WEIGHT: 179 LBS | DIASTOLIC BLOOD PRESSURE: 78 MMHG

## 2023-07-18 DIAGNOSIS — M32.9 LUPUS (HCC): ICD-10-CM

## 2023-07-18 DIAGNOSIS — N25.81 SECONDARY HYPERPARATHYROIDISM (HCC): Primary | ICD-10-CM

## 2023-07-18 DIAGNOSIS — N18.31 STAGE 3A CHRONIC KIDNEY DISEASE (HCC): ICD-10-CM

## 2023-07-18 DIAGNOSIS — R32 URINARY INCONTINENCE: ICD-10-CM

## 2023-07-18 DIAGNOSIS — E55.9 VITAMIN D DEFICIENCY: ICD-10-CM

## 2023-07-18 PROCEDURE — 99214 OFFICE O/P EST MOD 30 MIN: CPT | Performed by: INTERNAL MEDICINE

## 2023-07-18 NOTE — PATIENT INSTRUCTIONS
Encourage fluid intake 8-16 ounces per day. You have stable stage 3 kidney disease. Your kidney function is 62%. Your vitamin d level is being addressed by rheumatologist. Kenzie Rothman are on prolia as well. Your parathyroid is high due to vitamin d deficiency likely. Follow up in 6 months.

## 2023-07-19 ENCOUNTER — HOSPITAL ENCOUNTER (OUTPATIENT)
Dept: BONE DENSITY | Facility: HOSPITAL | Age: 68
Discharge: HOME | End: 2023-07-19
Payer: MEDICARE

## 2023-07-19 VITALS — HEIGHT: 60 IN | WEIGHT: 179 LBS | BODY MASS INDEX: 35.14 KG/M2

## 2023-07-19 DIAGNOSIS — Q78.2 OSTEOPETROSIS: ICD-10-CM

## 2023-07-19 PROCEDURE — 77080 DXA BONE DENSITY AXIAL: CPT

## 2023-08-16 ENCOUNTER — APPOINTMENT (OUTPATIENT)
Dept: LAB | Facility: HOSPITAL | Age: 68
End: 2023-08-16
Payer: MEDICARE

## 2023-08-16 DIAGNOSIS — E34.9 ELEVATED PARATHYROID HORMONE: ICD-10-CM

## 2023-08-16 DIAGNOSIS — E55.9 VITAMIN D DEFICIENCY DISEASE: ICD-10-CM

## 2023-08-16 LAB
25(OH)D3 SERPL-MCNC: 32.2 NG/ML (ref 30–100)
CALCIUM SERPL-MCNC: 9 MG/DL (ref 8.4–10.2)
PTH-INTACT SERPL-MCNC: 128.6 PG/ML (ref 12–88)

## 2023-08-16 PROCEDURE — 36415 COLL VENOUS BLD VENIPUNCTURE: CPT

## 2023-08-16 PROCEDURE — 83970 ASSAY OF PARATHORMONE: CPT

## 2023-08-16 PROCEDURE — 82306 VITAMIN D 25 HYDROXY: CPT

## 2023-08-16 PROCEDURE — 82310 ASSAY OF CALCIUM: CPT

## 2023-12-03 ENCOUNTER — APPOINTMENT (OUTPATIENT)
Dept: LAB | Facility: HOSPITAL | Age: 68
End: 2023-12-03
Payer: MEDICARE

## 2023-12-03 DIAGNOSIS — M35.1 MIXED CONNECTIVE TISSUE DISEASE (HCC): ICD-10-CM

## 2023-12-03 DIAGNOSIS — R79.89 ELEVATED PARATHYROID HORMONE: ICD-10-CM

## 2023-12-03 DIAGNOSIS — N18.31 STAGE 3A CHRONIC KIDNEY DISEASE (HCC): ICD-10-CM

## 2023-12-03 DIAGNOSIS — E55.9 VITAMIN D DEFICIENCY: ICD-10-CM

## 2023-12-03 DIAGNOSIS — E78.5 HYPERLIPIDEMIA, UNSPECIFIED HYPERLIPIDEMIA TYPE: ICD-10-CM

## 2023-12-03 LAB
25(OH)D3 SERPL-MCNC: 25.4 NG/ML (ref 30–100)
ALBUMIN SERPL BCP-MCNC: 4.1 G/DL (ref 3.5–5)
ALP SERPL-CCNC: 50 U/L (ref 34–104)
ALT SERPL W P-5'-P-CCNC: 12 U/L (ref 7–52)
ANION GAP SERPL CALCULATED.3IONS-SCNC: 8 MMOL/L
AST SERPL W P-5'-P-CCNC: 15 U/L (ref 13–39)
BASOPHILS # BLD AUTO: 0.04 THOUSANDS/ÂΜL (ref 0–0.1)
BASOPHILS NFR BLD AUTO: 1 % (ref 0–1)
BILIRUB SERPL-MCNC: 0.92 MG/DL (ref 0.2–1)
BUN SERPL-MCNC: 15 MG/DL (ref 5–25)
CALCIUM SERPL-MCNC: 9.4 MG/DL (ref 8.4–10.2)
CHLORIDE SERPL-SCNC: 107 MMOL/L (ref 96–108)
CHOLEST SERPL-MCNC: 205 MG/DL
CO2 SERPL-SCNC: 26 MMOL/L (ref 21–32)
CREAT SERPL-MCNC: 1.04 MG/DL (ref 0.6–1.3)
EOSINOPHIL # BLD AUTO: 0.12 THOUSAND/ÂΜL (ref 0–0.61)
EOSINOPHIL NFR BLD AUTO: 2 % (ref 0–6)
ERYTHROCYTE [DISTWIDTH] IN BLOOD BY AUTOMATED COUNT: 13.3 % (ref 11.6–15.1)
GFR SERPL CREATININE-BSD FRML MDRD: 54 ML/MIN/1.73SQ M
GLUCOSE P FAST SERPL-MCNC: 126 MG/DL (ref 65–99)
HCT VFR BLD AUTO: 44.7 % (ref 34.8–46.1)
HDLC SERPL-MCNC: 69 MG/DL
HGB BLD-MCNC: 14 G/DL (ref 11.5–15.4)
IMM GRANULOCYTES # BLD AUTO: 0.01 THOUSAND/UL (ref 0–0.2)
IMM GRANULOCYTES NFR BLD AUTO: 0 % (ref 0–2)
LDLC SERPL CALC-MCNC: 113 MG/DL (ref 0–100)
LYMPHOCYTES # BLD AUTO: 1.37 THOUSANDS/ÂΜL (ref 0.6–4.47)
LYMPHOCYTES NFR BLD AUTO: 25 % (ref 14–44)
MAGNESIUM SERPL-MCNC: 2.1 MG/DL (ref 1.9–2.7)
MCH RBC QN AUTO: 29 PG (ref 26.8–34.3)
MCHC RBC AUTO-ENTMCNC: 31.3 G/DL (ref 31.4–37.4)
MCV RBC AUTO: 93 FL (ref 82–98)
MONOCYTES # BLD AUTO: 0.53 THOUSAND/ÂΜL (ref 0.17–1.22)
MONOCYTES NFR BLD AUTO: 10 % (ref 4–12)
NEUTROPHILS # BLD AUTO: 3.47 THOUSANDS/ÂΜL (ref 1.85–7.62)
NEUTS SEG NFR BLD AUTO: 62 % (ref 43–75)
NRBC BLD AUTO-RTO: 0 /100 WBCS
PHOSPHATE SERPL-MCNC: 3.7 MG/DL (ref 2.3–4.1)
PLATELET # BLD AUTO: 287 THOUSANDS/UL (ref 149–390)
PMV BLD AUTO: 8.7 FL (ref 8.9–12.7)
POTASSIUM SERPL-SCNC: 3.9 MMOL/L (ref 3.5–5.3)
PROT SERPL-MCNC: 6.9 G/DL (ref 6.4–8.4)
PTH-INTACT SERPL-MCNC: 133.4 PG/ML (ref 12–88)
RBC # BLD AUTO: 4.83 MILLION/UL (ref 3.81–5.12)
SODIUM SERPL-SCNC: 141 MMOL/L (ref 135–147)
TRIGL SERPL-MCNC: 115 MG/DL
URATE SERPL-MCNC: 4.9 MG/DL (ref 2–7.5)
WBC # BLD AUTO: 5.54 THOUSAND/UL (ref 4.31–10.16)

## 2023-12-03 PROCEDURE — 83970 ASSAY OF PARATHORMONE: CPT

## 2023-12-03 PROCEDURE — 85025 COMPLETE CBC W/AUTO DIFF WBC: CPT

## 2023-12-03 PROCEDURE — 80061 LIPID PANEL: CPT

## 2023-12-03 PROCEDURE — 84550 ASSAY OF BLOOD/URIC ACID: CPT

## 2023-12-03 PROCEDURE — 80053 COMPREHEN METABOLIC PANEL: CPT

## 2023-12-03 PROCEDURE — 36415 COLL VENOUS BLD VENIPUNCTURE: CPT

## 2023-12-03 PROCEDURE — 82306 VITAMIN D 25 HYDROXY: CPT

## 2023-12-03 PROCEDURE — 83735 ASSAY OF MAGNESIUM: CPT

## 2023-12-03 PROCEDURE — 84100 ASSAY OF PHOSPHORUS: CPT

## 2023-12-04 ENCOUNTER — APPOINTMENT (OUTPATIENT)
Dept: LAB | Facility: HOSPITAL | Age: 68
End: 2023-12-04
Payer: MEDICARE

## 2023-12-04 LAB
AMORPH URATE CRY URNS QL MICRO: ABNORMAL /HPF
BACTERIA UR QL AUTO: ABNORMAL /HPF
BILIRUB UR QL STRIP: NEGATIVE
CLARITY UR: ABNORMAL
COLOR UR: YELLOW
CREAT UR-MCNC: 148.8 MG/DL
GLUCOSE UR STRIP-MCNC: NEGATIVE MG/DL
HGB UR QL STRIP.AUTO: ABNORMAL
KETONES UR STRIP-MCNC: NEGATIVE MG/DL
LEUKOCYTE ESTERASE UR QL STRIP: NEGATIVE
MICROALBUMIN UR-MCNC: <7 MG/L
MICROALBUMIN/CREAT 24H UR: <5 MG/G CREATININE (ref 0–30)
NITRITE UR QL STRIP: NEGATIVE
NON-SQ EPI CELLS URNS QL MICRO: ABNORMAL /HPF
PH UR STRIP.AUTO: 5.5 [PH]
PROT UR STRIP-MCNC: NEGATIVE MG/DL
RBC #/AREA URNS AUTO: ABNORMAL /HPF
SP GR UR STRIP.AUTO: >=1.03 (ref 1–1.03)
UROBILINOGEN UR QL STRIP.AUTO: 0.2 E.U./DL
WBC #/AREA URNS AUTO: ABNORMAL /HPF

## 2023-12-04 PROCEDURE — 81001 URINALYSIS AUTO W/SCOPE: CPT

## 2024-01-05 ENCOUNTER — OFFICE VISIT (OUTPATIENT)
Dept: FAMILY MEDICINE CLINIC | Facility: CLINIC | Age: 69
End: 2024-01-05
Payer: MEDICARE

## 2024-01-05 VITALS
SYSTOLIC BLOOD PRESSURE: 134 MMHG | HEART RATE: 84 BPM | WEIGHT: 181 LBS | BODY MASS INDEX: 35.53 KG/M2 | RESPIRATION RATE: 20 BRPM | DIASTOLIC BLOOD PRESSURE: 72 MMHG | HEIGHT: 60 IN | TEMPERATURE: 97.9 F

## 2024-01-05 DIAGNOSIS — Z00.00 MEDICARE ANNUAL WELLNESS VISIT, SUBSEQUENT: ICD-10-CM

## 2024-01-05 DIAGNOSIS — E55.9 VITAMIN D DEFICIENCY: ICD-10-CM

## 2024-01-05 DIAGNOSIS — Z85.3 HISTORY OF BREAST CANCER: ICD-10-CM

## 2024-01-05 DIAGNOSIS — S32.020A COMPRESSION FRACTURE OF L2 VERTEBRA, INITIAL ENCOUNTER (HCC): ICD-10-CM

## 2024-01-05 DIAGNOSIS — M35.1 MIXED CONNECTIVE TISSUE DISEASE (HCC): Primary | ICD-10-CM

## 2024-01-05 DIAGNOSIS — G47.30 SLEEP APNEA, UNSPECIFIED TYPE: ICD-10-CM

## 2024-01-05 DIAGNOSIS — M32.9 LUPUS (HCC): ICD-10-CM

## 2024-01-05 DIAGNOSIS — E66.01 OBESITY, MORBID (HCC): ICD-10-CM

## 2024-01-05 DIAGNOSIS — E78.5 HYPERLIPIDEMIA, UNSPECIFIED HYPERLIPIDEMIA TYPE: ICD-10-CM

## 2024-01-05 DIAGNOSIS — F32.A DEPRESSION, UNSPECIFIED DEPRESSION TYPE: ICD-10-CM

## 2024-01-05 DIAGNOSIS — N18.31 STAGE 3A CHRONIC KIDNEY DISEASE (HCC): ICD-10-CM

## 2024-01-05 DIAGNOSIS — N25.81 SECONDARY HYPERPARATHYROIDISM (HCC): ICD-10-CM

## 2024-01-05 PROCEDURE — G0439 PPPS, SUBSEQ VISIT: HCPCS | Performed by: FAMILY MEDICINE

## 2024-01-05 PROCEDURE — 99214 OFFICE O/P EST MOD 30 MIN: CPT | Performed by: FAMILY MEDICINE

## 2024-01-05 RX ORDER — DULOXETIN HYDROCHLORIDE 30 MG/1
30 CAPSULE, DELAYED RELEASE ORAL DAILY
Qty: 90 CAPSULE | Refills: 3 | Status: SHIPPED | OUTPATIENT
Start: 2024-01-05

## 2024-01-05 NOTE — PROGRESS NOTES
Assessment and Plan:Lupus with depression will ratchet up Cymbalta from 20 mg to 30 mg the following month will be 60 mg and then following that 90 mg l    Microscopic colitis with rectal incontinence the patient is unable to tolerate Pepto-Bismol.    Chronic low back pain with a history of a compression fracture of L2 treated with naproxen on a as needed basis    Hyperlipidemia treated with Zetia 10 mg with a lipid panel and a desirable profile      Urinary incontinence patient was advised to try Kegel and wear the incontinence pads    Tree of breast cancer currently in remission    Snoring unrefreshed sleep and obesity with a BMI of 35.35 with daily fatigue will refer to sleep medicine for evaluation    Chronic kidney disease stage IIIa followed by nephrology     Problem List Items Addressed This Visit     Mixed connective tissue disease (HCC) - Primary    Stage 3 chronic kidney disease (HCC)    Hyperlipidemia    Relevant Orders    Lipid Panel with Direct LDL reflex    Vitamin D deficiency    Relevant Orders    Vitamin D 25 hydroxy    Lupus (HCC)    Relevant Orders    CBC and differential    Comprehensive metabolic panel    Obesity, morbid (HCC)    Secondary hyperparathyroidism (HCC)   Other Visit Diagnoses     Medicare annual wellness visit, subsequent        Depression, unspecified depression type        Relevant Medications    DULoxetine (CYMBALTA) 30 mg delayed release capsule    Compression fracture of L2 vertebra, initial encounter ([facility])        History of breast cancer        Sleep apnea, unspecified type        Relevant Orders    Ambulatory Referral to Sleep Medicine            Depression Screening and Follow-up Plan: Patient was screened for depression during today's encounter. They screened negative with a PHQ-2 score of 1.    Falls Plan of Care: balance, strength, and gait training instructions were provided.     Urinary Incontinence Plan of Care: counseling topics discussed: practice Kegel (pelvic  floor strengthening) exercises, use restroom every 2 hours, weight loss and preventing constipation.       Preventive health issues were discussed with patient, and age appropriate screening tests were ordered as noted in patient's After Visit Summary.  Personalized health advice and appropriate referrals for health education or preventive services given if needed, as noted in patient's After Visit Summary.     History of Present Illness:     Patient presents for a Medicare Wellness Visit    Back Pain  Pertinent negatives include no abdominal pain, chest pain, dysuria or fever.      Patient Care Team:  Domingo Rosen DO as PCP - General  Riky Fortune MD as Endoscopist     Review of Systems:     Review of Systems   Constitutional:  Negative for chills and fever.   HENT:  Negative for ear pain and sore throat.    Eyes:  Negative for pain and visual disturbance.   Respiratory:  Negative for cough and shortness of breath.    Cardiovascular:  Negative for chest pain and palpitations.   Gastrointestinal:  Positive for diarrhea. Negative for abdominal pain and vomiting.   Genitourinary:  Negative for dysuria and hematuria.   Musculoskeletal:  Positive for arthralgias and back pain.   Skin:  Negative for color change and rash.   Neurological:  Negative for seizures and syncope.   Psychiatric/Behavioral:  Positive for dysphoric mood.    All other systems reviewed and are negative.       Problem List:     Patient Active Problem List   Diagnosis   • Change in bowel habits   • Microscopic hematuria   • Mixed connective tissue disease (HCC)   • Stage 3 chronic kidney disease (HCC)   • Class 1 obesity   • Ataxia   • Hyperlipidemia   • Malignant neoplasm of female breast, unspecified estrogen receptor status, unspecified laterality, unspecified site of breast (HCC)   • Vitamin D deficiency   • Difficulty urinating   • Lupus (HCC)   • Obesity, morbid (HCC)   • Secondary hyperparathyroidism (HCC)      Past Medical and  Surgical History:     Past Medical History:   Diagnosis Date   • Breast cancer (HCC) 03/01/2017    right breast intraductal carcinoma performed at [facility]   • History of radiation therapy 08/01/2017    right breast     Past Surgical History:   Procedure Laterality Date   • BREAST BIOPSY Left 1979    benign   • BREAST EXCISIONAL BIOPSY Right 04/2017    intraductal carcinoma needle loc performed at [facility]   • BREAST LUMPECTOMY Right 03/01/2017   • BREAST SURGERY     • CHOLECYSTECTOMY     • COLONOSCOPY     • ESOPHAGOGASTRODUODENOSCOPY     • MO COLONOSCOPY FLX DX W/COLLJ SPEC WHEN PFRMD N/A 10/23/2017    Procedure: COLONOSCOPY;  Surgeon: Riky Fortune MD;  Location: [facility];  Service: Colorectal      Family History:     Family History   Problem Relation Age of Onset   • Breast cancer Sister 56   • Uterine cancer Sister    • BRCA1 Negative Sister    • BRCA2 Negative Sister    • Breast cancer Maternal Grandmother 75   • Breast cancer Cousin 50      Social History:     Social History     Socioeconomic History   • Marital status: /Civil Union     Spouse name: None   • Number of children: None   • Years of education: None   • Highest education level: None   Occupational History   • None   Tobacco Use   • Smoking status: Never   • Smokeless tobacco: Never   Vaping Use   • Vaping status: Never Used   Substance and Sexual Activity   • Alcohol use: Not Currently     Comment: rarely   • Drug use: No   • Sexual activity: None   Other Topics Concern   • None   Social History Narrative   • None     Social Determinants of Health     Financial Resource Strain: Low Risk  (1/5/2024)    Overall Financial Resource Strain (CARDIA)    • Difficulty of Paying Living Expenses: Not hard at all   Food Insecurity: Not on file   Transportation Needs: No Transportation Needs (1/5/2024)    PRAPARE - Transportation    • Lack of Transportation (Medical): No    • Lack of Transportation (Non-Medical): No   Physical Activity: Not on file    Stress: Not on file   Social Connections: Not on file   Intimate Partner Violence: Not on file   Housing Stability: Not on file      Medications and Allergies:     Current Outpatient Medications   Medication Sig Dispense Refill   • Cholecalciferol 125 MCG (5000 UT) capsule Take 5,000 Units by mouth daily     • DULoxetine (CYMBALTA) 30 mg delayed release capsule Take 1 capsule (30 mg total) by mouth daily 90 capsule 3   • azelastine (ASTELIN) 0.1 % nasal spray 1 spray into each nostril daily Use in each nostril as directed     • Calcium Ascorbate 500 MG TABS Take 500 mg by mouth 2 (two) times a day      • co-enzyme Q-10 30 MG capsule Take 30 mg by mouth daily     • ezetimibe (ZETIA) 10 mg tablet Take 1 tablet (10 mg total) by mouth daily 90 tablet 3   • fluticasone (FLONASE) 50 mcg/act nasal spray 2 sprays into each nostril daily     • hydroxychloroquine (PLAQUENIL) 200 mg tablet Take 200 mg by mouth daily  0   • loperamide (IMODIUM) 2 mg/15 mL oral liquid      • Multiple Vitamin (MULTI-VITAMIN DAILY PO) Take 500 mg by mouth daily      • naproxen (Naprosyn) 500 mg tablet Take 1 tablet (500 mg total) by mouth 2 (two) times a day with meals 180 tablet 1   • timolol (TIMOPTIC) 0.5 % ophthalmic solution Administer 1 drop to both eyes every 12 (twelve) hours       No current facility-administered medications for this visit.     Allergies   Allergen Reactions   • Bactrim [Sulfamethoxazole-Trimethoprim] Anaphylaxis and Swelling     Swelling of throat and nose; itching   • Other      clear tape _blisters    Peaches, potatoes, apples- per allergy testing per pt   • Molds & Smuts    • Pollen Extract Itching   • Wound Dressings Rash      Immunizations:     Immunization History   Administered Date(s) Administered   • COVID-19 MODERNA VACC 0.5 ML IM 03/24/2021, 04/21/2021, 11/09/2021   • INFLUENZA 09/28/2020, 10/31/2022, 10/31/2023   • Influenza, high dose seasonal 0.7 mL 12/21/2021      Health Maintenance:         Topic Date  Due   • Breast Cancer Screening: Mammogram  02/10/2023   • Colorectal Cancer Screening  08/26/2031   • Hepatitis C Screening  Completed         Topic Date Due   • Pneumococcal Vaccine: 65+ Years (1 - PCV) Never done   • COVID-19 Vaccine (4 - 2023-24 season) 09/01/2023      Medicare Screening Tests and Risk Assessments:         Health Risk Assessment:   Patient rates overall health as good. Patient feels that their physical health rating is slightly worse. Eyesight was rated as same. Hearing was rated as same. Patient feels that their emotional and mental health rating is same. Patients states they are never, rarely angry. Patient states they are sometimes unusually tired/fatigued. Pain experienced in the last 7 days has been none. Patient states that she has experienced no weight loss or gain in last 6 months.     Depression Screening:   PHQ-2 Score: 1      Fall Risk Screening:   In the past year, patient has experienced: no history of falling in past year      Urinary Incontinence Screening:   Patient has leaked urine accidently in the last six months.     Home Safety:  Patient does not have trouble with stairs inside or outside of their home.     Nutrition:   Current diet is Regular.     Medications:   Patient is currently taking over-the-counter supplements. OTC medications include: see medication list. Patient is able to manage medications.     Activities of Daily Living (ADLs)/Instrumental Activities of Daily Living (IADLs):   Walk and transfer into and out of bed and chair?: Yes  Dress and groom yourself?: Yes    Bathe or shower yourself?: Yes    Feed yourself? Yes  Do your laundry/housekeeping?: Yes  Manage your money, pay your bills and track your expenses?: Yes  Make your own meals?: Yes    Do your own shopping?: Yes    Previous Hospitalizations:   Any hospitalizations or ED visits within the last 12 months?: Yes    How many hospitalizations have you had in the last year?: 1-2    Advance Care Planning:    Living will: No    ACP document given: Yes      PREVENTIVE SCREENINGS      Cardiovascular Screening:    General: Screening Not Indicated and History Lipid Disorder      Diabetes Screening:     General: Screening Current      Colorectal Cancer Screening:     General: Screening Current      Breast Cancer Screening:     General: History Breast Cancer      Cervical Cancer Screening:    General: Screening Not Indicated      Osteoporosis Screening:    General: Screening Not Indicated and History Osteoporosis      Lung Cancer Screening:     General: Screening Not Indicated      Hepatitis C Screening:    General: Screening Current    Screening, Brief Intervention, and Referral to Treatment (SBIRT)    Screening  Typical number of drinks in a day: 0  Typical number of drinks in a week: 0  Interpretation: Low risk drinking behavior.    Single Item Drug Screening:  How often have you used an illegal drug (including marijuana) or a prescription medication for non-medical reasons in the past year? never    Single Item Drug Screen Score: 0  Interpretation: Negative screen for possible drug use disorder    No results found.     Physical Exam:     /72   Pulse 84   Temp 97.9 °F (36.6 °C)   Resp 20   Ht 5' (1.524 m)   Wt 82.1 kg (181 lb)   LMP  (LMP Unknown)   BMI 35.35 kg/m²     Physical Exam  Vitals and nursing note reviewed.   Constitutional:       General: She is not in acute distress.     Appearance: She is well-developed.   HENT:      Head: Normocephalic and atraumatic.      Right Ear: Tympanic membrane, ear canal and external ear normal.      Left Ear: Tympanic membrane, ear canal and external ear normal.      Nose: Nose normal.      Mouth/Throat:      Mouth: Mucous membranes are moist.      Pharynx: Oropharynx is clear.   Eyes:      Conjunctiva/sclera: Conjunctivae normal.      Pupils: Pupils are equal, round, and reactive to light.   Cardiovascular:      Rate and Rhythm: Normal rate and regular rhythm.       Heart sounds: No murmur heard.  Pulmonary:      Effort: Pulmonary effort is normal. No respiratory distress.      Breath sounds: Normal breath sounds.   Abdominal:      General: Abdomen is flat. Bowel sounds are normal.      Palpations: Abdomen is soft.      Tenderness: There is no abdominal tenderness.   Musculoskeletal:         General: No swelling.      Cervical back: Normal range of motion and neck supple.   Skin:     General: Skin is warm and dry.      Capillary Refill: Capillary refill takes less than 2 seconds.   Neurological:      Mental Status: She is alert.   Psychiatric:         Mood and Affect: Mood normal.          Domingo Rosen, DO

## 2024-01-17 ENCOUNTER — HOSPITAL ENCOUNTER (EMERGENCY)
Facility: HOSPITAL | Age: 69
Discharge: HOME | End: 2024-01-17
Attending: EMERGENCY MEDICINE
Payer: MEDICARE

## 2024-01-17 ENCOUNTER — APPOINTMENT (EMERGENCY)
Dept: RADIOLOGY | Facility: HOSPITAL | Age: 69
End: 2024-01-17
Payer: MEDICARE

## 2024-01-17 ENCOUNTER — APPOINTMENT (EMERGENCY)
Dept: CT IMAGING | Facility: HOSPITAL | Age: 69
End: 2024-01-17
Payer: MEDICARE

## 2024-01-17 VITALS
RESPIRATION RATE: 18 BRPM | DIASTOLIC BLOOD PRESSURE: 90 MMHG | TEMPERATURE: 97 F | OXYGEN SATURATION: 96 % | SYSTOLIC BLOOD PRESSURE: 180 MMHG | HEART RATE: 87 BPM

## 2024-01-17 DIAGNOSIS — S62.397A OTHER FRACTURE OF FIFTH METACARPAL BONE, LEFT HAND, INITIAL ENCOUNTER FOR CLOSED FRACTURE: ICD-10-CM

## 2024-01-17 DIAGNOSIS — S42.462A: ICD-10-CM

## 2024-01-17 DIAGNOSIS — S52.202A LEFT ULNAR FRACTURE: Primary | ICD-10-CM

## 2024-01-17 PROCEDURE — 96374 THER/PROPH/DIAG INJ IV PUSH: CPT

## 2024-01-17 PROCEDURE — 72125 CT NECK SPINE W/O DYE: CPT

## 2024-01-17 PROCEDURE — 71260 CT THORAX DX C+: CPT

## 2024-01-17 PROCEDURE — 70450 CT HEAD/BRAIN W/O DYE: CPT

## 2024-01-17 PROCEDURE — 99284 EMERGENCY DEPT VISIT MOD MDM: CPT

## 2024-01-17 PROCEDURE — 73110 X-RAY EXAM OF WRIST: CPT

## 2024-01-17 PROCEDURE — 73030 X-RAY EXAM OF SHOULDER: CPT

## 2024-01-17 PROCEDURE — 71045 X-RAY EXAM CHEST 1 VIEW: CPT

## 2024-01-17 PROCEDURE — 73060 X-RAY EXAM OF HUMERUS: CPT

## 2024-01-17 PROCEDURE — 74177 CT ABD & PELVIS W/CONTRAST: CPT

## 2024-01-17 PROCEDURE — 96376 TX/PRO/DX INJ SAME DRUG ADON: CPT

## 2024-01-17 PROCEDURE — 73080 X-RAY EXAM OF ELBOW: CPT

## 2024-01-17 PROCEDURE — 73090 X-RAY EXAM OF FOREARM: CPT

## 2024-01-17 RX ORDER — OXYCODONE HYDROCHLORIDE 5 MG/1
5 TABLET ORAL EVERY 6 HOURS PRN
Qty: 10 TABLET | Refills: 0 | Status: SHIPPED | OUTPATIENT
Start: 2024-01-17

## 2024-01-17 RX ORDER — OXYCODONE HYDROCHLORIDE 5 MG/1
5 TABLET ORAL ONCE
Status: COMPLETED | OUTPATIENT
Start: 2024-01-17 | End: 2024-01-17

## 2024-01-17 RX ORDER — ACETAMINOPHEN 325 MG/1
975 TABLET ORAL ONCE
Status: COMPLETED | OUTPATIENT
Start: 2024-01-17 | End: 2024-01-17

## 2024-01-17 RX ORDER — HYDROMORPHONE HCL IN WATER/PF 6 MG/30 ML
0.2 PATIENT CONTROLLED ANALGESIA SYRINGE INTRAVENOUS ONCE
Status: COMPLETED | OUTPATIENT
Start: 2024-01-17 | End: 2024-01-17

## 2024-01-17 RX ORDER — METHOCARBAMOL 500 MG/1
500 TABLET, FILM COATED ORAL EVERY 8 HOURS PRN
Qty: 20 TABLET | Refills: 0 | Status: SHIPPED | OUTPATIENT
Start: 2024-01-17

## 2024-01-17 RX ADMIN — ACETAMINOPHEN 975 MG: 325 TABLET, FILM COATED ORAL at 19:38

## 2024-01-17 RX ADMIN — IOHEXOL 100 ML: 350 INJECTION, SOLUTION INTRAVENOUS at 15:50

## 2024-01-17 RX ADMIN — HYDROMORPHONE HYDROCHLORIDE 0.2 MG: 0.2 INJECTION, SOLUTION INTRAMUSCULAR; INTRAVENOUS; SUBCUTANEOUS at 15:38

## 2024-01-17 RX ADMIN — HYDROMORPHONE HYDROCHLORIDE 0.2 MG: 0.2 INJECTION, SOLUTION INTRAMUSCULAR; INTRAVENOUS; SUBCUTANEOUS at 17:20

## 2024-01-17 RX ADMIN — OXYCODONE HYDROCHLORIDE 5 MG: 5 TABLET ORAL at 19:38

## 2024-01-17 NOTE — ED PROVIDER NOTES
Emergency Department Trauma Note  Radha Covington 68 y.o. female MRN: [id number]  Unit/Bed#: RM22/RM22 Encounter: [id number]      Trauma Alert: Trauma Acuity: Trauma Evaluation  Model of Arrival: Mode of Arrival: Other (Comment) (Private vehicle) via    Trauma Team: Current Providers  Attending Provider: Ting Mejia DO  Attending Provider: Tramaine Taveras MD  Attending Provider: Tramaine Taveras MD  Attending Provider: Ting Mejia DO  Registered Nurse: Rosa Steve RN  Physician Assistant: Tato Rosario PA-C  Consultants:     None      History of Present Illness     Chief Complaint:   Chief Complaint   Patient presents with    Fall     States she lost her balance and fell backwards down approx 10 steps. Hit head. Denies LOC, denies thinners. Complains or left shoulder and arm pain      HPI:  Radha Covington is a 68 y.o. female who presents after fall.  Mechanism:Details of Incident: Patient states when she got to the top of her steps when she lost her balance and fell down about 10 steps. Denies any LOC or thinners Injury Date: 01/17/24 Injury Time: 1300 Injury Occurence [facility] - Spec[facility]    68-year-old female presents for evaluation after fall.  Fall occurred about 2 hours prior to arrival, she needed assistance off the ground however this is not unheard of for her.  Patient states she was at the top of the flight of steps when she went to switch her cane into her other hand and lost balance.  Fell backwards and slid down the steps.  Patient does complain of a generalized headache, generalized low back pain, left upper extremity pain.  Denies loss of consciousness, chest pain or dyspnea, abdominal pain, nausea or vomiting.  Patient is not maintained on blood thinning medications.  Patient refuses to move left upper extremity due to pain however denies paresthesias.      Review of Systems   Respiratory:  Negative for shortness of breath.    Cardiovascular:  Negative for  chest pain.   Gastrointestinal:  Negative for abdominal pain, nausea and vomiting.   Musculoskeletal:  Positive for arthralgias and myalgias.   Neurological:  Negative for syncope and numbness.   All other systems reviewed and are negative.      Historical Information     Immunizations:   Immunization History   Administered Date(s) Administered    COVID-19 MODERNA VACC 0.5 ML IM 03/24/2021, 04/21/2021, 11/09/2021    INFLUENZA 09/28/2020, 10/31/2022, 10/31/2023    Influenza, high dose seasonal 0.7 mL 12/21/2021    Respiratory Syncytial Virus Vaccine (Recombinant, Adjuvanted) 01/05/2024       Past Medical History:   Diagnosis Date    Breast cancer (HCC) 03/01/2017    right breast intraductal carcinoma performed at [facility]    History of radiation therapy 08/01/2017    right breast       Family History   Problem Relation Age of Onset    Breast cancer Sister 56    Uterine cancer Sister     BRCA1 Negative Sister     BRCA2 Negative Sister     Breast cancer Maternal Grandmother 75    Breast cancer Cousin 50     Past Surgical History:   Procedure Laterality Date    BREAST BIOPSY Left 1979    benign    BREAST EXCISIONAL BIOPSY Right 04/2017    intraductal carcinoma needle loc performed at [facility]    BREAST LUMPECTOMY Right 03/01/2017    BREAST SURGERY      CHOLECYSTECTOMY      COLONOSCOPY      ESOPHAGOGASTRODUODENOSCOPY      MS COLONOSCOPY FLX DX W/COLLJ SPEC WHEN PFRMD N/A 10/23/2017    Procedure: COLONOSCOPY;  Surgeon: Riky Fortune MD;  Location: [facility];  Service: Colorectal     Social History     Tobacco Use    Smoking status: Never    Smokeless tobacco: Never   Vaping Use    Vaping status: Never Used   Substance Use Topics    Alcohol use: Not Currently     Comment: rarely    Drug use: No     E-Cigarette/Vaping    E-Cigarette Use Never User      E-Cigarette/Vaping Substances       Family History: non-contributory    Meds/Allergies   Prior to Admission Medications   Prescriptions Last Dose Informant Patient Reported?  Taking?   Calcium Ascorbate 500 MG TABS  Self Yes No   Sig: Take 500 mg by mouth 2 (two) times a day    Cholecalciferol 125 MCG (5000 UT) capsule   Yes No   Sig: Take 5,000 Units by mouth daily   DULoxetine (CYMBALTA) 30 mg delayed release capsule   No No   Sig: Take 1 capsule (30 mg total) by mouth daily   Multiple Vitamin (MULTI-VITAMIN DAILY PO)  Self Yes No   Sig: Take 500 mg by mouth daily    azelastine (ASTELIN) 0.1 % nasal spray  Self Yes No   Si spray into each nostril daily Use in each nostril as directed   co-enzyme Q-10 30 MG capsule  Self Yes No   Sig: Take 30 mg by mouth daily   ezetimibe (ZETIA) 10 mg tablet   No No   Sig: Take 1 tablet (10 mg total) by mouth daily   fluticasone (FLONASE) 50 mcg/act nasal spray  Self Yes No   Si sprays into each nostril daily   hydroxychloroquine (PLAQUENIL) 200 mg tablet  Self Yes No   Sig: Take 200 mg by mouth daily   loperamide (IMODIUM) 2 mg/15 mL oral liquid  Self Yes No   naproxen (Naprosyn) 500 mg tablet   No No   Sig: Take 1 tablet (500 mg total) by mouth 2 (two) times a day with meals   timolol (TIMOPTIC) 0.5 % ophthalmic solution  Self Yes No   Sig: Administer 1 drop to both eyes every 12 (twelve) hours      Facility-Administered Medications: None       Allergies   Allergen Reactions    Bactrim [Sulfamethoxazole-Trimethoprim] Anaphylaxis and Swelling     Swelling of throat and nose; itching    Other      clear tape _blisters    Peaches, potatoes, apples- per allergy testing per pt    Molds & Smuts     Pollen Extract Itching    Wound Dressings Rash       PHYSICAL EXAM    PE limited by: none    Objective   Vitals:   First set: Temperature: (!) 96.5 °F (35.8 °C) (24 152)  Pulse: 83 (24 1526)  Respirations: 18 (24 152)  Blood Pressure: (!) 189/81 (24 1526)  SpO2: 96 % (24 152)    Primary Survey:   (A) Airway: intact  (B) Breathing: b/l breath sounds, no respiratory distress  (C) Circulation: Pulses:   normal  (D)  Disabliity:  GCS Total:  15  (E) Expose:  Completed    Secondary Survey: (Click on Physical Exam tab above)  Physical Exam  Vitals reviewed.   Constitutional:       Appearance: Normal appearance. She is not ill-appearing or toxic-appearing.   HENT:      Head: Normocephalic and atraumatic.      Right Ear: External ear normal.      Left Ear: External ear normal.      Nose: Nose normal.   Eyes:      General:         Right eye: No discharge.         Left eye: No discharge.      Extraocular Movements: Extraocular movements intact.   Cardiovascular:      Rate and Rhythm: Normal rate and regular rhythm.   Pulmonary:      Effort: Pulmonary effort is normal. No respiratory distress.      Breath sounds: Normal breath sounds.   Abdominal:      General: There is no distension.      Palpations: Abdomen is soft.      Tenderness: There is no abdominal tenderness. There is no right CVA tenderness, left CVA tenderness, guarding or rebound.   Musculoskeletal:         General: Tenderness present.      Comments: Holding LUE in adduction, flexion, able to move fingers on L hand, complains of diffuse tenderness to palpation along UE, bruising to L forearm, radial pulse intact; RUE using freely, small superficial abrasions to fingers; able to flex b/l hips and knees; no midline c/t pain, broadly low lumber back   Skin:     General: Skin is warm.      Coloration: Skin is not jaundiced or pale.      Findings: Bruising (L forearm) present.   Neurological:      General: No focal deficit present.      Mental Status: She is alert.         Cervical spine cleared by clinical criteria? Yes     Invasive Devices       Peripheral Intravenous Line  Duration             Peripheral IV 11/19/17 Left Antecubital 2249 days    Peripheral IV 01/17/24 Right Antecubital <1 day                    Lab Results:   Results Reviewed       None                   Imaging Studies:   Direct to CT: No  TRAUMA - CT head wo contrast   Final Result by Freddy Mayes MD  (01/17 1630)      No acute intracranial abnormality.                  Workstation performed: [id number]         TRAUMA - CT spine cervical wo contrast   Final Result by Freddy Mayes MD (01/17 1633)      No cervical spine fracture or traumatic malalignment.                  Workstation performed: [id number]         TRAUMA - CT chest abdomen pelvis w contrast   Final Result by Freddy Mayes MD (01/17 1641)      Minimal soft tissue contusion above the left shoulder without underlying fracture.      No other acute posttraumatic abnormality detected in the chest, abdomen or pelvis.      Chronic L2 compression deformity.                  Workstation performed: [id number]         XR Trauma chest portable    (Results Pending)   XR shoulder 2+ views LEFT    (Results Pending)   XR humerus LEFT    (Results Pending)   XR forearm 2 views LEFT    (Results Pending)   XR wrist 3+ views LEFT    (Results Pending)   XR elbow 3+ vw left    (Results Pending)         Procedures  Splint application    Date/Time: 1/17/2024 7:30 PM    Performed by: Ting Mejia DO  Authorized by: Ting Mejia DO  [place] Protocol:  Procedure performed by: (Cristina, Bruce)  Consent: Verbal consent obtained.  Risks and benefits: risks, benefits and alternatives were discussed  Consent given by: patient  Required items: required blood products, implants, devices, and special equipment available  Patient identity confirmed: verbally with patient    Pre-procedure details:     Sensation:  Normal    Skin color:  Pink  Procedure details:     Laterality:  Left    Location:  Arm    Arm:  L lower arm    Strapping: no      Splint type:  Long arm    Supplies:  Cotton padding, elastic bandage, Ortho-Glass and sling  Post-procedure details:     Pain:  Unchanged    Sensation:  Normal    Skin color:  Pink    Patient tolerance of procedure:  Tolerated well, no immediate complications           ED Course  ED Course as of 01/17/24 2030 Wed Jan 17,  2024   1548 XR Trauma chest portable  On my interpretation, no acute cardiopulmonary disease, no ptx; L shoulder not captured   1704 TRAUMA - CT chest abdomen pelvis w contrast  On  film, abnormality to L elbow; although patient was holding arm over body it was poorly captured for imaging purposes, will obtain additional XR imaging.    Discussed other results with patient and , they are aware of the compression fracture. Patient was given ice for her arm, will provide additional dose of pain medication.   1757 XR forearm 2 views LEFT  On my interpretation, ulnar shaft fractures, likely distal humerus fracture   1829 XR shoulder 2+ views LEFT  On my interpretation, no acute osseous abnormality   1830 XR humerus LEFT  On my interpretation, possible medial condyle fracture    1844 XR wrist 3+ views LEFT  Degenerative changes on lateral carpals, no pain on palpation, likely old; pt is complaining of 5th finger pain, on inspection of finger, possible distal metacarpal injury with good alignment           Medical Decision Making  68-year-old female presents for evaluation after fall with head strike.  Patient has is not on blood thinning medications however given mechanism was made a trauma evaluation.  Patient reports low back pain, left upper extremity pain.  Will obtain CT imaging to assess for injury including intracranial injury, spinal injury, or abdominal injury.  Will obtain x-rays of left upper extremity to assess for fractures.    Amount and/or Complexity of Data Reviewed  Radiology: ordered. Decision-making details documented in ED Course.    Risk  OTC drugs.  Prescription drug management.                Disposition  [facility] Transfer: No  Final diagnoses:   Left ulnar fracture   Closed fracture of medial condyle of distal end of left humerus   Other fracture of fifth metacarpal bone, left hand, initial encounter for closed fracture     Time reflects when diagnosis was documented in both MDM as  applicable and the Disposition within this note       Time User Action Codes Description Comment    1/17/2024  6:41 PM Ting Mejia Add [S52.202A] Left ulnar fracture     1/17/2024  7:32 PM Ting Mejia Add [S42.462A] Closed fracture of medial condyle of distal end of left humerus     1/17/2024  7:34 PM Ting Mejia Add [S62.397A] Other fracture of fifth metacarpal bone, left hand, initial encounter for closed fracture           ED Disposition       ED Disposition   Discharge    Condition   Stable    Date/Time   Wed Jan 17, 2024  7:35 PM    Comment   Radha Covington discharge to home/self care.                   Follow-up Information       Follow up With Specialties Details Why Contact Info Additional Information    [facility] [place] [facility] Schedule an appointment as soon as possible for a visit   [address] [place] [id number] [phone] [facility] [place], [address] [place] [address], [address] [phone]    [facility] Emergency Department Emergency Medicine  If symptoms worsen [address] [facility] [address]  [phone] [facility] Emergency Department, [address] [place], [place], [address]          Discharge Medication List as of 1/17/2024  7:35 PM        START taking these medications    Details   methocarbamol (ROBAXIN) 500 mg tablet Take 1 tablet (500 mg total) by mouth every 8 (eight) hours as needed (Pain, muscle spasms), Starting Wed 1/17/2024, Normal      oxyCODONE (ROXICODONE) 5 immediate release tablet Take 1 tablet (5 mg total) by mouth every 6 (six) hours as needed for severe pain for up to 10 doses Max Daily Amount: 20 mg, Starting Wed 1/17/2024, Normal           CONTINUE these medications which have NOT CHANGED    Details   azelastine (ASTELIN) 0.1 % nasal spray 1 spray into each nostril daily Use in each nostril as directed, Historical  Med      Calcium Ascorbate 500 MG TABS Take 500 mg by mouth 2 (two) times a day , Historical Med      Cholecalciferol 125 MCG (5000 UT) capsule Take 5,000 Units by mouth daily, Starting Thu 12/28/2023, Until Fri 12/27/2024, Historical Med      co-enzyme Q-10 30 MG capsule Take 30 mg by mouth daily, Historical Med      DULoxetine (CYMBALTA) 30 mg delayed release capsule Take 1 capsule (30 mg total) by mouth daily, Starting Fri 1/5/2024, Normal      ezetimibe (ZETIA) 10 mg tablet Take 1 tablet (10 mg total) by mouth daily, Starting Mon 7/3/2023, Normal      fluticasone (FLONASE) 50 mcg/act nasal spray 2 sprays into each nostril daily, Historical Med      hydroxychloroquine (PLAQUENIL) 200 mg tablet Take 200 mg by mouth daily, Starting Mon 9/16/2019, Historical Med      loperamide (IMODIUM) 2 mg/15 mL oral liquid Starting Tue 3/30/2021, Historical Med      Multiple Vitamin (MULTI-VITAMIN DAILY PO) Take 500 mg by mouth daily , Historical Med      naproxen (Naprosyn) 500 mg tablet Take 1 tablet (500 mg total) by mouth 2 (two) times a day with meals, Starting Wed 7/5/2023, Normal      timolol (TIMOPTIC) 0.5 % ophthalmic solution Administer 1 drop to both eyes every 12 (twelve) hours, Starting Tue 5/12/2020, Historical Med           No discharge procedures on file.    PDMP Review         Value Time User    PDMP Reviewed  Yes 1/17/2024  7:34 PM Ting Mejia DO            ED Provider  Electronically Signed by           Ting Mejia DO  01/17/24 2030

## 2024-01-25 ENCOUNTER — OFFICE VISIT (OUTPATIENT)
Dept: OBGYN CLINIC | Facility: CLINIC | Age: 69
End: 2024-01-25
Payer: MEDICARE

## 2024-01-25 VITALS — HEIGHT: 60 IN | WEIGHT: 181 LBS | BODY MASS INDEX: 35.53 KG/M2

## 2024-01-25 DIAGNOSIS — S52.202A CLOSED FRACTURE OF SHAFT OF LEFT ULNA, UNSPECIFIED FRACTURE MORPHOLOGY, INITIAL ENCOUNTER: Primary | ICD-10-CM

## 2024-01-25 PROCEDURE — 99213 OFFICE O/P EST LOW 20 MIN: CPT | Performed by: ORTHOPAEDIC SURGERY

## 2024-01-25 PROCEDURE — 25530 CLTX ULNAR SHFT FX W/O MNPJ: CPT | Performed by: ORTHOPAEDIC SURGERY

## 2024-01-25 NOTE — PROGRESS NOTES
Assessment/Plan:   Diagnoses and all orders for this visit:    Closed fracture of shaft of left ulna, unspecified fracture morphology, initial encounter  -     Brace  -     Fracture / Dislocation Treatment         Reviewed physical exam and imaging with patient at time of visit. Her imaging demonstrates a nondisplaced ulnar shaft fracture of her left forearm. An order was placed for an ulnar fracture brace, she will be contacted by our brace specialist. In the meantime, she was instructed to remain in the splint and sling for immobilization. She may use OTC pain medications as needed. She will return in 4 weeks for repeat X-rays of her left forearm. The patient expresses understanding and is in agreement with today's treatment plan.     The patient has isolated ulnar shaft fracture of her left forearm.  This does not require surgical invention.  Would recommend fracture bracing.  The order was placed.  Arrangements will be made with the orthotist.  Follow-up 1 month evaluation with new x-rays of left forearm-2 views    Subjective:   Patient ID: Radha Covington  1955     HPI  Patient is a 68 y.o. female who presents for initial evaluation of her left forearm. The patient states that on 1/17/24 she fell down her porch stairs, backwards and used her left arm to brace her fall. She experienced pain immediately and therefore reported to her local ED for imaging. She was placed in a sugar tong splint and sling for immobilization. She reports continued pain in her forearm. She denies numbness or tingling.     The following portions of the patient's history were reviewed and updated as appropriate:  Past medical history, past surgical history, Family history, social history, current medications and allergies    Past Medical History:   Diagnosis Date    Breast cancer (HCC) 03/01/2017    right breast intraductal carcinoma performed at [facility]    History of radiation therapy 08/01/2017    right breast       Past Surgical  History:   Procedure Laterality Date    BREAST BIOPSY Left 1979    benign    BREAST EXCISIONAL BIOPSY Right 04/2017    intraductal carcinoma needle loc performed at [facility]    BREAST LUMPECTOMY Right 03/01/2017    BREAST SURGERY      CHOLECYSTECTOMY      COLONOSCOPY      ESOPHAGOGASTRODUODENOSCOPY      OK COLONOSCOPY FLX DX W/COLLJ SPEC WHEN PFRMD N/A 10/23/2017    Procedure: COLONOSCOPY;  Surgeon: Riky Fortune MD;  Location: [facility];  Service: Colorectal       Family History   Problem Relation Age of Onset    Breast cancer Sister 56    Uterine cancer Sister     BRCA1 Negative Sister     BRCA2 Negative Sister     Breast cancer Maternal Grandmother 75    Breast cancer Cousin 50       Social History     Socioeconomic History    Marital status: /Civil Union     Spouse name: None    Number of children: None    Years of education: None    Highest education level: None   Occupational History    None   Tobacco Use    Smoking status: Never    Smokeless tobacco: Never   Vaping Use    Vaping status: Never Used   Substance and Sexual Activity    Alcohol use: Not Currently     Comment: rarely    Drug use: No    Sexual activity: None   Other Topics Concern    None   Social History Narrative    None     Social Determinants of Health     Financial Resource Strain: Low Risk  (1/5/2024)    Overall Financial Resource Strain (CARDIA)     Difficulty of Paying Living Expenses: Not hard at all   Food Insecurity: Not on file   Transportation Needs: No Transportation Needs (1/5/2024)    PRAPARE - Transportation     Lack of Transportation (Medical): No     Lack of Transportation (Non-Medical): No   Physical Activity: Not on file   Stress: Not on file   Social Connections: Not on file   Intimate Partner Violence: Not on file   Housing Stability: Not on file         Current Outpatient Medications:     azelastine (ASTELIN) 0.1 % nasal spray, 1 spray into each nostril daily Use in each nostril as directed, Disp: , Rfl:     Calcium  Ascorbate 500 MG TABS, Take 500 mg by mouth 2 (two) times a day , Disp: , Rfl:     Cholecalciferol 125 MCG (5000 UT) capsule, Take 5,000 Units by mouth daily, Disp: , Rfl:     co-enzyme Q-10 30 MG capsule, Take 30 mg by mouth daily, Disp: , Rfl:     DULoxetine (CYMBALTA) 30 mg delayed release capsule, Take 1 capsule (30 mg total) by mouth daily, Disp: 90 capsule, Rfl: 3    ezetimibe (ZETIA) 10 mg tablet, Take 1 tablet (10 mg total) by mouth daily, Disp: 90 tablet, Rfl: 3    fluticasone (FLONASE) 50 mcg/act nasal spray, 2 sprays into each nostril daily, Disp: , Rfl:     hydroxychloroquine (PLAQUENIL) 200 mg tablet, Take 200 mg by mouth daily, Disp: , Rfl: 0    loperamide (IMODIUM) 2 mg/15 mL oral liquid, , Disp: , Rfl:     methocarbamol (ROBAXIN) 500 mg tablet, Take 1 tablet (500 mg total) by mouth every 8 (eight) hours as needed (Pain, muscle spasms), Disp: 20 tablet, Rfl: 0    Multiple Vitamin (MULTI-VITAMIN DAILY PO), Take 500 mg by mouth daily , Disp: , Rfl:     naproxen (Naprosyn) 500 mg tablet, Take 1 tablet (500 mg total) by mouth 2 (two) times a day with meals, Disp: 180 tablet, Rfl: 1    oxyCODONE (ROXICODONE) 5 immediate release tablet, Take 1 tablet (5 mg total) by mouth every 6 (six) hours as needed for severe pain for up to 10 doses Max Daily Amount: 20 mg, Disp: 10 tablet, Rfl: 0    timolol (TIMOPTIC) 0.5 % ophthalmic solution, Administer 1 drop to both eyes every 12 (twelve) hours, Disp: , Rfl:     Allergies   Allergen Reactions    Bactrim [Sulfamethoxazole-Trimethoprim] Anaphylaxis and Swelling     Swelling of throat and nose; itching    Other      clear tape _blisters    Peaches, potatoes, apples- per allergy testing per pt    Molds & Smuts     Pollen Extract Itching    Wound Dressings Rash       Review of Systems   Constitutional:  Negative for chills, fever and unexpected weight change.   HENT:  Negative for hearing loss, nosebleeds and sore throat.    Eyes:  Negative for pain, redness and visual  disturbance.   Respiratory:  Negative for cough, shortness of breath and wheezing.    Cardiovascular:  Negative for chest pain, palpitations and leg swelling.   Gastrointestinal:  Negative for abdominal pain, nausea and vomiting.   Endocrine: Negative for polydipsia and polyuria.   Genitourinary:  Negative for dysuria and hematuria.   Skin:  Negative for rash and wound.   Neurological:  Negative for dizziness, numbness and headaches.   Psychiatric/Behavioral:  Negative for decreased concentration and suicidal ideas. The patient is not nervous/anxious.    All other systems reviewed and are negative.       Objective:  Ht 5' (1.524 m)   Wt 82.1 kg (181 lb) Comment: reported  LMP  (LMP Unknown)   BMI 35.35 kg/m²     Ortho Exam  left Forearm -   Patient presents in sugar tong splint and sling in place  TTP ulnar shaft, palpated through splint.  Demonstrates normal wrist and finger motion  2+ distal radial pulse with brisk capillary refill to the fingers  Radial, median, and ulnar motor and sensory distrubution intact  Sensation to light touch intact distally       Physical Exam  HENT:      Head: Normocephalic and atraumatic.      Nose: Nose normal.   Eyes:      Conjunctiva/sclera: Conjunctivae normal.   Cardiovascular:      Rate and Rhythm: Normal rate.   Pulmonary:      Effort: Pulmonary effort is normal.   Musculoskeletal:      Cervical back: Neck supple.   Skin:     General: Skin is warm and dry.      Capillary Refill: Capillary refill takes less than 2 seconds.   Neurological:      Mental Status: She is alert and oriented to person, place, and time.   Psychiatric:         Mood and Affect: Mood normal.         Behavior: Behavior normal.          Diagnostic Test Review:  The attending physician has personally reviewed the pertinent films in PACS and the interpretation is as follows:    X-Ray of left forearm, elbow, and wrist taken on 1/17/24 were reviewed and showed an acute nondisplaced fracture in the proximal to  "mid diaphysis of the ulna. No other acute fracture or dislocation. Degenerative changes in the wrist most pronounced at the first CMC joint.      Fracture / Dislocation Treatment    Date/Time: 1/25/2024 8:45 AM    Performed by: Junito Guillaume DO  Authorized by: Junito Guillaume DO    Patient Location:  [facility]  [place] Protocol:  Consent: Verbal consent obtained.  Risks and benefits: risks, benefits and alternatives were discussed  Consent given by: patient  Time out: Immediately prior to procedure a \"time out\" was called to verify the correct patient, procedure, equipment, support staff and site/side marked as required.  Timeout called at: 1/25/2024 9:00 AM.  Patient understanding: patient states understanding of the procedure being performed  Site marked: the operative site was marked  Patient identity confirmed: verbally with patient    Injury location:  Forearm  Location details:  Left forearm  Injury type:  Fracture  Fracture type: ulnar shaft    Neurovascular status: Neurovascularly intact    Distal perfusion: normal    Neurological function: normal    Range of motion: reduced    Local anesthesia used?: No    Manipulation performed?: No    Immobilization:  Brace  Neurovascular status: Neurovascularly intact    Distal perfusion: normal    Neurological function: normal    Range of motion: unchanged    Patient tolerance:  Patient tolerated the procedure well with no immediate complications           Scribe Attestation      I,:  Maria T Adame am acting as a scribe while in the presence of the attending physician.:       I,:  Junito Guillaume DO personally performed the services described in this documentation    as scribed in my presence.:              "

## 2024-02-26 ENCOUNTER — OFFICE VISIT (OUTPATIENT)
Dept: OBGYN CLINIC | Facility: CLINIC | Age: 69
End: 2024-02-26

## 2024-02-26 VITALS
HEIGHT: 60 IN | BODY MASS INDEX: 35.35 KG/M2 | SYSTOLIC BLOOD PRESSURE: 132 MMHG | HEART RATE: 91 BPM | DIASTOLIC BLOOD PRESSURE: 78 MMHG

## 2024-02-26 DIAGNOSIS — S52.202D CLOSED FRACTURE OF SHAFT OF LEFT ULNA WITH ROUTINE HEALING, UNSPECIFIED FRACTURE MORPHOLOGY, SUBSEQUENT ENCOUNTER: Primary | ICD-10-CM

## 2024-02-26 PROCEDURE — 99024 POSTOP FOLLOW-UP VISIT: CPT | Performed by: ORTHOPAEDIC SURGERY

## 2024-02-26 NOTE — PROGRESS NOTES
ASSESSMENT/PLAN:    Diagnoses and all orders for this visit:    Closed fracture of shaft of left ulna with routine healing, unspecified fracture morphology, subsequent encounter  -     Cancel: XR shoulder 2+ vw left; Future  -     XR forearm 2 vw left; Future        X-rays of the patient's left forearm are consistent with early healing of the fracture.  There is good callus ration.  The patient should continue to use the sling and Velcro brace.  She will follow-up with our office in 1 month with new x-rays of her left forearm.  The patient is acceptable to this plan.    Return in about 1 month (around 3/26/2024).      The patient is doing well in regards to her isolated left ulnar shaft fracture.  She still has some pain.  There is callus formation.  Continue home exercise program.  Follow-up 1 month evaluation with new x-rays of left forearm-2 views    _____________________________________________________  CHIEF COMPLAINT:  Chief Complaint   Patient presents with    Left Arm - Follow-up         SUBJECTIVE:  Radha Covington is a 68 y.o. female who presents to our office for a follow-up visit.  The patient is post left ulnar shaft fracture from 1/17/2024.  She has been using the sling.  She does complain of pain, worse along her elbow.  She denies any numbness or tingling.  She denies any fever or chills.    The following portions of the patient's history were reviewed and updated as appropriate: allergies, current medications, past family history, past medical history, past social history, past surgical history and problem list.    PAST MEDICAL HISTORY:  Past Medical History:   Diagnosis Date    Breast cancer (HCC) 03/01/2017    right breast intraductal carcinoma performed at [facility]    History of radiation therapy 08/01/2017    right breast       PAST SURGICAL HISTORY:  Past Surgical History:   Procedure Laterality Date    BREAST BIOPSY Left 1979    benign    BREAST EXCISIONAL BIOPSY Right 04/2017    intraductal  carcinoma needle loc performed at [facility]    BREAST LUMPECTOMY Right 03/01/2017    BREAST SURGERY      CHOLECYSTECTOMY      COLONOSCOPY      ESOPHAGOGASTRODUODENOSCOPY      AK COLONOSCOPY FLX DX W/COLLJ SPEC WHEN PFRMD N/A 10/23/2017    Procedure: COLONOSCOPY;  Surgeon: Riky Fortune MD;  Location: [facility];  Service: Colorectal       FAMILY HISTORY:  Family History   Problem Relation Age of Onset    Breast cancer Sister 56    Uterine cancer Sister     BRCA1 Negative Sister     BRCA2 Negative Sister     Breast cancer Maternal Grandmother 75    Breast cancer Cousin 50       SOCIAL HISTORY:  Social History     Tobacco Use    Smoking status: Never    Smokeless tobacco: Never   Vaping Use    Vaping status: Never Used   Substance Use Topics    Alcohol use: Not Currently     Comment: rarely    Drug use: No       MEDICATIONS:    Current Outpatient Medications:     azelastine (ASTELIN) 0.1 % nasal spray, 1 spray into each nostril daily Use in each nostril as directed, Disp: , Rfl:     Calcium Ascorbate 500 MG TABS, Take 500 mg by mouth 2 (two) times a day , Disp: , Rfl:     Cholecalciferol 125 MCG (5000 UT) capsule, Take 5,000 Units by mouth daily, Disp: , Rfl:     co-enzyme Q-10 30 MG capsule, Take 30 mg by mouth daily, Disp: , Rfl:     DULoxetine (CYMBALTA) 30 mg delayed release capsule, Take 1 capsule (30 mg total) by mouth daily, Disp: 90 capsule, Rfl: 3    ezetimibe (ZETIA) 10 mg tablet, Take 1 tablet (10 mg total) by mouth daily, Disp: 90 tablet, Rfl: 3    fluticasone (FLONASE) 50 mcg/act nasal spray, 2 sprays into each nostril daily, Disp: , Rfl:     hydroxychloroquine (PLAQUENIL) 200 mg tablet, Take 200 mg by mouth daily, Disp: , Rfl: 0    loperamide (IMODIUM) 2 mg/15 mL oral liquid, , Disp: , Rfl:     methocarbamol (ROBAXIN) 500 mg tablet, Take 1 tablet (500 mg total) by mouth every 8 (eight) hours as needed (Pain, muscle spasms), Disp: 20 tablet, Rfl: 0    Multiple Vitamin (MULTI-VITAMIN DAILY PO), Take 500 mg  by mouth daily , Disp: , Rfl:     naproxen (Naprosyn) 500 mg tablet, Take 1 tablet (500 mg total) by mouth 2 (two) times a day with meals, Disp: 180 tablet, Rfl: 1    oxyCODONE (ROXICODONE) 5 immediate release tablet, Take 1 tablet (5 mg total) by mouth every 6 (six) hours as needed for severe pain for up to 10 doses Max Daily Amount: 20 mg, Disp: 10 tablet, Rfl: 0    timolol (TIMOPTIC) 0.5 % ophthalmic solution, Administer 1 drop to both eyes every 12 (twelve) hours, Disp: , Rfl:     ALLERGIES:  Allergies   Allergen Reactions    Bactrim [Sulfamethoxazole-Trimethoprim] Anaphylaxis and Swelling     Swelling of throat and nose; itching    Other      clear tape _blisters    Peaches, potatoes, apples- per allergy testing per pt    Molds & Smuts     Pollen Extract Itching    Wound Dressings Rash       ROS:  Review of Systems     Constitutional: Negative for fatigue, fever or loss of appetite.   HENT: Negative.    Respiratory: Negative for shortness of breath, dyspnea.    Cardiovascular: Negative for chest pain/tightness.   Gastrointestinal: Negative for abdominal pain, N/V.   Endocrine: Negative for cold/heat intolerance, unexplained weight loss/gain.   Genitourinary: Negative for flank pain, dysuria, hematuria.   Musculoskeletal: Positive for arthralgia   Skin: Negative for rash.    Neurological: Negative for numbness or tingling  Psychiatric/Behavioral: Negative for agitation.  _____________________________________________________  PHYSICAL EXAMINATION:    Blood pressure 132/78, pulse 91, height 5' (1.524 m).    Constitutional: Oriented to person, place, and time. Appears well-developed and well-nourished. No distress.   HENT:   Head: Normocephalic.   Eyes: Conjunctivae are normal. Right eye exhibits no discharge. Left eye exhibits no discharge. No scleral icterus.   Cardiovascular: Normal rate.    Pulmonary/Chest: Effort normal.   Neurological: Alert and oriented to person, place, and time.   Skin: Skin is warm and  dry. No rash noted. Not diaphoretic. No erythema. No pallor.   Psychiatric: Normal mood and affect. Behavior is normal. Judgment and thought content normal.      MUSCULOSKELETAL EXAMINATION:   Physical Exam  Ortho Exam    Left upper extremity is neurovascularly intact  Fingers are pink and mobile  Compartments are soft  Slight tenderness to palpation along healing fracture site  Range of motion of the elbow limited to pain  Brisk cap refill  Sensation intact  Objective:  BP Readings from Last 1 Encounters:   02/26/24 132/78      Wt Readings from Last 1 Encounters:   01/25/24 82.1 kg (181 lb)        BMI:   Estimated body mass index is 35.35 kg/m² as calculated from the following:    Height as of this encounter: 5' (1.524 m).    Weight as of 1/25/24: 82.1 kg (181 lb).          Scribe Attestation      I,:  Milton Mcnamara PA-C am acting as a scribe while in the presence of the attending physician.:       I,:  Junito Guillaume, DO personally performed the services described in this documentation    as scribed in my presence.:

## 2024-03-05 ENCOUNTER — TELEPHONE (OUTPATIENT)
Dept: FAMILY MEDICINE CLINIC | Facility: CLINIC | Age: 69
End: 2024-03-05

## 2024-03-05 DIAGNOSIS — R29.6 MULTIPLE FALLS: ICD-10-CM

## 2024-03-05 DIAGNOSIS — M35.1 MIXED CONNECTIVE TISSUE DISEASE (HCC): ICD-10-CM

## 2024-03-05 DIAGNOSIS — R26.89 BALANCE PROBLEM: Primary | ICD-10-CM

## 2024-03-05 NOTE — TELEPHONE ENCOUNTER
Patient would like to pick PT Rx to have in home - states they will  and take care of setting up - did mention to check with he insurance - did give [facility], [facility], and [facility] names.

## 2024-03-12 DIAGNOSIS — N18.31 STAGE 3A CHRONIC KIDNEY DISEASE (HCC): Primary | ICD-10-CM

## 2024-03-15 ENCOUNTER — TELEPHONE (OUTPATIENT)
Dept: NEPHROLOGY | Facility: CLINIC | Age: 69
End: 2024-03-15

## 2024-03-15 ENCOUNTER — TELEPHONE (OUTPATIENT)
Age: 69
End: 2024-03-15

## 2024-03-15 NOTE — TELEPHONE ENCOUNTER
Caller: genevieve from [facility]    Doctor: francisco    Reason for call: db had a fall out of her bed and fell on L forearm and shoulder (same side that's supposed to be healing). Now increase pain and swelling.  Would like to know if weight was allowed on L arm with brace on?  Please advise    Call back#: [phone]

## 2024-03-18 ENCOUNTER — APPOINTMENT (OUTPATIENT)
Dept: LAB | Facility: HOSPITAL | Age: 69
End: 2024-03-18
Payer: MEDICARE

## 2024-03-18 DIAGNOSIS — E55.9 VITAMIN D DEFICIENCY: ICD-10-CM

## 2024-03-18 DIAGNOSIS — E78.5 HYPERLIPIDEMIA, UNSPECIFIED HYPERLIPIDEMIA TYPE: ICD-10-CM

## 2024-03-18 DIAGNOSIS — M32.9 LUPUS (HCC): ICD-10-CM

## 2024-03-18 DIAGNOSIS — N18.31 STAGE 3A CHRONIC KIDNEY DISEASE (HCC): ICD-10-CM

## 2024-03-18 LAB
25(OH)D3 SERPL-MCNC: 41.4 NG/ML (ref 30–100)
ALBUMIN SERPL BCP-MCNC: 4.3 G/DL (ref 3.5–5)
ALP SERPL-CCNC: 63 U/L (ref 34–104)
ALT SERPL W P-5'-P-CCNC: 15 U/L (ref 7–52)
ANION GAP SERPL CALCULATED.3IONS-SCNC: 13 MMOL/L (ref 4–13)
AST SERPL W P-5'-P-CCNC: 16 U/L (ref 13–39)
BASOPHILS # BLD AUTO: 0.04 THOUSANDS/ÂΜL (ref 0–0.1)
BASOPHILS NFR BLD AUTO: 1 % (ref 0–1)
BILIRUB SERPL-MCNC: 0.91 MG/DL (ref 0.2–1)
BUN SERPL-MCNC: 11 MG/DL (ref 5–25)
CALCIUM SERPL-MCNC: 9.3 MG/DL (ref 8.4–10.2)
CHLORIDE SERPL-SCNC: 103 MMOL/L (ref 96–108)
CHOLEST SERPL-MCNC: 224 MG/DL
CO2 SERPL-SCNC: 26 MMOL/L (ref 21–32)
CREAT SERPL-MCNC: 0.93 MG/DL (ref 0.6–1.3)
EOSINOPHIL # BLD AUTO: 0.16 THOUSAND/ÂΜL (ref 0–0.61)
EOSINOPHIL NFR BLD AUTO: 3 % (ref 0–6)
ERYTHROCYTE [DISTWIDTH] IN BLOOD BY AUTOMATED COUNT: 13.4 % (ref 11.6–15.1)
GFR SERPL CREATININE-BSD FRML MDRD: 62 ML/MIN/1.73SQ M
GLUCOSE P FAST SERPL-MCNC: 135 MG/DL (ref 65–99)
HCT VFR BLD AUTO: 45.4 % (ref 34.8–46.1)
HDLC SERPL-MCNC: 83 MG/DL
HGB BLD-MCNC: 14.2 G/DL (ref 11.5–15.4)
IMM GRANULOCYTES # BLD AUTO: 0.01 THOUSAND/UL (ref 0–0.2)
IMM GRANULOCYTES NFR BLD AUTO: 0 % (ref 0–2)
LDLC SERPL CALC-MCNC: 116 MG/DL (ref 0–100)
LYMPHOCYTES # BLD AUTO: 1.39 THOUSANDS/ÂΜL (ref 0.6–4.47)
LYMPHOCYTES NFR BLD AUTO: 24 % (ref 14–44)
MCH RBC QN AUTO: 29 PG (ref 26.8–34.3)
MCHC RBC AUTO-ENTMCNC: 31.3 G/DL (ref 31.4–37.4)
MCV RBC AUTO: 93 FL (ref 82–98)
MONOCYTES # BLD AUTO: 0.46 THOUSAND/ÂΜL (ref 0.17–1.22)
MONOCYTES NFR BLD AUTO: 8 % (ref 4–12)
NEUTROPHILS # BLD AUTO: 3.73 THOUSANDS/ÂΜL (ref 1.85–7.62)
NEUTS SEG NFR BLD AUTO: 64 % (ref 43–75)
NRBC BLD AUTO-RTO: 0 /100 WBCS
PLATELET # BLD AUTO: 319 THOUSANDS/UL (ref 149–390)
PMV BLD AUTO: 9.1 FL (ref 8.9–12.7)
POTASSIUM SERPL-SCNC: 3.9 MMOL/L (ref 3.5–5.3)
PROT SERPL-MCNC: 7.2 G/DL (ref 6.4–8.4)
RBC # BLD AUTO: 4.9 MILLION/UL (ref 3.81–5.12)
SODIUM SERPL-SCNC: 142 MMOL/L (ref 135–147)
TRIGL SERPL-MCNC: 124 MG/DL
WBC # BLD AUTO: 5.79 THOUSAND/UL (ref 4.31–10.16)

## 2024-03-18 PROCEDURE — 82306 VITAMIN D 25 HYDROXY: CPT

## 2024-03-18 PROCEDURE — 85025 COMPLETE CBC W/AUTO DIFF WBC: CPT

## 2024-03-18 PROCEDURE — 80061 LIPID PANEL: CPT

## 2024-03-18 PROCEDURE — 80053 COMPREHEN METABOLIC PANEL: CPT

## 2024-03-18 PROCEDURE — 36415 COLL VENOUS BLD VENIPUNCTURE: CPT

## 2024-03-22 ENCOUNTER — OFFICE VISIT (OUTPATIENT)
Dept: NEPHROLOGY | Facility: CLINIC | Age: 69
End: 2024-03-22
Payer: MEDICARE

## 2024-03-22 VITALS
OXYGEN SATURATION: 96 % | WEIGHT: 186.4 LBS | SYSTOLIC BLOOD PRESSURE: 132 MMHG | HEART RATE: 94 BPM | DIASTOLIC BLOOD PRESSURE: 82 MMHG | BODY MASS INDEX: 35.19 KG/M2 | HEIGHT: 61 IN

## 2024-03-22 DIAGNOSIS — N18.31 STAGE 3A CHRONIC KIDNEY DISEASE (HCC): Primary | ICD-10-CM

## 2024-03-22 DIAGNOSIS — E55.9 VITAMIN D DEFICIENCY: ICD-10-CM

## 2024-03-22 DIAGNOSIS — M32.9 LUPUS (HCC): ICD-10-CM

## 2024-03-22 DIAGNOSIS — S52.202A CLOSED FRACTURE OF SHAFT OF LEFT ULNA, UNSPECIFIED FRACTURE MORPHOLOGY, INITIAL ENCOUNTER: ICD-10-CM

## 2024-03-22 DIAGNOSIS — R19.7 DIARRHEA, UNSPECIFIED TYPE: ICD-10-CM

## 2024-03-22 PROCEDURE — 99214 OFFICE O/P EST MOD 30 MIN: CPT | Performed by: NURSE PRACTITIONER

## 2024-03-22 NOTE — PATIENT INSTRUCTIONS
It was nice to see you today. Please have labs done in 6 months  Talk to Dr. Rosen to see if it is worth it to see a neurologist for your constellation of symptoms

## 2024-03-22 NOTE — PROGRESS NOTES
Nephrology   Office Follow-Up  Radha Covington 68 y.o. female MRN: [id number]    Encounter: [id number]        Assessment & Plan    Radha Covington was seen in the [place] office today. All diagnoses and orders for visit:     1. Stage 3a chronic kidney disease (HCC)  Baseline creatinine 0.9-1.1 mg/dL dating back to 2014. Most recent creatinine 0.93 mg/dL. UA 2023 significant for 1-2 RBC, 1-2 WBC, mod bacteria however asymptomatic. UPCR 0.12 - no repeat. Etiology age-related, potentially obesity related, ? Tubulointerstitial nephritis  No evidence of lupus nephritis. Repeat labs 6 months.   -     CBC and differential; Future; Expected date: 09/16/2024  -     Comprehensive metabolic panel; Future; Expected date: 09/16/2024  -     Magnesium; Future; Expected date: 09/16/2024  -     Urinalysis with microscopic; Future; Expected date: 09/16/2024  -     Albumin / creatinine urine ratio; Future; Expected date: 09/16/2024  2. Lupus (HCC)  -     Anti-DNA antibody, double-stranded; Future; Expected date: 09/16/2024  -     C3 complement; Future; Expected date: 09/16/2024  -     C4 complement; Future; Expected date: 09/16/2024  3. Vitamin D deficiency  Continue vitamin D supplement  4. Diarrhea, unspecified type  Previously felt to be related to microscopic colitis when evaluated by Dr. Fortune. Declines referral to local GI. Remains on imodium   5. Closed fracture of shaft of left ulna, unspecified fracture morphology, initial encounter  Follow-up with Dr. Guillaume scheduled        HPI: Radha Covington is a 68 y.o. female who is here for scheduled follow-up     Pertinent problems include CKD 3a and lupus without history of lupus nephritis followed by rheumatology. More recently patient states she fell and suffered ulnar fracture. She is undergoing PTH and seeing orthopedics next week.    Kidney function is stable. Repeat labs in 6 months and then return with nephrologist. Declined referral to GI specialist for diarrhea.       ROS:    Review of Systems   Constitutional:  Positive for activity change and fatigue. Negative for chills and fever.   HENT:  Negative for ear pain and sore throat.    Eyes:  Negative for pain and visual disturbance.   Respiratory:  Negative for cough and shortness of breath.    Cardiovascular:  Negative for chest pain and palpitations.   Gastrointestinal:  Positive for diarrhea. Negative for abdominal pain and vomiting.   Genitourinary:  Negative for dysuria and hematuria.   Musculoskeletal:  Positive for arthralgias and gait problem. Negative for back pain.   Skin:  Negative for color change and rash.   Neurological:  Positive for dizziness, weakness and light-headedness. Negative for seizures and syncope.   Psychiatric/Behavioral:  Positive for sleep disturbance.    All other systems reviewed and are negative.      Allergies: Bactrim [sulfamethoxazole-trimethoprim], Other, Molds & smuts, Pollen extract, and Wound dressings    Medications:   Current Outpatient Medications:     azelastine (ASTELIN) 0.1 % nasal spray, 1 spray into each nostril daily Use in each nostril as directed, Disp: , Rfl:     Calcium Ascorbate 500 MG TABS, Take 500 mg by mouth 2 (two) times a day , Disp: , Rfl:     Cholecalciferol 125 MCG (5000 UT) capsule, Take 5,000 Units by mouth daily, Disp: , Rfl:     co-enzyme Q-10 30 MG capsule, Take 30 mg by mouth daily, Disp: , Rfl:     DULoxetine (CYMBALTA) 30 mg delayed release capsule, Take 1 capsule (30 mg total) by mouth daily (Patient taking differently: Take 60 mg by mouth daily), Disp: 90 capsule, Rfl: 3    ezetimibe (ZETIA) 10 mg tablet, Take 1 tablet (10 mg total) by mouth daily, Disp: 90 tablet, Rfl: 3    fluticasone (FLONASE) 50 mcg/act nasal spray, 2 sprays into each nostril daily, Disp: , Rfl:     hydroxychloroquine (PLAQUENIL) 200 mg tablet, Take 200 mg by mouth daily, Disp: , Rfl: 0    loperamide (IMODIUM) 2 mg/15 mL oral liquid, , Disp: , Rfl:     methocarbamol (ROBAXIN) 500 mg tablet,  Take 1 tablet (500 mg total) by mouth every 8 (eight) hours as needed (Pain, muscle spasms), Disp: 20 tablet, Rfl: 0    Multiple Vitamin (MULTI-VITAMIN DAILY PO), Take 500 mg by mouth daily , Disp: , Rfl:     timolol (TIMOPTIC) 0.5 % ophthalmic solution, Administer 1 drop to both eyes every 12 (twelve) hours, Disp: , Rfl:     Past Medical History:   Diagnosis Date    Arthritis     Breast cancer (HCC) 03/01/2017    right breast intraductal carcinoma performed at [facility]    Cancer (HCC) 3/2017    rt. breast cancer    Chronic kidney disease     History of radiation therapy 08/01/2017    right breast    Hyperlipidemia     Lupus (HCC) 2019    Diagnosis is lupus.    Urinary tract infection 2006     Past Surgical History:   Procedure Laterality Date    BREAST BIOPSY Left 1979    benign    BREAST EXCISIONAL BIOPSY Right 04/2017    intraductal carcinoma needle loc performed at [facility]    BREAST LUMPECTOMY Right 03/01/2017    BREAST SURGERY      CHOLECYSTECTOMY      COLONOSCOPY      ESOPHAGOGASTRODUODENOSCOPY      CO COLONOSCOPY FLX DX W/COLLJ SPEC WHEN PFRMD N/A 10/23/2017    Procedure: COLONOSCOPY;  Surgeon: Riky Fortune MD;  Location: [facility] OR;  Service: Colorectal     Family History   Problem Relation Age of Onset    Breast cancer Sister 56    Uterine cancer Sister     BRCA1 Negative Sister     BRCA2 Negative Sister     Breast cancer Maternal Grandmother 75    Cancer Maternal Grandmother         Left breast cancer    Breast cancer Cousin 50    Heart disease Mother     Hypertension Mother     Cancer Father         Bladder cancer    Heart disease Father     Hypertension Father     Cancer Sister         Left breast cancer      reports that she has never smoked. She has never used smokeless tobacco. She reports that she does not currently use alcohol. She reports that she does not use drugs.      Physical Exam:   Vitals:    03/22/24 1357   BP: 132/82   BP Location: Right arm   Patient Position: Sitting   Pulse: 94   SpO2:  "96%   Weight: 84.6 kg (186 lb 6.4 oz)   Height: 5' 1\" (1.549 m)     Body mass index is 35.22 kg/m².    General: conscious, cooperative, in no acute distress, appears stated age  Eyes: conjunctivae pale, anicteric sclerae  ENT: lips and mucous membranes moist  Neck: supple, no JVD, no masses  Chest:  essentially clear breath sounds bilaterally, no crackles, ronchus or wheezings  CVS: S1 & S2, normal rate, regular rhythm  Abdomen: soft, non-tender, non-distended, normoactive bowel sounds, rounded obese  Extremities: no edema of both legs left arm in cast, left ecchymotic fingers  Skin: no rash   Neuro: awake, alert, oriented       Diagnostic Data:  Lab: I have personally reviewed pertinent lab results.,   CBC:  Results from last 7 days   Lab Units 03/18/24  0838   WBC Thousand/uL 5.79   HEMOGLOBIN g/dL 14.2   HEMATOCRIT % 45.4   PLATELETS Thousands/uL 319      CMP: No results found for: \"SODIUM\", \"K\", \"CL\", \"CO2\", \"ANIONGAP\", \"BUN\", \"CREATININE\", \"GLUCOSE\", \"CALCIUM\", \"AST\", \"ALT\", \"ALKPHOS\", \"PROT\", \"BILITOT\", \"EGFR\",   PT/INR: No results found for: \"PT\", \"INR\",   Magnesium: No components found for: \"MAG\",  Phosphorous: No results found for: \"PHOS\"    Patient Instructions   It was nice to see you today. Please have labs done in 6 months  Talk to Dr. Rosen to see if it is worth it to see a neurologist for your constellation of symptoms     Portions of the record may have been created with voice recognition software. Occasional wrong word or \"sound a like\" substitutions may have occurred due to the inherent limitations of voice recognition software. Read the chart carefully and recognize, using context, where substitutions have occurred.    If you have any questions, please contact the dictating provider.  "

## 2024-03-25 ENCOUNTER — TELEPHONE (OUTPATIENT)
Dept: FAMILY MEDICINE CLINIC | Facility: CLINIC | Age: 69
End: 2024-03-25

## 2024-03-25 DIAGNOSIS — R26.9 GAIT DISTURBANCE: Primary | ICD-10-CM

## 2024-03-25 NOTE — TELEPHONE ENCOUNTER
Stephania from [facility] states that Radha had a fall and has elbow pain altho radha states that she sees Dr Guillaume this week so she will address it with him..... Stephania is concerned that patient has a very short Gait and that she thinks a referral to Neurology is suggested......... Also she states that the patient still continues with bowel incontinence even tho the used to see a gastro she states she does not anymore because they weren't doing anything for her

## 2024-03-28 ENCOUNTER — APPOINTMENT (OUTPATIENT)
Dept: RADIOLOGY | Facility: MEDICAL CENTER | Age: 69
End: 2024-03-28
Payer: MEDICARE

## 2024-03-28 ENCOUNTER — OFFICE VISIT (OUTPATIENT)
Dept: OBGYN CLINIC | Facility: CLINIC | Age: 69
End: 2024-03-28

## 2024-03-28 VITALS
HEIGHT: 61 IN | BODY MASS INDEX: 35.12 KG/M2 | HEART RATE: 98 BPM | SYSTOLIC BLOOD PRESSURE: 145 MMHG | DIASTOLIC BLOOD PRESSURE: 81 MMHG | WEIGHT: 186 LBS

## 2024-03-28 DIAGNOSIS — S52.202D CLOSED FRACTURE OF SHAFT OF LEFT ULNA WITH ROUTINE HEALING, UNSPECIFIED FRACTURE MORPHOLOGY, SUBSEQUENT ENCOUNTER: ICD-10-CM

## 2024-03-28 DIAGNOSIS — S52.202D CLOSED FRACTURE OF SHAFT OF LEFT ULNA WITH ROUTINE HEALING, UNSPECIFIED FRACTURE MORPHOLOGY, SUBSEQUENT ENCOUNTER: Primary | ICD-10-CM

## 2024-03-28 PROCEDURE — 99024 POSTOP FOLLOW-UP VISIT: CPT | Performed by: ORTHOPAEDIC SURGERY

## 2024-03-28 PROCEDURE — 73090 X-RAY EXAM OF FOREARM: CPT

## 2024-03-28 NOTE — PROGRESS NOTES
Assessment/Plan:    No problem-specific Assessment & Plan notes found for this encounter.       Diagnoses and all orders for this visit:    Closed fracture of shaft of left ulna with routine healing, unspecified fracture morphology, subsequent encounter  -     XR forearm 2 vw left; Future          The patient has no tenderness along the fracture site.  Radiographically, it is healing more.  Would recommend continuation of splint.  Follow-up in 2 months for reevaluation no x-rays of left forearm-2 views    Subjective:      Patient ID: Radha Covington is a 68 y.o. female.    HPI    The patient is approximately 9 weeks status post fracture of her left ulnar shaft after a fall.  She continues to wear the splint.  She offers no major complaints of pain along the fracture site.  She denies any numbness or tingling.  She denies any fever or chills    The following portions of the patient's history were reviewed and updated as appropriate: allergies, current medications, past family history, past medical history, past social history, past surgical history, and problem list.    Review of Systems   Constitutional:  Negative for chills, fever and unexpected weight change.   HENT:  Negative for hearing loss, nosebleeds and sore throat.    Eyes:  Negative for pain, redness and visual disturbance.   Respiratory:  Negative for cough, shortness of breath and wheezing.    Cardiovascular:  Negative for chest pain, palpitations and leg swelling.   Gastrointestinal:  Negative for abdominal pain, nausea and vomiting.   Endocrine: Negative for polydipsia and polyuria.   Genitourinary:  Negative for dysuria and hematuria.   Musculoskeletal:  Positive for gait problem and myalgias. Negative for arthralgias, back pain, joint swelling, neck pain and neck stiffness.        As noted in HPI   Skin:  Negative for rash and wound.   Neurological:  Negative for dizziness, numbness and headaches.   Psychiatric/Behavioral:  Negative for decreased  "concentration and suicidal ideas. The patient is not nervous/anxious.          Objective:      /81   Pulse 98   Ht 5' 1\" (1.549 m)   Wt 84.4 kg (186 lb)   LMP  (LMP Unknown)   BMI 35.14 kg/m²          Physical Exam      Left upper extremity was neurovascular intact.  Fingers are pink and mobile.  Compartments are soft.  There is no pain to deep palpation over the fracture site.  Passive rotation was intact.  Negative Tinel's.  Negative Phalen's test    X-rays show the fracture to be healing with more more callus formation.  The fracture site is starting to lessen    "

## 2024-03-29 ENCOUNTER — TELEPHONE (OUTPATIENT)
Dept: FAMILY MEDICINE CLINIC | Facility: CLINIC | Age: 69
End: 2024-03-29

## 2024-03-29 NOTE — TELEPHONE ENCOUNTER
Patient has had 2 falls within the home since starting PT - has an abrasion on knee - will continue to work on her balance and  increasing patient's strength - any other recommendations?

## 2024-04-23 ENCOUNTER — TELEPHONE (OUTPATIENT)
Age: 69
End: 2024-04-23

## 2024-04-23 ENCOUNTER — HOSPITAL ENCOUNTER (OUTPATIENT)
Dept: RADIOLOGY | Facility: HOSPITAL | Age: 69
Discharge: HOME | End: 2024-04-23
Attending: FAMILY MEDICINE
Payer: MEDICARE
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
## 2024-04-23 DIAGNOSIS — M79.602 LEFT ARM PAIN: ICD-10-CM

## 2024-04-23 DIAGNOSIS — M25.522 LEFT ELBOW PAIN: ICD-10-CM

## 2024-04-23 DIAGNOSIS — M25.522 LEFT ELBOW PAIN: Primary | ICD-10-CM

## 2024-04-23 PROCEDURE — 73080 X-RAY EXAM OF ELBOW: CPT

## 2024-04-23 PROCEDURE — 73090 X-RAY EXAM OF FOREARM: CPT

## 2024-04-23 NOTE — TELEPHONE ENCOUNTER
Tried to reach patient at the [phone] # - not accepting calls from unknown # - LM on  [phone] - that an order will be place for x-ray

## 2024-04-23 NOTE — TELEPHONE ENCOUNTER
Pt's home health nurse called. Pt had fall 2 wks ago. Left elbow pain increasing. 7/10. Nurse is requesting xr left elbow and forearm. Please call pt directly with any other recommendations.

## 2024-05-14 ENCOUNTER — TELEPHONE (OUTPATIENT)
Dept: NEUROLOGY | Facility: CLINIC | Age: 69
End: 2024-05-14

## 2024-05-14 NOTE — TELEPHONE ENCOUNTER
LMOM to confirm pt's appt w/ Dr. Jim in [place] on 5/23/24 at 11. Advised pt to arrive 15 minutes prior to check in with the .

## 2024-05-23 ENCOUNTER — CONSULT (OUTPATIENT)
Dept: NEUROLOGY | Facility: CLINIC | Age: 69
End: 2024-05-23
Payer: MEDICARE

## 2024-05-23 VITALS
DIASTOLIC BLOOD PRESSURE: 70 MMHG | SYSTOLIC BLOOD PRESSURE: 122 MMHG | BODY MASS INDEX: 34.59 KG/M2 | WEIGHT: 183.2 LBS | OXYGEN SATURATION: 94 % | HEART RATE: 91 BPM | HEIGHT: 61 IN | TEMPERATURE: 98.6 F

## 2024-05-23 DIAGNOSIS — M62.81 MUSCLE WEAKNESS (GENERALIZED): ICD-10-CM

## 2024-05-23 DIAGNOSIS — R29.6 FREQUENT FALLS: ICD-10-CM

## 2024-05-23 DIAGNOSIS — R26.9 GAIT DISTURBANCE: Primary | ICD-10-CM

## 2024-05-23 PROCEDURE — 99204 OFFICE O/P NEW MOD 45 MIN: CPT | Performed by: PSYCHIATRY & NEUROLOGY

## 2024-05-23 NOTE — PROGRESS NOTES
Patient ID: Radha Covington is a 68 y.o. female.    Assessment/Plan:    No problem-specific Assessment & Plan notes found for this encounter.       {Assess/PlanSmartLinks:77294}       Subjective:    HPI    {[facility] Neurology HPI texts:91028}    {Common ambulatory SmartLinks:54193}         Objective:    There were no vitals taken for this visit.    Physical Exam    Neurological Exam      ROS:    Review of Systems

## 2024-05-23 NOTE — PROGRESS NOTES
Patient ID: Radha Covington is a 68 y.o. female.    Assessment/Plan:    Muscle weakness (generalized)  This patient has longstanding history of chronic neck pain, lower back pain, lupus/mixed connective tissue disease, comes in here for evaluation for progressive ambulatory dysfunction, and recurrent falls.  Does report weakness in both upper and lower extremities, weakness is mostly subjective, I did not appreciate any objective muscle weakness on exam today.  Very minimal length dependent sensory loss was noted, can be considered normal for her age, with some asymmetry in the L4-5 distribution on the right compared to the left.  Does report bladder and bowel urgency/incontinence, has history of microcolitis, bowel issues could be related to that.  However, considering progressive issues with walking, recurrent falls, reported urinary urgency, I do think she would benefit from imaging of her neuraxis, recommended MRI of the brain, cervical and lumbar spine.  Differential is spinal stenosis versus normal pressure hydrocephalus.  In addition, recommended some additional blood work as noted below, including sed rate, CK, B12, MMA, and SPEP.  Lastly, schedule patient for electrodiagnostic testing to rule out a myopathic process, suspicion is low.  Fall precautions were discussed in the meantime, patient is currently getting home PT and OT which she feels is helpful.  follow up in 3-4 months.       Diagnoses and all orders for this visit:    Muscle weakness (generalized)    Gait disturbance  -     Ambulatory Referral to Neurology  -     Sedimentation rate, automated; Future  -     Creatine Kinase, Total; Future  -     Vitamin B12; Future  -     Methylmalonic acid, serum; Future  -     Protein electrophoresis, serum; Future  -     EMG 1 Upper/1 Lower Neuropathy; Future  -     MRI cervical spine wo contrast; Future  -     MRI lumbar spine without contrast; Future  -     MRI brain without contrast; Future    Frequent  falls         Subjective:    HPI    I had the pleasure of seeing your patient in neurology clinic for neuromuscular consultation.  As you know, patient is a 68-year-old woman, with diagnosis of mixed connective tissue disorder, chronic lower back pain with, CKD, who presents for evaluation for progressive gait dysfunction and muscle weakness.  Please allow me to summarize her history for the record.    Patient reports the symptoms have been going on for at least a few years, she has had number of falls over the years.  She feels her balance is not good, she tends to stumble here and there, and on occasions food feel her legs are giving out on her.  Most recent fall was in January 2024, she fractured her left wrist.  Weakness is in both  lower extremities and more recently, notes similar weakness in the upper extremities.  Patient does have chronic neck pain, back pain for more than 20 years, she uses a cane for assistance which does help her.  She denies any change in her vision, specifically no drooping of the eyes, double vision, blurry vision.  Speech and swallowing have been unaffected.  She denies any shortness of breath, at rest, with ADLs, or with exertion.  She does report loss of bladder and bowel control over the last few years, reports urinary urgency and frequency, and for bowels she usually feels a cramp in the lower abdomen, with sudden urgency to defecate.  She does report some numbness in her feet, denies any dysesthesias.    Does have osteoporosis, on Prolia, follows with rheumatology at [facility] for lupus/mixed connective tissue disease, currently on Plaquenil.    Besides this, has prior history of breast cancer, status postlumpectomy in 2017 and radiation, did not need any chemotherapy.  Has microscopic colitis, follows with GI, currently on budesonide and Imodium.    Labs:  March 2024 vitamin D normal 41.4.,CMP, CBC normal.  May 2023; TSH normal, C4 complement levels minimally high, C3  "normal, parathormone 130, vitamin D18.6.    CT head January 2024 decreased attenuation noted in periventricular and subcortical white matter, consistent with small vessel disease, does have ventriculomegaly, slightly out of proportion to cortical atrophy.    The following portions of the patient's history were reviewed and updated as appropriate: allergies, current medications, past family history, past medical history, past social history, past surgical history, and problem list.         Objective:    Blood pressure 122/70, pulse 91, temperature 98.6 °F (37 °C), temperature source Temporal, height 5' 1\" (1.549 m), weight 83.1 kg (183 lb 3.2 oz), SpO2 94%.    Physical Exam  On exam, patient was not in any acute distress, she was sitting comfortably in her chair.  HEENT was unremarkable.  Extremities did not reveal any edema.  She did have a splint on her left arm from her recent ulna fracture.    Neurologically, patient was awake and alert.  Speech was slightly soft-spoken, dysarthria.  On cranial nerve examination, patient did not have any ptosis at baseline, with sustained upward gaze.  Extraocular movements were normal and conjugate, without any diplopia, I did not appreciate any nystagmus.  There was no weakness of eye closure muscles.  No facial asymmetry or weakness, tongue was midline.    On motor examination, tone was normal in both upper and lower extremities, I did not appreciate any atrophy, or any other features of chronic neuromuscular disease.  Strength was normal in neck flexors, extensors, and all muscle groups in both upper extremities, except the left wrist was not tested because of her recent fracture and splint.  In the lower extremities, once again I did not appreciate any objective muscle weakness in hip flexors, knee extensors, knee flexors, ankle dorsiflexors, plantar flexors, inverters, everters as well as the toes.  Sensation was normal in the upper extremities, she endorsed decreased " sensation to pinprick and temperature in the lower extremities up to the ankles, more so on the right compared to the left in L4-5 distribution.  Proprioception was slightly impaired at the toes.  Reflexes were 2+ in the upper extremities at the bicep, right brachioradialis was 2+, left was not tested, triceps were 2+, knees were 2 on the right, left was 1, ankles were 2 bilaterally.  She ambulated with the help of a cane, ?  Magnetic gait with small steps and slight shuffle.      ROS:  I reviewed the below ROS and what is mentioned in HPI, the remainder of ROS was negative.    Review of Systems   Constitutional:  Positive for fatigue. Negative for appetite change and fever.   HENT: Negative.  Negative for hearing loss, tinnitus, trouble swallowing and voice change.    Eyes: Negative.  Negative for photophobia, pain and visual disturbance.   Respiratory: Negative.  Negative for shortness of breath.    Cardiovascular: Negative.  Negative for palpitations.   Gastrointestinal: Negative.  Negative for nausea and vomiting.   Endocrine: Negative.  Negative for cold intolerance.   Genitourinary: Negative.  Negative for dysuria, frequency and urgency.   Musculoskeletal:  Positive for gait problem (last fall 1/17/24- broke her left arm. leans side to side, shuffles feet). Negative for back pain, myalgias, neck pain and neck stiffness.   Skin: Negative.  Negative for rash.   Allergic/Immunologic: Negative.    Neurological:  Positive for weakness (b/l thighs). Negative for dizziness, tremors, seizures, syncope, facial asymmetry, speech difficulty, light-headedness, numbness and headaches.   Hematological: Negative.  Does not bruise/bleed easily.   Psychiatric/Behavioral: Negative.  Negative for confusion, hallucinations and sleep disturbance.    All other systems reviewed and are negative.

## 2024-05-24 PROBLEM — R26.9 GAIT DISTURBANCE: Status: ACTIVE | Noted: 2024-05-24

## 2024-05-24 PROBLEM — R29.6 FREQUENT FALLS: Status: ACTIVE | Noted: 2024-05-24

## 2024-05-24 PROBLEM — M62.81 MUSCLE WEAKNESS (GENERALIZED): Status: ACTIVE | Noted: 2024-05-24

## 2024-05-24 NOTE — ASSESSMENT & PLAN NOTE
This patient has longstanding history of chronic neck pain, lower back pain, lupus/mixed connective tissue disease, comes in here for evaluation for progressive ambulatory dysfunction, and recurrent falls.  Does report weakness in both upper and lower extremities, weakness is mostly subjective, I did not appreciate any objective muscle weakness on exam today.  Very minimal length dependent sensory loss was noted, can be considered normal for her age, with some asymmetry in the L4-5 distribution on the right compared to the left.  Does report bladder and bowel urgency/incontinence, has history of microcolitis, bowel issues could be related to that.  However, considering progressive issues with walking, recurrent falls, reported urinary urgency, I do think she would benefit from imaging of her neuraxis, recommended MRI of the brain, cervical and lumbar spine.  Differential is spinal stenosis versus normal pressure hydrocephalus.  In addition, recommended some additional blood work as noted below, including sed rate, CK, B12, MMA, and SPEP.  Lastly, schedule patient for electrodiagnostic testing to rule out a myopathic process, suspicion is low.  Fall precautions were discussed in the meantime, patient is currently getting home PT and OT which she feels is helpful.  follow up in 3-4 months.

## 2024-05-31 ENCOUNTER — OFFICE VISIT (OUTPATIENT)
Dept: OBGYN CLINIC | Facility: CLINIC | Age: 69
End: 2024-05-31
Payer: MEDICARE

## 2024-05-31 VITALS — BODY MASS INDEX: 34.55 KG/M2 | HEIGHT: 61 IN | WEIGHT: 183 LBS

## 2024-05-31 DIAGNOSIS — S52.202D CLOSED FRACTURE OF SHAFT OF LEFT ULNA WITH ROUTINE HEALING, UNSPECIFIED FRACTURE MORPHOLOGY, SUBSEQUENT ENCOUNTER: Primary | ICD-10-CM

## 2024-05-31 DIAGNOSIS — E78.5 HYPERLIPIDEMIA, UNSPECIFIED HYPERLIPIDEMIA TYPE: ICD-10-CM

## 2024-05-31 PROCEDURE — 99213 OFFICE O/P EST LOW 20 MIN: CPT | Performed by: ORTHOPAEDIC SURGERY

## 2024-05-31 RX ORDER — EZETIMIBE 10 MG/1
10 TABLET ORAL DAILY
Qty: 90 TABLET | Refills: 1 | Status: SHIPPED | OUTPATIENT
Start: 2024-05-31

## 2024-05-31 NOTE — PROGRESS NOTES
Assessment/Plan:   Diagnoses and all orders for this visit:    Closed fracture of shaft of left ulna with routine healing, unspecified fracture morphology, subsequent encounter  -     XR forearm 2 vw left; Future         Reviewed physical exam and updated imaging with patient at time of visit. Her x-rays and physical exam show her previous fracture to be healed. The patient may discontinue use of the splint. Weightbearing activities as tolerated. She will follow-up on an as needed basis if symptoms return. The patient expresses understanding and is in agreement with today's treatment plan.    Clinically and radiographically, the fracture is now healed.  There is full strength full motion.  No pain over the fracture site.  Rotation intact.  Continue home exercise program.  Follow-up on an as-needed basis.  If her condition changes, she would not hesitate to let us know      Subjective:   Patient ID: Radha Covington  1955     HPI  Patient is a 68 y.o. female who presents for follow-up evaluation of her left wrist. She sustained a ulnar shaft fracture on 1/17/2024. The patient has continued to wear the splint since the initial injury. On today's presentation, she denies pain over the fracture site. She reports some soreness in her elbow. She denies numbness or tingling.     The following portions of the patient's history were reviewed and updated as appropriate:  Past medical history, past surgical history, Family history, social history, current medications and allergies    Past Medical History:   Diagnosis Date    Arthritis     Breast cancer (Formerly [facility]) 03/01/2017    right breast intraductal carcinoma performed at [facility]    Cancer (Formerly [facility]) 3/2017    rt. breast cancer    Chronic kidney disease     History of radiation therapy 08/01/2017    right breast    Hyperlipidemia     Lupus (Former[facility]) 2019    Diagnosis is lupus.    Urinary tract infection 2006       Past Surgical History:   Procedure Laterality Date    BREAST BIOPSY Left  1979    benign    BREAST EXCISIONAL BIOPSY Right 04/2017    intraductal carcinoma needle loc performed at [facility]    BREAST LUMPECTOMY Right 03/01/2017    BREAST SURGERY      CHOLECYSTECTOMY      COLONOSCOPY      ESOPHAGOGASTRODUODENOSCOPY      WA COLONOSCOPY FLX DX W/COLLJ SPEC WHEN PFRMD N/A 10/23/2017    Procedure: COLONOSCOPY;  Surgeon: Riky Fortune MD;  Location: [facility];  Service: Colorectal       Family History   Problem Relation Age of Onset    Breast cancer Sister 56    Uterine cancer Sister     BRCA1 Negative Sister     BRCA2 Negative Sister     Breast cancer Maternal Grandmother 75    Cancer Maternal Grandmother         Left breast cancer    Breast cancer Cousin 50    Heart disease Mother     Hypertension Mother     Cancer Father         Bladder cancer    Heart disease Father     Hypertension Father     Cancer Sister         Left breast cancer       Social History     Socioeconomic History    Marital status: /Civil Union     Spouse name: None    Number of children: None    Years of education: None    Highest education level: None   Occupational History    None   Tobacco Use    Smoking status: Never    Smokeless tobacco: Never   Vaping Use    Vaping status: Never Used   Substance and Sexual Activity    Alcohol use: Not Currently     Comment: rarely    Drug use: Never    Sexual activity: Yes     Partners: Male     Birth control/protection: Condom Male   Other Topics Concern    None   Social History Narrative    None     Social Determinants of Health     Financial Resource Strain: Low Risk  (1/5/2024)    Overall Financial Resource Strain (CARDIA)     Difficulty of Paying Living Expenses: Not hard at all   Food Insecurity: Not on file   Transportation Needs: No Transportation Needs (1/5/2024)    PRAPARE - Transportation     Lack of Transportation (Medical): No     Lack of Transportation (Non-Medical): No   Physical Activity: Not on file   Stress: Not on file   Social Connections: Not on file    Intimate Partner Violence: Not on file   Housing Stability: Not on file         Current Outpatient Medications:     azelastine (ASTELIN) 0.1 % nasal spray, 1 spray into each nostril daily Use in each nostril as directed, Disp: , Rfl:     DULoxetine (CYMBALTA) 30 mg delayed release capsule, Take 1 capsule (30 mg total) by mouth daily (Patient taking differently: Take 60 mg by mouth daily), Disp: 90 capsule, Rfl: 3    ezetimibe (ZETIA) 10 mg tablet, Take 1 tablet (10 mg total) by mouth daily, Disp: 90 tablet, Rfl: 3    hydroxychloroquine (PLAQUENIL) 200 mg tablet, Take 200 mg by mouth daily, Disp: , Rfl: 0    loperamide (IMODIUM) 2 mg/15 mL oral liquid, , Disp: , Rfl:     Multiple Vitamin (MULTI-VITAMIN DAILY PO), Take 500 mg by mouth daily , Disp: , Rfl:     timolol (TIMOPTIC) 0.5 % ophthalmic solution, Administer 1 drop to both eyes every 12 (twelve) hours, Disp: , Rfl:     Calcium Ascorbate 500 MG TABS, Take 500 mg by mouth 2 (two) times a day  (Patient not taking: Reported on 5/23/2024), Disp: , Rfl:     Cholecalciferol 125 MCG (5000 UT) capsule, Take 5,000 Units by mouth daily, Disp: , Rfl:     co-enzyme Q-10 30 MG capsule, Take 30 mg by mouth daily (Patient not taking: Reported on 5/23/2024), Disp: , Rfl:     fluticasone (FLONASE) 50 mcg/act nasal spray, 2 sprays into each nostril daily (Patient not taking: Reported on 5/23/2024), Disp: , Rfl:     methocarbamol (ROBAXIN) 500 mg tablet, Take 1 tablet (500 mg total) by mouth every 8 (eight) hours as needed (Pain, muscle spasms), Disp: 20 tablet, Rfl: 0    Allergies   Allergen Reactions    Bactrim [Sulfamethoxazole-Trimethoprim] Anaphylaxis and Swelling     Swelling of throat and nose; itching    Other      clear tape _blisters    Peaches, potatoes, apples- per allergy testing per pt    Molds & Smuts     Pollen Extract Itching    Wound Dressings Rash       Review of Systems   Constitutional:  Negative for chills, fever and unexpected weight change.   HENT:   "Negative for hearing loss, nosebleeds and sore throat.    Eyes:  Negative for pain, redness and visual disturbance.   Respiratory:  Negative for cough, shortness of breath and wheezing.    Cardiovascular:  Negative for chest pain, palpitations and leg swelling.   Gastrointestinal:  Negative for abdominal pain, nausea and vomiting.   Endocrine: Negative for polydipsia and polyuria.   Genitourinary:  Negative for dysuria and hematuria.   Skin:  Negative for rash and wound.   Neurological:  Negative for dizziness, numbness and headaches.   Psychiatric/Behavioral:  Negative for decreased concentration and suicidal ideas. The patient is not nervous/anxious.    All other systems reviewed and are negative.       Objective:  Ht 5' 1\" (1.549 m)   Wt 83 kg (183 lb)   LMP  (LMP Unknown)   BMI 34.58 kg/m²     Ortho Exam  left wrist -  Patient presents with no obvious anatomical deformity  Skin is warm and dry to touch with no signs of erythema, ecchymosis, infection  ROM WNL  No tenderness upon palpation  Strength: 5/5 throughout  No soft tissue swelling or effusion noted  Full FDS, FDP, extensor mechanisms are intact  Demonstrates normal wrist, elbow, and shoulder motion  Forearm compartments are soft and supple  2+ Distal radial pulse with brisk capillary refill to the fingers  Radial, median, ulnar motor and sensory distribution intact  Sensation to light touch intact distally      Physical Exam  HENT:      Head: Normocephalic and atraumatic.      Nose: Nose normal.   Eyes:      Conjunctiva/sclera: Conjunctivae normal.   Cardiovascular:      Rate and Rhythm: Normal rate.   Pulmonary:      Effort: Pulmonary effort is normal.   Musculoskeletal:      Cervical back: Neck supple.   Skin:     General: Skin is warm and dry.      Capillary Refill: Capillary refill takes less than 2 seconds.   Neurological:      Mental Status: She is alert and oriented to person, place, and time.   Psychiatric:         Mood and Affect: Mood " normal.         Behavior: Behavior normal.          Diagnostic Test Review:    X-Ray of left forearm obtained on 5/31/2024 were reviewed and demonstrate healed midshaft ulnar fracture.      Procedures   None performed.       Scribe Attestation      I,:  Maria T Adame am acting as a scribe while in the presence of the attending physician.:       I,:  Junito Guillaume, DO personally performed the services described in this documentation    as scribed in my presence.:

## 2024-06-03 ENCOUNTER — HOSPITAL ENCOUNTER (OUTPATIENT)
Dept: MRI IMAGING | Facility: HOSPITAL | Age: 69
Discharge: HOME | End: 2024-06-03
Attending: PSYCHIATRY & NEUROLOGY
Payer: MEDICARE

## 2024-06-03 DIAGNOSIS — R26.9 GAIT DISTURBANCE: ICD-10-CM

## 2024-06-03 PROCEDURE — 70551 MRI BRAIN STEM W/O DYE: CPT

## 2024-06-07 ENCOUNTER — HOSPITAL ENCOUNTER (OUTPATIENT)
Dept: MRI IMAGING | Facility: HOSPITAL | Age: 69
Discharge: HOME | End: 2024-06-07
Attending: PSYCHIATRY & NEUROLOGY
Payer: MEDICARE

## 2024-06-07 DIAGNOSIS — R26.9 GAIT DISTURBANCE: ICD-10-CM

## 2024-06-07 PROCEDURE — 72148 MRI LUMBAR SPINE W/O DYE: CPT

## 2024-06-07 PROCEDURE — 72141 MRI NECK SPINE W/O DYE: CPT

## 2024-06-13 ENCOUNTER — HOSPITAL ENCOUNTER (OUTPATIENT)
Dept: NEUROLOGY | Facility: CLINIC | Age: 69
End: 2024-06-13
Payer: MEDICARE

## 2024-06-13 DIAGNOSIS — R26.9 GAIT DISTURBANCE: ICD-10-CM

## 2024-06-13 PROBLEM — G62.9 SENSORY NEUROPATHY: Status: ACTIVE | Noted: 2024-06-13

## 2024-06-13 PROBLEM — M54.17 LUMBOSACRAL RADICULOPATHY: Status: ACTIVE | Noted: 2024-06-13

## 2024-06-13 PROCEDURE — 95911 NRV CNDJ TEST 9-10 STUDIES: CPT | Performed by: PSYCHIATRY & NEUROLOGY

## 2024-06-13 PROCEDURE — 95886 MUSC TEST DONE W/N TEST COMP: CPT | Performed by: PSYCHIATRY & NEUROLOGY

## 2024-06-16 ENCOUNTER — APPOINTMENT (OUTPATIENT)
Dept: LAB | Facility: HOSPITAL | Age: 69
End: 2024-06-16
Payer: MEDICARE

## 2024-06-16 DIAGNOSIS — R26.9 GAIT DISTURBANCE: ICD-10-CM

## 2024-06-16 LAB
CK SERPL-CCNC: 24 U/L (ref 26–192)
ERYTHROCYTE [SEDIMENTATION RATE] IN BLOOD: 43 MM/HOUR (ref 0–29)
VIT B12 SERPL-MCNC: 274 PG/ML (ref 180–914)

## 2024-06-16 PROCEDURE — 85652 RBC SED RATE AUTOMATED: CPT

## 2024-06-16 PROCEDURE — 82607 VITAMIN B-12: CPT

## 2024-06-16 PROCEDURE — 83918 ORGANIC ACIDS TOTAL QUANT: CPT

## 2024-06-16 PROCEDURE — 82550 ASSAY OF CK (CPK): CPT

## 2024-06-16 PROCEDURE — 36415 COLL VENOUS BLD VENIPUNCTURE: CPT

## 2024-06-16 PROCEDURE — 86334 IMMUNOFIX E-PHORESIS SERUM: CPT

## 2024-06-16 PROCEDURE — 84165 PROTEIN E-PHORESIS SERUM: CPT

## 2024-06-17 ENCOUNTER — TELEPHONE (OUTPATIENT)
Dept: NEUROLOGY | Facility: CLINIC | Age: 69
End: 2024-06-17

## 2024-06-17 NOTE — TELEPHONE ENCOUNTER
Zara from [facility] called with significant findings on MRI.  Please review.

## 2024-06-19 LAB
ALBUMIN SERPL ELPH-MCNC: 3.93 G/DL (ref 3.2–5.1)
ALBUMIN SERPL ELPH-MCNC: 57 % (ref 48–70)
ALPHA1 GLOB SERPL ELPH-MCNC: 0.3 G/DL (ref 0.15–0.47)
ALPHA1 GLOB SERPL ELPH-MCNC: 4.3 % (ref 1.8–7)
ALPHA2 GLOB SERPL ELPH-MCNC: 0.81 G/DL (ref 0.42–1.04)
ALPHA2 GLOB SERPL ELPH-MCNC: 11.7 % (ref 5.9–14.9)
BETA GLOB ABNORMAL SERPL ELPH-MCNC: 0.47 G/DL (ref 0.31–0.57)
BETA1 GLOB SERPL ELPH-MCNC: 6.8 % (ref 4.7–7.7)
BETA2 GLOB SERPL ELPH-MCNC: 6.6 % (ref 3.1–7.9)
BETA2+GAMMA GLOB SERPL ELPH-MCNC: 0.46 G/DL (ref 0.2–0.58)
GAMMA GLOB ABNORMAL SERPL ELPH-MCNC: 0.94 G/DL (ref 0.4–1.66)
GAMMA GLOB SERPL ELPH-MCNC: 13.6 % (ref 6.9–22.3)
IGG/ALB SER: 1.33 {RATIO} (ref 1.1–1.8)
INTERPRETATION UR IFE-IMP: NORMAL
PROT PATTERN SERPL ELPH-IMP: NORMAL
PROT SERPL-MCNC: 6.9 G/DL (ref 6.4–8.2)

## 2024-06-19 PROCEDURE — 86334 IMMUNOFIX E-PHORESIS SERUM: CPT | Performed by: STUDENT IN AN ORGANIZED HEALTH CARE EDUCATION/TRAINING PROGRAM

## 2024-06-19 PROCEDURE — 84165 PROTEIN E-PHORESIS SERUM: CPT | Performed by: STUDENT IN AN ORGANIZED HEALTH CARE EDUCATION/TRAINING PROGRAM

## 2024-06-21 ENCOUNTER — TELEPHONE (OUTPATIENT)
Age: 69
End: 2024-06-21

## 2024-06-21 LAB — METHYLMALONATE SERPL-SCNC: 216 NMOL/L (ref 0–378)

## 2024-06-21 NOTE — TELEPHONE ENCOUNTER
AUTUMN Weaver from [facility] called to report a missed appt yesterday for pt.  Pt states she was having GI issues and cancelled appt.  Just FYI for PCP.

## 2024-06-27 ENCOUNTER — TELEPHONE (OUTPATIENT)
Age: 69
End: 2024-06-27

## 2024-06-27 NOTE — TELEPHONE ENCOUNTER
Called, did speak to patient. Advised PCP out of office until next week; suggested Eval at ER or Urgent Care. Patient is scheduled for a 6 month follow up in PCP's office 07/05/2024

## 2024-06-27 NOTE — TELEPHONE ENCOUNTER
Pts OT reports that pt told her she fell 2 times within the past 2 weeks. Pt states she didn't hit her head but reports a DISCOLORED bruise a long her rib cage and a hard hematoma on her buttock. Pt reports pain as well and OT reports the failed to go to ER, even after be told to go. OT is requesting if the PCP can put in an xray order for pt.   Please further advise  at   Lux Covington (Spouse)  [phone] (Home Phone)

## 2024-06-28 ENCOUNTER — OFFICE VISIT (OUTPATIENT)
Dept: URGENT CARE | Facility: MEDICAL CENTER | Age: 69
End: 2024-06-28
Payer: MEDICARE

## 2024-06-28 ENCOUNTER — APPOINTMENT (OUTPATIENT)
Dept: LAB | Facility: MEDICAL CENTER | Age: 69
End: 2024-06-28
Payer: MEDICARE

## 2024-06-28 ENCOUNTER — APPOINTMENT (OUTPATIENT)
Dept: RADIOLOGY | Facility: MEDICAL CENTER | Age: 69
End: 2024-06-28
Payer: MEDICARE

## 2024-06-28 VITALS
SYSTOLIC BLOOD PRESSURE: 126 MMHG | TEMPERATURE: 98.3 F | HEART RATE: 87 BPM | RESPIRATION RATE: 20 BRPM | OXYGEN SATURATION: 96 % | DIASTOLIC BLOOD PRESSURE: 60 MMHG

## 2024-06-28 DIAGNOSIS — S30.0XXA HEMATOMA OF LEFT BUTTOCK: ICD-10-CM

## 2024-06-28 DIAGNOSIS — S29.019A THORACIC MYOFASCIAL STRAIN, INITIAL ENCOUNTER: Primary | ICD-10-CM

## 2024-06-28 DIAGNOSIS — S29.9XXA CHEST INJURY, INITIAL ENCOUNTER: ICD-10-CM

## 2024-06-28 PROCEDURE — 71101 X-RAY EXAM UNILAT RIBS/CHEST: CPT

## 2024-06-28 PROCEDURE — G0463 HOSPITAL OUTPT CLINIC VISIT: HCPCS | Performed by: PHYSICIAN ASSISTANT

## 2024-06-28 PROCEDURE — 99213 OFFICE O/P EST LOW 20 MIN: CPT | Performed by: PHYSICIAN ASSISTANT

## 2024-06-28 NOTE — PATIENT INSTRUCTIONS
Take Tylenol as needed for pain  Apply heat or ice which ever feels better  Follow up with PCP if symptoms fail to improve    If tests have been performed at [facility], our office will contact you with results if changes need to be made to the care plan discussed with you at the visit.  You can review your full results on [facility] MyChart

## 2024-07-05 ENCOUNTER — OFFICE VISIT (OUTPATIENT)
Dept: FAMILY MEDICINE CLINIC | Facility: CLINIC | Age: 69
End: 2024-07-05
Payer: MEDICARE

## 2024-07-05 VITALS
DIASTOLIC BLOOD PRESSURE: 84 MMHG | SYSTOLIC BLOOD PRESSURE: 126 MMHG | BODY MASS INDEX: 34.74 KG/M2 | RESPIRATION RATE: 20 BRPM | HEART RATE: 84 BPM | WEIGHT: 184 LBS | TEMPERATURE: 97.7 F | HEIGHT: 61 IN

## 2024-07-05 DIAGNOSIS — R29.6 MULTIPLE FALLS: ICD-10-CM

## 2024-07-05 DIAGNOSIS — R26.9 GAIT DISTURBANCE: ICD-10-CM

## 2024-07-05 DIAGNOSIS — R26.89 BALANCE PROBLEM: ICD-10-CM

## 2024-07-05 DIAGNOSIS — S20.212A CONTUSION OF RIB ON LEFT SIDE, INITIAL ENCOUNTER: Primary | ICD-10-CM

## 2024-07-05 DIAGNOSIS — M32.9 LUPUS (HCC): ICD-10-CM

## 2024-07-05 DIAGNOSIS — M35.1 MIXED CONNECTIVE TISSUE DISEASE (HCC): ICD-10-CM

## 2024-07-05 DIAGNOSIS — E55.9 VITAMIN D DEFICIENCY: ICD-10-CM

## 2024-07-05 DIAGNOSIS — K52.839 MICROSCOPIC COLITIS, UNSPECIFIED MICROSCOPIC COLITIS TYPE: ICD-10-CM

## 2024-07-05 DIAGNOSIS — Z85.3 HISTORY OF BREAST CANCER: ICD-10-CM

## 2024-07-05 DIAGNOSIS — E78.5 HYPERLIPIDEMIA, UNSPECIFIED HYPERLIPIDEMIA TYPE: ICD-10-CM

## 2024-07-05 DIAGNOSIS — F32.A DEPRESSION, UNSPECIFIED DEPRESSION TYPE: ICD-10-CM

## 2024-07-05 DIAGNOSIS — N18.31 STAGE 3A CHRONIC KIDNEY DISEASE (HCC): ICD-10-CM

## 2024-07-05 PROCEDURE — 99214 OFFICE O/P EST MOD 30 MIN: CPT | Performed by: FAMILY MEDICINE

## 2024-07-05 PROCEDURE — G2211 COMPLEX E/M VISIT ADD ON: HCPCS | Performed by: FAMILY MEDICINE

## 2024-07-05 NOTE — PROGRESS NOTES
Assessment/Plan: Left rib pain status post fall x-ray reviewed is negative.  June 28 urgent care notes reviewed and appreciated    Multiple falls being evaluated for muscle weakness by neurology    Lupus with depression currently on Cymbalta    Microscopic colitis with rectal incontinence the patient is unable to tolerate Pepto-Bismol    Hyperlipidemia treated with Zetia 10 mg a lipid panel and a desirable profile    History of breast cancer in remission    Chronic kidney disease stage IIIa followed by nephrology    The patient does qualify for handicap parking due to orthopedic issues and gait abnormality    Depression treated with Cymbalta    Problem List Items Addressed This Visit     Mixed connective tissue disease (HCC)    Stage 3 chronic kidney disease (HCC)    Hyperlipidemia    Relevant Orders    CBC and differential    Comprehensive metabolic panel    Lipid Panel with Direct LDL reflex    Vitamin D deficiency    Relevant Orders    Vitamin D 25 hydroxy    Lupus (HCC)    Gait disturbance   Other Visit Diagnoses     Contusion of rib on left side, initial encounter    -  Primary    Balance problem        Multiple falls        Microscopic colitis, unspecified microscopic colitis type        History of breast cancer        Depression, unspecified depression type                 Diagnoses and all orders for this visit:    Contusion of rib on left side, initial encounter    Gait disturbance    Balance problem    Multiple falls    Lupus (HCC)    Mixed connective tissue disease (HCC)    Hyperlipidemia, unspecified hyperlipidemia type  -     CBC and differential; Future  -     Comprehensive metabolic panel; Future  -     Lipid Panel with Direct LDL reflex; Future    Microscopic colitis, unspecified microscopic colitis type    History of breast cancer    Stage 3a chronic kidney disease (HCC)    Depression, unspecified depression type    Vitamin D deficiency  -     Vitamin D 25 hydroxy; Future    Other orders  -      METAMUCIL FIBER PO; Take by mouth        No problem-specific Assessment & Plan notes found for this encounter.      PHQ-2/9 Depression Screening            Body mass index is 34.77 kg/m².    BMI Counseling: Body mass index is 34.77 kg/m². The BMI     Subjective:      Patient ID: Radha Covington is a 68 y.o. female.    Patient presents for 6-month checkup with gait abnormality        The following portions of the patient's history were reviewed and updated as appropriate:   She has a past medical history of Arthritis, Breast cancer (HCC) (03/01/2017), Cancer (HCC) (3/2017), Chronic kidney disease, History of radiation therapy (08/01/2017), Hyperlipidemia, Lupus (HCC) (2019), and Urinary tract infection (2006).,  does not have any pertinent problems on file.,   has a past surgical history that includes Cholecystectomy; Colonoscopy; Esophagogastroduodenoscopy; pr colonoscopy flx dx w/collj spec when pfrmd (N/A, 10/23/2017); Breast surgery; Breast lumpectomy (Right, 03/01/2017); Breast biopsy (Left, 1979); and Breast excisional biopsy (Right, 04/2017).,  family history includes BRCA1 Negative in her sister; BRCA2 Negative in her sister; Breast cancer (age of onset: 50) in her cousin; Breast cancer (age of onset: 56) in her sister; Breast cancer (age of onset: 75) in her maternal grandmother; Cancer in her father, maternal grandmother, and sister; Heart disease in her father and mother; Hypertension in her father and mother; Uterine cancer in her sister.,   reports that she has never smoked. She has never used smokeless tobacco. She reports that she does not currently use alcohol. She reports that she does not use drugs.,  is allergic to bactrim [sulfamethoxazole-trimethoprim], other, molds & smuts, pollen extract, and wound dressings..  Current Outpatient Medications   Medication Sig Dispense Refill   • METAMUCIL FIBER PO Take by mouth     • azelastine (ASTELIN) 0.1 % nasal spray 1 spray into each nostril daily Use in  "each nostril as directed     • Calcium Ascorbate 500 MG TABS Take 500 mg by mouth 2 (two) times a day  (Patient not taking: Reported on 5/23/2024)     • co-enzyme Q-10 30 MG capsule Take 30 mg by mouth daily (Patient not taking: Reported on 5/23/2024)     • DULoxetine (CYMBALTA) 30 mg delayed release capsule Take 1 capsule (30 mg total) by mouth daily (Patient taking differently: Take 60 mg by mouth daily) 90 capsule 3   • ezetimibe (ZETIA) 10 mg tablet TAKE 1 TABLET DAILY 90 tablet 1   • fluticasone (FLONASE) 50 mcg/act nasal spray 2 sprays into each nostril daily     • hydroxychloroquine (PLAQUENIL) 200 mg tablet Take 200 mg by mouth daily  0   • loperamide (IMODIUM) 2 mg/15 mL oral liquid      • Multiple Vitamin (MULTI-VITAMIN DAILY PO) Take 500 mg by mouth daily      • timolol (TIMOPTIC) 0.5 % ophthalmic solution Administer 1 drop to both eyes every 12 (twelve) hours       No current facility-administered medications for this visit.       Review of Systems   Constitutional:  Negative for chills and fever.   HENT:  Negative for ear pain and sore throat.    Eyes:  Negative for pain and visual disturbance.   Respiratory:  Negative for cough and shortness of breath.    Cardiovascular:  Negative for chest pain and palpitations.   Gastrointestinal:  Negative for abdominal pain and vomiting.   Genitourinary:  Negative for dysuria and hematuria.   Musculoskeletal:  Positive for gait problem. Negative for arthralgias and back pain.   Skin:  Negative for color change and rash.   Neurological:  Positive for weakness. Negative for seizures and syncope.   All other systems reviewed and are negative.        Objective:    /84   Pulse 84   Temp 97.7 °F (36.5 °C)   Resp 20   Ht 5' 1\" (1.549 m)   Wt 83.5 kg (184 lb)   LMP  (LMP Unknown)   BMI 34.77 kg/m²   Body mass index is 34.77 kg/m².     Physical Exam  Constitutional:       Appearance: Normal appearance. She is well-developed. She is obese.   HENT:      Head: " Normocephalic and atraumatic.      Right Ear: Tympanic membrane, ear canal and external ear normal.      Left Ear: Tympanic membrane, ear canal and external ear normal.      Nose: Nose normal.      Mouth/Throat:      Mouth: Mucous membranes are moist.      Pharynx: Oropharynx is clear.   Eyes:      Extraocular Movements: Extraocular movements intact.      Conjunctiva/sclera: Conjunctivae normal.      Pupils: Pupils are equal, round, and reactive to light.   Cardiovascular:      Rate and Rhythm: Normal rate and regular rhythm.      Pulses: Normal pulses.      Heart sounds: Normal heart sounds.   Pulmonary:      Effort: Pulmonary effort is normal.      Breath sounds: Normal breath sounds.   Abdominal:      General: Abdomen is flat. Bowel sounds are normal.      Palpations: Abdomen is soft.      Tenderness: There is no abdominal tenderness.   Musculoskeletal:         General: Normal range of motion.      Cervical back: Normal range of motion and neck supple.   Skin:     General: Skin is warm and dry.      Capillary Refill: Capillary refill takes less than 2 seconds.   Neurological:      General: No focal deficit present.      Mental Status: She is alert and oriented to person, place, and time.   Psychiatric:         Mood and Affect: Mood normal.         Behavior: Behavior normal.         Thought Content: Thought content normal.         Judgment: Judgment normal.

## 2024-08-01 ENCOUNTER — OFFICE VISIT (OUTPATIENT)
Dept: NEUROLOGY | Facility: CLINIC | Age: 69
End: 2024-08-01
Payer: MEDICARE

## 2024-08-01 VITALS
HEART RATE: 83 BPM | DIASTOLIC BLOOD PRESSURE: 82 MMHG | OXYGEN SATURATION: 98 % | TEMPERATURE: 99.1 F | WEIGHT: 184 LBS | SYSTOLIC BLOOD PRESSURE: 134 MMHG | HEIGHT: 61 IN | BODY MASS INDEX: 34.74 KG/M2

## 2024-08-01 DIAGNOSIS — R26.9 GAIT DISTURBANCE: ICD-10-CM

## 2024-08-01 DIAGNOSIS — G91.2 NPH (NORMAL PRESSURE HYDROCEPHALUS) (HCC): Primary | ICD-10-CM

## 2024-08-01 PROCEDURE — G2211 COMPLEX E/M VISIT ADD ON: HCPCS | Performed by: PSYCHIATRY & NEUROLOGY

## 2024-08-01 PROCEDURE — 99214 OFFICE O/P EST MOD 30 MIN: CPT | Performed by: PSYCHIATRY & NEUROLOGY

## 2024-08-01 NOTE — PROGRESS NOTES
Ambulatory Visit  Name: Radha Covington      : 1955      MRN: [id number]  Encounter Provider: Kyle Jim MD  Encounter Date: 2024   Encounter department: NEUROLOGY [facility]    Assessment & Plan   1. NPH (normal pressure hydrocephalus) (HCC)  Assessment & Plan:  This patient has had issues with her ambulation for the last few years, mostly balance has been affected with shuffling gait.  Workup as noted below showed very mild axonal neuropathy on her electrodiagnostic testing, which is not significant to cause significant ambulatory dysfunction.  Does have chronic L5 radiculopathy in the right lower extremity as well.   Blood work has been normal, including normal CK, mildly decreased B12 was noted, on supplements, remainder of the labs as noted below have been normal.  MRI of the cervical spine and lumbar spine were discussed with the patient and her , does not have any significant structural pathology that would explain her progressive decline in the gait, however brain MRI did show enlarged ventricles with some transependymal edema, without any significant white matter disease, or cortical atrophy supporting the diagnosis of normal pressure hydrocephalus.  I discussed these results with the patient and her , discussed an evaluation in neurosurgery with a large-volume tap, they are agreeable, referral was provided today.    They had number of questions today, that were answered to their satisfaction, they will return to see me in 3-4 months.  Thank you for allowing me to participate in her care.    Orders:  -     Ambulatory Referral to Neurosurgery; Future  2. Gait disturbance      History of Present Illness     Radha Covington is a 68 y.o. female who presents here for follow-up in neuromuscular clinic for ambulatory dysfunction.  She was last evaluated by me in May, now returns for follow-up.    As you know, patient has prior history of mixed connective tissue disease,  "chronic lower back pain, CKD, has had issues with her gait for at least a few years, she has a number of falls over the years.  Mostly tends to stumble here and there, and occasionally \"feels the  Giving out on her.  Since her last visit, patient had workup as noted below including MRI of the cervical spine, lumbar spine, brain, EMG, some blood work as noted below.  She is here with her  to discuss the results.  From a symptom standpoint, there has not been any significant change, continues to have issues with balance, uses a cane inside the house for assistance, has been doing physical therapy, has noted minimal improvement.  Denies any numbness in her feet, denies any significant lower back pain or neck pain, does have urinary urgency and frequency.  Memory has been minimally affected, would ask repeated questions.    Images and workup were discussed with the patient and her  today.    MRI C spine: June 2024:  Cervical spondylosis associated with spinal canal and neural foraminal narrowing most notably at the C5-C6 and C6-C7 levels appears similar to the 1/17/2024 CT cervical spine.     MRI brain: June 2024: Altered signal involving white matter described above is a nonspecific finding often related to chronic small vessel white matter ischemic disease but in this instance could relate in part to increased interstitial fluid associated with normal pressure hydrocephalus as there is asymmetric enlargement of lateral and 3rd ventricles relative to size of cortical sulci at the convexities.    MRI L spine: June 2024: moderate chronic L2 compression fracture. Slight bony retropulsion of the posterior superior margin of the vertebral body into the anterior epidural space with mild annular bulging at L2-3 resulting in mild canal stenosis.  Mild noncompressive lumbar degenerative change at L4-5 and L5-S1.    EMG June 2024: mild, chronic L5/S1 radiculopathy on the right side. Mild/ early, axonal sensory " polyneuropathy.    Labs: June 2024: Sed rate 43, has been mildly elevated since  2020, B12: 274, MMA normal, Spep neg, CK normal.      Sed Rate   Latest Ref Rng 0 - 29 mm/hour   10/22/2019 24 (H)    5/21/2020 20    10/6/2020 31 (H)    3/1/2021 36 (H)    12/14/2021 33 (H)    8/23/2022 38 (H)    6/16/2024 43 (H)         PRIOR hx from initial evaluation in May 2024  and Workup:  Patient reports the symptoms have been going on for at least a few years, she has had number of falls over the years.  She feels her balance is not good, she tends to stumble here and there, and on occasions food feel her legs are giving out on her.  Most recent fall was in January 2024, she fractured her left wrist.  Weakness is in both  lower extremities and more recently, notes similar weakness in the upper extremities.  Patient does have chronic neck pain, back pain for more than 20 years, she uses a cane for assistance which does help her.  She denies any change in her vision, specifically no drooping of the eyes, double vision, blurry vision.  Speech and swallowing have been unaffected.  She denies any shortness of breath, at rest, with ADLs, or with exertion.  She does report loss of bladder and bowel control over the last few years, reports urinary urgency and frequency, and for bowels she usually feels a cramp in the lower abdomen, with sudden urgency to defecate.  She does report some numbness in her feet, denies any dysesthesias.     Does have osteoporosis, on Prolia, follows with rheumatology at [facility] for lupus/mixed connective tissue disease, currently on Plaquenil.     Besides this, has prior history of breast cancer, status postlumpectomy in 2017 and radiation, did not need any chemotherapy.  Has microscopic colitis, follows with GI, currently on budesonide and Imodium.     Labs:  March 2024 vitamin D normal 41.4.,CMP, CBC normal.  May 2023; TSH normal, C4 complement levels minimally high, C3 normal, parathormone 130,  "vitamin D18.6.     CT head January 2024 decreased attenuation noted in periventricular and subcortical white matter, consistent with small vessel disease, does have ventriculomegaly, slightly out of proportion to cortical atrophy.    I reviewed the below ROS and what is mentioned in HPI, the remainder of ROS was negative.  Review of Systems   Constitutional:  Negative for appetite change, fatigue and fever.   HENT: Negative.  Negative for hearing loss, tinnitus, trouble swallowing and voice change.    Eyes: Negative.  Negative for photophobia, pain and visual disturbance.   Respiratory: Negative.  Negative for shortness of breath.    Cardiovascular: Negative.  Negative for palpitations.   Gastrointestinal: Negative.  Negative for nausea and vomiting.   Endocrine: Negative.  Negative for cold intolerance.   Genitourinary: Negative.  Negative for dysuria, frequency and urgency.   Musculoskeletal:  Positive for gait problem (balance issues). Negative for back pain, myalgias, neck pain and neck stiffness.   Skin: Negative.  Negative for rash.   Allergic/Immunologic: Negative.    Neurological:  Positive for weakness (b/l legs- worse). Negative for dizziness, tremors, seizures, syncope, facial asymmetry, speech difficulty, light-headedness, numbness and headaches.   Hematological: Negative.  Does not bruise/bleed easily.   Psychiatric/Behavioral: Negative.  Negative for confusion, hallucinations and sleep disturbance.    All other systems reviewed and are negative.      Objective     /82 (BP Location: Left arm, Patient Position: Sitting, Cuff Size: Adult)   Pulse 83   Temp 99.1 °F (37.3 °C) (Temporal)   Ht 5' 1\" (1.549 m)   Wt 83.5 kg (184 lb)   LMP  (LMP Unknown)   SpO2 98%   BMI 34.77 kg/m²     Physical Exam  On general exam, she was not in any distress  HEENT was unremarkable. Did have B/l lower extremity edema.     Neurologically, patient was awake and alert.   Speech was fluent, no dysarthria or aphasia. "   On motor examination, tone was normal, strength was normal in neck flexors, extensors, and all muscle groups in both upper extremities, except the left wrist, which was limited due to pain due to her prior fracture earlier this year.  In the lower extremities, no weakness was noted in hip flexors, knee extensors, knee flexors, or the ankles.  Sensation was normal in the upper extremities, minimal sensory loss was noted to pinprick and temperature up to ankles, more so on the right compared to the left and L4-5 distribution, proprioception was slightly impaired at the toes.  Reflexes were 2 in the upper extremities, knees were 2 on the right, left was 1, ankles were 2 bilaterally.  She needed help to get up from a sitting position, and ambulated with small steps with a slight shuffle, ?  Magnetic gait.       Administrative Statements

## 2024-08-01 NOTE — ASSESSMENT & PLAN NOTE
This patient has had issues with her ambulation for the last few years, mostly balance has been affected with shuffling gait.  Workup as noted below showed very mild axonal neuropathy on her electrodiagnostic testing, which is not significant to cause significant ambulatory dysfunction.  Does have chronic L5 radiculopathy in the right lower extremity as well.   Blood work has been normal, including normal CK, mildly decreased B12 was noted, on supplements, remainder of the labs as noted below have been normal.  MRI of the cervical spine and lumbar spine were discussed with the patient and her , does not have any significant structural pathology that would explain her progressive decline in the gait, however brain MRI did show enlarged ventricles with some transependymal edema, without any significant white matter disease, or cortical atrophy supporting the diagnosis of normal pressure hydrocephalus.  I discussed these results with the patient and her , discussed an evaluation in neurosurgery with a large-volume tap, they are agreeable, referral was provided today.    They had number of questions today, that were answered to their satisfaction, they will return to see me in 3-4 months.  Thank you for allowing me to participate in her care.

## 2024-08-06 ENCOUNTER — TELEPHONE (OUTPATIENT)
Age: 69
End: 2024-08-06

## 2024-08-06 DIAGNOSIS — G91.2 NPH (NORMAL PRESSURE HYDROCEPHALUS) (HCC): Primary | ICD-10-CM

## 2024-08-06 NOTE — TELEPHONE ENCOUNTER
Phone call placed to patients home number to discuss referral for NPH and the initial steps regarding scheduling of PT gait assessment and follow up with provider.  Voice mail was reached and this RN left a detailed message including reason for call, call back number, and this RN's name and role.  Pending call back.

## 2024-08-14 NOTE — TELEPHONE ENCOUNTER
PT CB REQUESTING TO SCHED NPH APPT.    INFORMED PT MESSAGE WOULD BE SENT TO NPH NURSE TO CB AND PROCEED WITH SCHEDULING.    PT CONFIRMED HOME PHONE IS BEST CONTACT NUMBER [phone] AND SHE WILL BE WAITING FOR A CB.    THANK YOU

## 2024-08-14 NOTE — TELEPHONE ENCOUNTER
This RN phoned patient back as was invovled with another pt when she phoned originally today.     Patient assisted with scheduling of NPH initial PT assessment and f/u provider appointment.   Address, date, and time provided.      Pt stated she has [facility] and that they have been doing PT.  This RN to reach out to ensure that Nurys is able to complete the NPH gait assessment evaluation.  After confirmation with Nurys then appointment for PT will be canceled.      Patient in agreement with plan and appreciative for assistance.

## 2024-08-16 ENCOUNTER — TELEPHONE (OUTPATIENT)
Age: 69
End: 2024-08-16

## 2024-08-16 NOTE — TELEPHONE ENCOUNTER
Cindy from [facility]  [phone] called to speak to nursing on gait assessment, warm transfer not available Stephania valdes .

## 2024-08-22 NOTE — TELEPHONE ENCOUNTER
NPH gait assessment FAX sent to [phone].  This RN to watch for completed form return and update for upcoming NPH appointment scheduled for 8/28/24.

## 2024-08-26 ENCOUNTER — TELEPHONE (OUTPATIENT)
Age: 69
End: 2024-08-26

## 2024-08-26 NOTE — TELEPHONE ENCOUNTER
Jemma with [facility] called to say patient missed her occupational therapy session on Saturday due to sickness.

## 2024-08-27 ENCOUNTER — TELEPHONE (OUTPATIENT)
Age: 69
End: 2024-08-27

## 2024-08-27 NOTE — TELEPHONE ENCOUNTER
This RN phoned [facility] to inquire about NPH gait assessment form that had been sent.  Informing that the patients appointment is now tomorrow and I was inquiring about the status. Message left and awaiting call back.

## 2024-08-27 NOTE — TELEPHONE ENCOUNTER
Call received from Cindy Nuno team informing this RN of their challenges and apologizing for delay.  NPH gait assessment to be Faxed no later than tomorrow morning.

## 2024-09-04 ENCOUNTER — OFFICE VISIT (OUTPATIENT)
Dept: NEUROSURGERY | Facility: CLINIC | Age: 69
End: 2024-09-04
Payer: MEDICARE

## 2024-09-04 VITALS
BODY MASS INDEX: 34.17 KG/M2 | WEIGHT: 181 LBS | HEIGHT: 61 IN | SYSTOLIC BLOOD PRESSURE: 138 MMHG | DIASTOLIC BLOOD PRESSURE: 84 MMHG | OXYGEN SATURATION: 98 % | HEART RATE: 87 BPM | TEMPERATURE: 98.1 F

## 2024-09-04 DIAGNOSIS — R27.8 GAIT DYSPRAXIA: ICD-10-CM

## 2024-09-04 PROCEDURE — 99204 OFFICE O/P NEW MOD 45 MIN: CPT | Performed by: NEUROLOGICAL SURGERY

## 2024-09-04 NOTE — PROGRESS NOTES
"Neurosurgery Office Note  Radha Covington 69 y.o. female MRN: [id number]      Assessment & Plan        Problem List Items Addressed This Visit       NPH (normal pressure hydrocephalus) (HCC)         Discussion:  Patient is a 69-year-old female with h/o breast cancer s/p R lumpectomy in remission, CKD, HLD, colitis with rectal incontinence, SLE who p/w several years of progressively worsening gait imbalance causing numerous falls (requires assistance, in wheelchair today), forgetfulness/cognitive dysfunction and urinary incontinence (\"wakes up all night to pee, has to wear diaper\").    Not on AC/AP. Non-smoker.    PT evaluation on 8/28/24 limited (unable to complete some of the objective gait and cognition measurements).     NPH score today 9/15.    MRI brain on 6/3/24 showed diffuse global cerebral atrophy with prominent sulci throughout bilateral hemispheres and mild ventriculomegaly, no significant transependymal edema (relatively unchanged from prior CTH accounting for discrepancies).     MRI cervical and lumbar spine showed mild chronic degenerative changes, no abnormal cord signal changes.    No focal deficits on exam.    I discussed extensively goals of any shunt surgery and likelihood of symptomatic improvement: according to the literature, gait and urinary symptoms are most likely to improve earlier; whereas cognitive dysfunction is least likely to improve, if at all. We also discussed expected postoperative recovery/hospitalization as well as potential risks and complications including but not limited to hemorrhage, stroke, seizure, new neurologic deficits, shunt malfunction, shunt infection (latter two complications are potentially life-threatening).     At this time, we will proceed with high-volume (30-40 cc) lumbar puncture then assess for objective (per PT) and subjective (per patient and/or family) improvement in symptoms per protocol.    All questions and concerns were addressed during this " visit.          CHIEF COMPLAINT    Chief Complaint   Patient presents with    Consult     New NPH pt referral 8/1/24, MRI Cervical Spine 6/7/2024, PT assessment scanned into media 8/28/24 from [facility] evaluation was performed on 8/27/24       HISTORY    History of Present Illness     69 y.o. year old female     HPI    See Discussion    REVIEW OF SYSTEMS    Review of Systems   Constitutional: Negative.    HENT:  Positive for tinnitus and trouble swallowing.    Eyes: Negative.    Respiratory: Negative.     Cardiovascular: Negative.    Gastrointestinal: Negative.    Endocrine: Negative.    Genitourinary:  Positive for urgency.        BB Urgency + Incontinence   Musculoskeletal:  Positive for gait problem.   Skin: Negative.    Allergic/Immunologic: Negative.    Neurological:  Positive for dizziness, speech difficulty (forgetful), weakness (walks) and light-headedness.   Hematological: Negative.    Psychiatric/Behavioral:  Positive for confusion and decreased concentration.          Meds/Allergies     Current Outpatient Medications   Medication Sig Dispense Refill    azelastine (ASTELIN) 0.1 % nasal spray 1 spray into each nostril daily Use in each nostril as directed      Calcium Ascorbate 500 MG TABS Take 500 mg by mouth 2 (two) times a day      Cholecalciferol (Vitamin D3) 125 MCG (5000 UT) CAPS Take 1 capsule by mouth daily      co-enzyme Q-10 30 MG capsule Take 30 mg by mouth daily      denosumab (PROLIA) 60 mg/mL Inject 60 mg under the skin      DULoxetine (CYMBALTA) 30 mg delayed release capsule Take 1 capsule (30 mg total) by mouth daily (Patient taking differently: Take 60 mg by mouth daily) 90 capsule 3    ezetimibe (ZETIA) 10 mg tablet TAKE 1 TABLET DAILY 90 tablet 1    fluticasone (FLONASE) 50 mcg/act nasal spray 2 sprays into each nostril daily      hydroxychloroquine (PLAQUENIL) 200 mg tablet Take 200 mg by mouth daily  0    loperamide (IMODIUM) 2 mg/15 mL oral liquid       METAMUCIL FIBER PO Take  by mouth      Multiple Vitamin (MULTI-VITAMIN DAILY PO) Take 500 mg by mouth daily       timolol (TIMOPTIC) 0.5 % ophthalmic solution Administer 1 drop to both eyes every 12 (twelve) hours       No current facility-administered medications for this visit.       Allergies   Allergen Reactions    Bactrim [Sulfamethoxazole-Trimethoprim] Anaphylaxis and Swelling     Swelling of throat and nose; itching    Other      clear tape _blisters    Peaches, potatoes, apples- per allergy testing per pt    Molds & Smuts     Pollen Extract Itching    Wound Dressings Rash       PAST HISTORY    Past Medical History:   Diagnosis Date    Arthritis     Breast cancer ([facility]) 03/01/2017    right breast intraductal carcinoma performed at [facility]    Cancer ([facility]) 3/2017    rt. breast cancer    Chronic kidney disease     History of radiation therapy 08/01/2017    right breast    Hyperlipidemia     Lupus ([facility]) 2019    Diagnosis is lupus.    Urinary tract infection 2006       Past Surgical History:   Procedure Laterality Date    BREAST BIOPSY Left 1979    benign    BREAST EXCISIONAL BIOPSY Right 04/2017    intraductal carcinoma needle loc performed at [facility]    BREAST LUMPECTOMY Right 03/01/2017    BREAST SURGERY      CHOLECYSTECTOMY      COLONOSCOPY      ESOPHAGOGASTRODUODENOSCOPY      IA COLONOSCOPY FLX DX W/COLLJ SPEC WHEN PFRMD N/A 10/23/2017    Procedure: COLONOSCOPY;  Surgeon: Riky Fortune MD;  Location: [facility];  Service: Colorectal       Social History     Tobacco Use    Smoking status: Never    Smokeless tobacco: Never   Vaping Use    Vaping status: Never Used   Substance Use Topics    Alcohol use: Not Currently     Comment: rarely    Drug use: Never       Family History   Problem Relation Age of Onset    Breast cancer Sister 56    Uterine cancer Sister     BRCA1 Negative Sister     BRCA2 Negative Sister     Breast cancer Maternal Grandmother 75    Cancer Maternal Grandmother         Left breast cancer    Breast cancer Cousin 50     "Heart disease Mother     Hypertension Mother     Cancer Father         Bladder cancer    Heart disease Father     Hypertension Father     Cancer Sister         Left breast cancer         The following portions of the patient's history were reviewed in this encounter and updated as appropriate: Past medical, surgical, family, and social history, as well as medications, allergies, and review of systems.      EXAM    Vitals:Blood pressure 138/84, pulse 87, temperature 98.1 °F (36.7 °C), temperature source Temporal, height 5' 1\" (1.549 m), weight 82.1 kg (181 lb), SpO2 98%.,Body mass index is 34.2 kg/m².     Physical Exam  Constitutional:       Appearance: Normal appearance.   HENT:      Head: Normocephalic and atraumatic.   Eyes:      Extraocular Movements: Extraocular movements intact and EOM normal.      Pupils: Pupils are equal, round, and reactive to light.   Cardiovascular:      Rate and Rhythm: Normal rate and regular rhythm.      Pulses: Normal pulses.      Heart sounds: Normal heart sounds.   Pulmonary:      Effort: Pulmonary effort is normal.      Breath sounds: Normal breath sounds.   Abdominal:      General: Abdomen is flat.      Palpations: Abdomen is soft.   Musculoskeletal:         General: Normal range of motion.      Cervical back: Normal range of motion.   Skin:     General: Skin is warm.   Neurological:      General: No focal deficit present.      Mental Status: She is alert and oriented to person, place, and time. Mental status is at baseline.      Motor: Motor strength is normal.  Psychiatric:         Mood and Affect: Mood normal.         Speech: Speech normal.         Behavior: Behavior normal.         Neurologic Exam     Mental Status   Oriented to person, place, and time.   Attention: normal.   Speech: speech is normal   Level of consciousness: alert  Mild cognitive decline (\"forgetful\")     Cranial Nerves     CN II   Visual fields full to confrontation.   Visual acuity: normal  Right visual " field deficit: none  Left visual field deficit: none     CN III, IV, VI   Pupils are equal, round, and reactive to light.  Extraocular motions are normal.   CN III: no CN III palsy  CN VI: no CN VI palsy  Nystagmus: none   Diplopia: none  Ophthalmoparesis: none  Upgaze: normal  Downgaze: normal  Conjugate gaze: present    CN V   Facial sensation intact.   Right facial sensation deficit: none  Left facial sensation deficit: none    CN VII   Facial expression full, symmetric.   Right facial weakness: none  Left facial weakness: none    CN VIII   CN VIII normal.     CN IX, X   CN IX normal.   CN X normal.   Palate: symmetric    CN XI   CN XI normal.   Right sternocleidomastoid strength: normal  Left sternocleidomastoid strength: normal  Right trapezius strength: normal  Left trapezius strength: normal    CN XII   CN XII normal.   Tongue deviation: none    Motor Exam   Muscle bulk: normal  Overall muscle tone: normal  Right arm pronator drift: absent  Left arm pronator drift: absent    Strength   Strength 5/5 throughout.     Sensory Exam   Light touch normal.     Gait, Coordination, and Reflexes     Reflexes   Reflexes 2+ except as noted. Progressively worsening gait dysfunction         MEDICAL DECISION MAKING    Imaging Studies:     No results found.    I have personally reviewed pertinent reports.   and I have personally reviewed pertinent films in PACS      Tino Gee M.D.  Neurosurgeon

## 2024-09-10 ENCOUNTER — TELEPHONE (OUTPATIENT)
Age: 69
End: 2024-09-10

## 2024-09-10 NOTE — TELEPHONE ENCOUNTER
This RN phoned patient and communicated with patient and her spouse.  Scheduling parameters discussed and this RN assisted with scheduling patient for LP as well as follow up appointment with PT, Neurosurgery for MOCA post LP and follow up with Dr. Gee on 10/8/2024.  Dates, times, addresses reviewed with patient and her spouse.  Patient and spouse stated that they see the appointments on My Chart and are aware to arrive at 0700 10/4/2024 at the main hospital entrance at the [facility] for the 0830 procedure time for LP.  This RN encourage call back to office with any additional questions or concerns that may arise.

## 2024-09-20 NOTE — TELEPHONE ENCOUNTER
Kaylah steele Last visit I discontinued the Linzess and started Amitiza. Did the Amitiza not work? The highest dose of Linzess is 290 mcg.

## 2024-09-25 RX ORDER — ACETAMINOPHEN 325 MG/1
650 TABLET ORAL EVERY 4 HOURS PRN
OUTPATIENT
Start: 2024-10-04

## 2024-10-04 ENCOUNTER — HOSPITAL ENCOUNTER (EMERGENCY)
Facility: HOSPITAL | Age: 69
Discharge: HOME | End: 2024-10-04
Attending: EMERGENCY MEDICINE
Payer: MEDICARE

## 2024-10-04 ENCOUNTER — HOSPITAL ENCOUNTER (OUTPATIENT)
Dept: RADIOLOGY | Facility: HOSPITAL | Age: 69
Discharge: HOME | End: 2024-10-04
Attending: NEUROLOGICAL SURGERY
Payer: MEDICARE

## 2024-10-04 VITALS
RESPIRATION RATE: 18 BRPM | TEMPERATURE: 97.5 F | SYSTOLIC BLOOD PRESSURE: 161 MMHG | HEART RATE: 79 BPM | DIASTOLIC BLOOD PRESSURE: 74 MMHG | OXYGEN SATURATION: 98 %

## 2024-10-04 VITALS
SYSTOLIC BLOOD PRESSURE: 169 MMHG | OXYGEN SATURATION: 100 % | DIASTOLIC BLOOD PRESSURE: 63 MMHG | HEIGHT: 61 IN | WEIGHT: 181 LBS | TEMPERATURE: 97.9 F | RESPIRATION RATE: 18 BRPM | HEART RATE: 88 BPM | BODY MASS INDEX: 34.17 KG/M2

## 2024-10-04 DIAGNOSIS — G91.2 NORMAL PRESSURE HYDROCEPHALUS (HCC): ICD-10-CM

## 2024-10-04 DIAGNOSIS — R55 VASOVAGAL NEAR SYNCOPE: Primary | ICD-10-CM

## 2024-10-04 LAB
APTT PPP: 31 SECONDS (ref 23–34)
INR PPP: 0.95 (ref 0.85–1.19)
PLATELET # BLD AUTO: 288 THOUSANDS/UL (ref 149–390)
PMV BLD AUTO: 8.8 FL (ref 8.9–12.7)
PROTHROMBIN TIME: 13.4 SECONDS (ref 12.3–15)

## 2024-10-04 PROCEDURE — 62329 THER SPI PNXR CSF FLUOR/CT: CPT

## 2024-10-04 PROCEDURE — 99283 EMERGENCY DEPT VISIT LOW MDM: CPT

## 2024-10-04 PROCEDURE — 85049 AUTOMATED PLATELET COUNT: CPT | Performed by: RADIOLOGY

## 2024-10-04 PROCEDURE — 85730 THROMBOPLASTIN TIME PARTIAL: CPT | Performed by: RADIOLOGY

## 2024-10-04 PROCEDURE — 99284 EMERGENCY DEPT VISIT MOD MDM: CPT | Performed by: EMERGENCY MEDICINE

## 2024-10-04 PROCEDURE — 85610 PROTHROMBIN TIME: CPT | Performed by: RADIOLOGY

## 2024-10-04 NOTE — ED PROVIDER NOTES
Final diagnoses:   Vasovagal near syncope     ED Disposition       ED Disposition   Discharge    Condition   Stable    Date/Time   Fri Oct 4, 2024  2:53 PM    Comment   Radha Covington discharge to home/self care.                   Assessment & Plan       Medical Decision Making  68 yo F with lightheaded, feeling clammy while attempting to have a BM after LP performed and fluid removed for a hx of NPH.  She states all symptoms resolved at this point, and had a successful Bm in the ED. Pt felt well enough to stand and walk with myself, and was steady with her cane.  Presentation most likely from vasovagal reaction. Doubt arrhythmia or other acute cause of symptoms. Stable for d/c home.    Amount and/or Complexity of Data Reviewed  External Data Reviewed: notes.             Medications - No data to display    ED Risk Strat Scores                                               History of Present Illness       Chief Complaint   Patient presents with    Syncope     Pt from [facility] post lumbar puncture. Pt states feeling dizzy, hot and clammy. Pt had 40cc removed which they state is more than usually taken. Pt with elevated BP post producedure. Pt also c/o constipation. GCS 15 Aox4.        Past Medical History:   Diagnosis Date    Arthritis     Breast cancer (HCC) 03/01/2017    right breast intraductal carcinoma performed at [facility]    Cancer (HCC) 3/2017    rt. breast cancer    Chronic kidney disease     History of radiation therapy 08/01/2017    right breast    Hyperlipidemia     Lupus 2019    Diagnosis is lupus.    Urinary tract infection 2006      Past Surgical History:   Procedure Laterality Date    BREAST BIOPSY Left 1979    benign    BREAST EXCISIONAL BIOPSY Right 04/2017    intraductal carcinoma needle loc performed at [facility]    BREAST LUMPECTOMY Right 03/01/2017    BREAST SURGERY      CHOLECYSTECTOMY      COLONOSCOPY      ESOPHAGOGASTRODUODENOSCOPY      FL LUMBAR PUNCTURE THERAPEUTIC  10/4/2024    TX COLONOSCOPY FLX  DX W/COLLJ SPEC WHEN PFRMD N/A 10/23/2017    Procedure: COLONOSCOPY;  Surgeon: Riky Fortune MD;  Location: [facility];  Service: Colorectal      Family History   Problem Relation Age of Onset    Breast cancer Sister 56    Uterine cancer Sister     BRCA1 Negative Sister     BRCA2 Negative Sister     Breast cancer Maternal Grandmother 75    Cancer Maternal Grandmother         Left breast cancer    Breast cancer Cousin 50    Heart disease Mother     Hypertension Mother     Cancer Father         Bladder cancer    Heart disease Father     Hypertension Father     Cancer Sister         Left breast cancer      Social History     Tobacco Use    Smoking status: Never    Smokeless tobacco: Never   Vaping Use    Vaping status: Never Used   Substance Use Topics    Alcohol use: Not Currently     Comment: rarely    Drug use: Never      E-Cigarette/Vaping    E-Cigarette Use Never User       E-Cigarette/Vaping Substances      I have reviewed and agree with the history as documented.     69-year-old female with a history of NPH, is coming to the ED after feeling lightheaded and dizzy after having 40 mL drained via lumbar puncture this morning here at the hospital.  She states after the procedure she felt fine but then tried to have a bowel movement multiple times and could not, and then began to feel lightheaded and dizzy and clammy.  Since arriving to the ED, she states she now feels better and has no symptoms.  She did have a bowel movement successfully here.  She denies any chest pain, shortness of breath or palpitations.      Syncope  Associated symptoms: dizziness        Review of Systems   Cardiovascular:  Positive for syncope.   Neurological:  Positive for dizziness and light-headedness.   All other systems reviewed and are negative.          Objective       ED Triage Vitals   Temperature Pulse Blood Pressure Respirations SpO2 Patient Position - Orthostatic VS   10/04/24 1256 10/04/24 1301 10/04/24 1259 10/04/24 1415  10/04/24 1301 10/04/24 1259   97.5 °F (36.4 °C) 88 (!) 200/95 18 97 % Lying      Temp Source Heart Rate Source BP Location FiO2 (%) Pain Score    10/04/24 1256 10/04/24 1415 10/04/24 1259 -- --    Oral Monitor Right arm        Vitals      Date and Time Temp Pulse SpO2 Resp BP Pain Score FACES Pain Rating User   10/04/24 1422 -- 79 98 % -- 161/74 -- -- ED   10/04/24 1415 -- 82 98 % 18 161/74 -- -- AP   10/04/24 1301 -- 88 97 % -- -- -- -- CS   10/04/24 1259 97.5 °F (36.4 °C) -- -- -- 200/95 -- -- CS   10/04/24 1256 97.5 °F (36.4 °C) -- -- -- -- -- -- TP            Physical Exam  Vitals and nursing note reviewed.   Constitutional:       General: She is not in acute distress.     Appearance: Normal appearance. She is well-developed and normal weight. She is not ill-appearing, toxic-appearing or diaphoretic.   HENT:      Head: Normocephalic and atraumatic.      Right Ear: External ear normal.      Left Ear: External ear normal.      Nose: Nose normal.      Mouth/Throat:      Mouth: Mucous membranes are moist.      Pharynx: Oropharynx is clear.   Eyes:      General: No scleral icterus.     Conjunctiva/sclera: Conjunctivae normal.   Cardiovascular:      Rate and Rhythm: Normal rate and regular rhythm.      Pulses: Normal pulses.      Heart sounds: Normal heart sounds. No murmur heard.     No friction rub. No gallop.   Pulmonary:      Effort: Pulmonary effort is normal.      Breath sounds: Normal breath sounds.   Abdominal:      General: Abdomen is flat. Bowel sounds are normal. There is no distension or abdominal bruit. There are no signs of injury.      Palpations: Abdomen is soft. There is no shifting dullness or mass.      Tenderness: There is no abdominal tenderness.      Hernia: No hernia is present.   Genitourinary:     Adnexa: Right adnexa normal and left adnexa normal.   Musculoskeletal:         General: Normal range of motion.      Cervical back: Normal range of motion and neck supple.      Right lower leg: No  edema.      Left lower leg: No edema.   Skin:     General: Skin is warm and dry.      Capillary Refill: Capillary refill takes less than 2 seconds.   Neurological:      General: No focal deficit present.      Mental Status: She is alert and oriented to person, place, and time. Mental status is at baseline.      Cranial Nerves: No cranial nerve deficit.      Sensory: No sensory deficit.      Motor: No weakness.      Coordination: Coordination normal.      Gait: Gait normal.   Psychiatric:         Mood and Affect: Mood normal.         Behavior: Behavior normal.         Results Reviewed       None            No orders to display       Procedures    ED Medication and Procedure Management   Prior to Admission Medications   Prescriptions Last Dose Informant Patient Reported? Taking?   Calcium Ascorbate 500 MG TABS  Self Yes No   Sig: Take 500 mg by mouth 2 (two) times a day   Cholecalciferol (Vitamin D3) 125 MCG (5000 UT) CAPS  Self Yes No   Sig: Take 1 capsule by mouth daily   DULoxetine (CYMBALTA) 30 mg delayed release capsule  Self No No   Sig: Take 1 capsule (30 mg total) by mouth daily   Patient taking differently: Take 60 mg by mouth daily   METAMUCIL FIBER PO  Self Yes No   Sig: Take by mouth   Multiple Vitamin (MULTI-VITAMIN DAILY PO)  Self Yes No   Sig: Take 500 mg by mouth daily    azelastine (ASTELIN) 0.1 % nasal spray  Self Yes No   Si spray into each nostril daily Use in each nostril as directed   co-enzyme Q-10 30 MG capsule  Self Yes No   Sig: Take 30 mg by mouth daily   denosumab (PROLIA) 60 mg/mL  Self Yes No   Sig: Inject 60 mg under the skin   ezetimibe (ZETIA) 10 mg tablet  Self No No   Sig: TAKE 1 TABLET DAILY   fluticasone (FLONASE) 50 mcg/act nasal spray  Self Yes No   Si sprays into each nostril daily   hydroxychloroquine (PLAQUENIL) 200 mg tablet  Self Yes No   Sig: Take 200 mg by mouth daily   loperamide (IMODIUM) 2 mg/15 mL oral liquid  Self Yes No   timolol (TIMOPTIC) 0.5 % ophthalmic  solution  Self Yes No   Sig: Administer 1 drop to both eyes every 12 (twelve) hours      Facility-Administered Medications: None     Discharge Medication List as of 10/4/2024  2:54 PM        CONTINUE these medications which have NOT CHANGED    Details   azelastine (ASTELIN) 0.1 % nasal spray 1 spray into each nostril daily Use in each nostril as directed, Historical Med      Calcium Ascorbate 500 MG TABS Take 500 mg by mouth 2 (two) times a day, Historical Med      Cholecalciferol (Vitamin D3) 125 MCG (5000 UT) CAPS Take 1 capsule by mouth daily, Starting Thu 7/11/2024, Historical Med      co-enzyme Q-10 30 MG capsule Take 30 mg by mouth daily, Historical Med      denosumab (PROLIA) 60 mg/mL Inject 60 mg under the skin, Historical Med      DULoxetine (CYMBALTA) 30 mg delayed release capsule Take 1 capsule (30 mg total) by mouth daily, Starting Fri 1/5/2024, Normal      ezetimibe (ZETIA) 10 mg tablet TAKE 1 TABLET DAILY, Starting Fri 5/31/2024, Normal      fluticasone (FLONASE) 50 mcg/act nasal spray 2 sprays into each nostril daily, Historical Med      hydroxychloroquine (PLAQUENIL) 200 mg tablet Take 200 mg by mouth daily, Starting Mon 9/16/2019, Historical Med      loperamide (IMODIUM) 2 mg/15 mL oral liquid Starting Tue 3/30/2021, Historical Med      METAMUCIL FIBER PO Take by mouth, Historical Med      Multiple Vitamin (MULTI-VITAMIN DAILY PO) Take 500 mg by mouth daily , Historical Med      timolol (TIMOPTIC) 0.5 % ophthalmic solution Administer 1 drop to both eyes every 12 (twelve) hours, Starting Tue 5/12/2020, Historical Med           No discharge procedures on file.  ED SEPSIS DOCUMENTATION   Time reflects when diagnosis was documented in both MDM as applicable and the Disposition within this note       Time User Action Codes Description Comment    10/4/2024  2:53 PM Sigifredo Chapman Add [R55] Vasovagal near syncope                  Sigifredo Chapman DO  10/04/24 2022

## 2024-10-04 NOTE — NURSING NOTE
"Following patient's therapeutic lumbar puncture, patient reported feeling constipated. Attempted to use bed pan multiple times. Wanted to try going into the bathroom. Patient assisted to the bathroom and when finished reported felt lightheaded/faint and clammy. Patient assisted to lie down and given juice and a snack. Per patient's , patient has not eaten anything since 5 pm yesterday (10/3). VS obtained and BP elevated at 190/80. /77 prior to the procedure this morning. Confirmed patient does not take BP medications and according to patient's chart BPs normally 130s/80s. Patient reported feeling better after having something to eat and drink. Denied dizziness, lightheadedness or headache. BP rechecked and  was 169/63. Neuro Radiologist MD Mast who performed the LP made aware and based on prior evaluation of patient okay with her going to PT from his standpoint. Assisted patient to change for PT. Patient stated \"I am starting to feel hot again\" and reported not feeling well. Message sent MD Mast who came to assess the patient and recommended she go to the ED for an evaluation. Per ED Charge RN, April, patient can be brought to ED bed 8. Jamie Lorenzo, Neurosurgery RN notified and left message with PT making them aware patient would not be coming to her appointment today. Patient transported to ED bed 8 by stretcher with her  and report given to Naomi ED RN. Patient's  will contact neurosurgery Monday to discuss rescheduling the procedure.   "

## 2024-10-04 NOTE — NURSING NOTE
Discharge instructions reviewed with patient and patient's  Lux. Patient and Lux verbalize understanding. No questions at this time.

## 2024-10-08 ENCOUNTER — TELEPHONE (OUTPATIENT)
Age: 69
End: 2024-10-08

## 2024-10-09 ENCOUNTER — TELEMEDICINE (OUTPATIENT)
Dept: NEUROSURGERY | Facility: CLINIC | Age: 69
End: 2024-10-09
Payer: MEDICARE

## 2024-10-09 DIAGNOSIS — G31.9 VENTRICULAR ENLARGEMENT DUE TO BRAIN ATROPHY (HCC): Primary | ICD-10-CM

## 2024-10-09 PROCEDURE — 99212 OFFICE O/P EST SF 10 MIN: CPT | Performed by: PHYSICIAN ASSISTANT

## 2024-10-09 NOTE — PROGRESS NOTES
Virtual Regular Visit  Name: Radha Covington      : 1955      MRN: [id number]  Encounter Provider: Los Schmitt PA-C  Encounter Date: 10/9/2024   Encounter department: [facility] NEUROSURGICAL [facility] [place]    Verification of patient location:    Patient is located at Home in the following state in which I hold an active license [place]    It was my intent to perform this visit via video technology but the patient was not able to do a video connection so the visit was completed via audio telephone only.     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  Here today for follow-up following high-volume therapeutic lumbar puncture.Patient reported she developed Nausea and vomiting following LP and she went to ED immediately after the LP and she didn't do post LP PT assessment. There is no  subjective improvement immediately or thereafter after LP.  She says she was doing PT in the past and noticed some improvement with her gait, and she doesn't want to do another LP at least soon and she says she would call office if she needs.           Imagings:               No images required during this visit         Plan:  Discussed with the patient that there is no objective assessment post LP and workup remains incomplete.  Patient reports there is no subjective improvement after LP.  Patient was offered if she wants referral  for LP. She is not interested in repeat LP  at this time .  Patient reports she felt somewhat better after physical therapy so she wanted to proceed with physical therapy for now  Advised to call office with question or concern.  History, and management plan discussed with the patient .  Patient with incomplete workup of suspected NPH.  Offered  "referral to repeat LP but patient declined at this time stating that she will try physical therapy .advised to call office with any question or concern . all questions and concerns were answered.  Patient verbalized understanding's and agreed with the plan.    Encounter provider Los Schmitt PA-C    The patient was identified by name and date of birth. Radha Covingtno was informed that this is a telemedicine visit and that the visit is being conducted through the [employer] platform. She agrees to proceed..  My office door was closed. No one else was in the room.  She acknowledged consent and understanding of privacy and security of the video platform. The patient has agreed to participate and understands they can discontinue the visit at any time.    Patient is aware this is a billable service.     C/C: \" Here today for follow up of Ventricular enlargement\"    History of Present Illness   See detailed discussion above    History obtained from : patient  Review of Systems   HENT:  Negative for tinnitus.    Eyes:  Negative for visual disturbance.   Genitourinary:         Patient experiences both bowel and bladder incontinence        Musculoskeletal:  Positive for gait problem.   Neurological:  Negative for speech difficulty and headaches.   Psychiatric/Behavioral:  Negative for confusion and decreased concentration.         Cognitive difficulty sometimes, trouble understanding things, does not feel confused            Objective     Physical Exam    Visit Time  Total Visit Duration: 20 minutes spent discussing history, and management plan        "

## 2024-10-09 NOTE — PROGRESS NOTES
Ambulatory Visit  Name: Radha Covington      : 1955      MRN: [id number]  Encounter Provider: Los Schmitt PA-C  Encounter Date: 10/9/2024   Encounter department: [facility] NEUROSURGICAL [facility]    Assessment & Plan      History of Present Illness {?Quick Links Encounters * My Last Note * Last Note in Specialty * Snapshot * Since Last Visit * History :34836}  HPI  Review of Systems   HENT:  Negative for tinnitus.    Eyes:  Negative for visual disturbance.   Genitourinary:         Patient experiences both bowel and bladder incontinence   Musculoskeletal:  Positive for gait problem.   Neurological:  Negative for speech difficulty and headaches.   Psychiatric/Behavioral:  Negative for confusion and decreased concentration.         Cognitive difficulty sometimes, trouble understanding things, does not feel confused     I have personally reviewed the MA's review of systems and made changes as necessary.    {Select to Display PM (Optional):22436}  Objective   {?Quick Links Trend Vitals * Enter New Vitals * Results Review * Timeline (Adult) * Labs * Imaging * Cardiology * Procedures * Lung Cancer Screening * Surgical eConsent :64186}  LMP  (LMP Unknown)     Physical Exam  Neurologic Exam    {SL AMB Radiology Results Review (Optional):48946}    {Administrative / Billing Section (Optional):85197}

## 2024-11-01 ENCOUNTER — OFFICE VISIT (OUTPATIENT)
Dept: NEPHROLOGY | Facility: CLINIC | Age: 69
End: 2024-11-01
Payer: MEDICARE

## 2024-11-01 VITALS
OXYGEN SATURATION: 95 % | HEIGHT: 61 IN | HEART RATE: 91 BPM | SYSTOLIC BLOOD PRESSURE: 132 MMHG | DIASTOLIC BLOOD PRESSURE: 84 MMHG | TEMPERATURE: 97.2 F | WEIGHT: 181 LBS | BODY MASS INDEX: 34.17 KG/M2

## 2024-11-01 DIAGNOSIS — R32 URINARY INCONTINENCE, UNSPECIFIED TYPE: ICD-10-CM

## 2024-11-01 DIAGNOSIS — M32.9 SYSTEMIC LUPUS ERYTHEMATOSUS, UNSPECIFIED SLE TYPE, UNSPECIFIED ORGAN INVOLVEMENT STATUS (HCC): ICD-10-CM

## 2024-11-01 DIAGNOSIS — N18.30 STAGE 3 CHRONIC KIDNEY DISEASE, UNSPECIFIED WHETHER STAGE 3A OR 3B CKD (HCC): Primary | ICD-10-CM

## 2024-11-01 DIAGNOSIS — N25.81 SECONDARY HYPERPARATHYROIDISM (HCC): ICD-10-CM

## 2024-11-01 PROCEDURE — 99214 OFFICE O/P EST MOD 30 MIN: CPT | Performed by: INTERNAL MEDICINE

## 2024-11-01 PROCEDURE — G2211 COMPLEX E/M VISIT ADD ON: HCPCS | Performed by: INTERNAL MEDICINE

## 2024-11-01 NOTE — PATIENT INSTRUCTIONS
Recommend checking blood/urine testing in the next month.   Discussed urology referral for incontinence. To help with nightime urination, stop fluid intake 2 hours before bed and avoid caffeinated beverages.

## 2024-11-01 NOTE — PROGRESS NOTES
Assessment & Plan:    1. Stage 3 chronic kidney disease, unspecified whether stage 3a or 3b CKD (HCC)  -     Urinalysis with microscopic; Future  -     Albumin / creatinine urine ratio; Future  -     Protein / creatinine ratio, urine; Future  -     C3 complement; Future  -     C4 complement; Future  -     Anti-dsDNA (Double-stranded) Ab by July method (RDL); Future  -     Comprehensive metabolic panel; Future  -     Urinalysis with microscopic; Future; Expected date: 05/06/2025  -     PTH, intact; Future; Expected date: 05/06/2025  -     Albumin / creatinine urine ratio; Future; Expected date: 05/06/2025  -     Comprehensive metabolic panel; Future; Expected date: 05/06/2025  -     CBC and differential; Future; Expected date: 05/06/2025  2. Systemic lupus erythematosus, unspecified SLE type, unspecified organ involvement status (HCC)  -     Urinalysis with microscopic; Future  -     Albumin / creatinine urine ratio; Future  -     Protein / creatinine ratio, urine; Future  -     C3 complement; Future  -     C4 complement; Future  -     Anti-dsDNA (Double-stranded) Ab by July method (RDL); Future  -     Comprehensive metabolic panel; Future  -     Urinalysis with microscopic; Future; Expected date: 05/06/2025  -     PTH, intact; Future; Expected date: 05/06/2025  -     Albumin / creatinine urine ratio; Future; Expected date: 05/06/2025  -     Comprehensive metabolic panel; Future; Expected date: 05/06/2025  -     CBC and differential; Future; Expected date: 05/06/2025  3. Secondary hyperparathyroidism (HCC)  4. Urinary incontinence, unspecified type         1.CKD 2/3a  Volume status appears compensated. BP under good control, goal<130/80.  3/18/24 Cr 0.93 with eGFR 62 ml/min. Baseline 0.9-1.1 mg/L.   Etiology 2/2 age related eGFR loss and potential tubulointerstitial disease. By hx, no evidence of lupus nephritis.  No recent labs, advised to repeat labs ASAP and prior to visit. Fu lupus parameters. Evaluate  proteinuria.  No indication for RAASi at this time.  FU in 6 months with labs prior. Patient will require ongoing complex care for CKD management.    2. 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   Continue rheumatology fu, currently maintained on Plaquenil.    3.Secondary HPT  Trend PTH, goal 35-70. Last PTH 12/3/23. Repeat PTH, not completed 11/1/24.     4. Urinary incontinence   Offered urology evaluation, patient declined.  Stop fluid intake 2 hours prior to bed time and avoid caffeinated beverages.     The benefits, risks and alternatives to the treatment plan were discussed at this visit. Patient was advised of common adverse effects of any medical therapies prescribed. All questions were answered and discussed with the patient and any accompanying family members or caretakers.      Subjective:      Patient ID: Radha Covington is a 69 y.o. female presents for follow up in the [place] office for CKD management. Patient last seen 3/22/24 for CKD3a. Patient accompanied by  during the visit.  Patient has past medical hx lupus without nephritis under management of rheumatology with plaquenil.   Additional hx hyperparathyroidism, sensory neuropathy, breast cancer, gait instability, brain ventricular enlargement.    HPI    In the interim since last visit, she had spinal tap 1 month ago. She reports frequent urination at night, ongoing issue.     Follows with rheumatology and maintained on Plaquenil, follows with optometry twice a year, appt in December 6th.     Denies skin rashes/ changes. Reports arthirits stable. No joint swelling.    The following portions of the patient's history were reviewed and updated as appropriate: allergies, current medications, past family history, past medical history, past social history, past surgical history, and problem list.    Review of Systems   Respiratory: Negative.     Cardiovascular: Negative.    Gastrointestinal: Negative.   "  Genitourinary:  Positive for frequency.   Neurological: Negative.    All other systems reviewed and are negative.        Objective:      /84 (BP Location: Left arm, Patient Position: Sitting, Cuff Size: Standard)   Pulse 91   Temp (!) 97.2 °F (36.2 °C) (Temporal)   Ht 5' 1\" (1.549 m)   Wt 82.1 kg (181 lb)   LMP  (LMP Unknown)   SpO2 95%   BMI 34.20 kg/m²          Physical Exam  Vitals reviewed.   Constitutional:       General: She is not in acute distress.     Appearance: She is not ill-appearing.   HENT:      Head: Normocephalic and atraumatic.      Nose: Nose normal.      Mouth/Throat:      Mouth: Mucous membranes are moist.      Pharynx: Oropharynx is clear.   Cardiovascular:      Rate and Rhythm: Normal rate and regular rhythm.      Heart sounds:      No friction rub.   Pulmonary:      Breath sounds: Normal breath sounds. No wheezing or rales.   Abdominal:      Palpations: Abdomen is soft.      Tenderness: There is no abdominal tenderness. There is no guarding.   Musculoskeletal:      Right lower leg: Edema present.      Left lower leg: Edema present.   Skin:     General: Skin is dry.      Coloration: Skin is not jaundiced.      Findings: No bruising or rash.   Neurological:      Mental Status: She is alert. Mental status is at baseline.      Comments: In wheelchair   Psychiatric:         Mood and Affect: Mood normal.         Behavior: Behavior normal.             Lab Results   Component Value Date     10/03/2015    SODIUM 139 11/03/2024    K 3.7 11/03/2024     11/03/2024    CO2 26 11/03/2024    ANIONGAP 10 10/03/2015    AGAP 9 11/03/2024    BUN 15 11/03/2024    CREATININE 1.06 11/03/2024    GLUC 154 (H) 05/19/2019    GLUF 145 (H) 11/03/2024    CALCIUM 8.8 11/03/2024    AST 14 11/03/2024    ALT 11 11/03/2024    ALKPHOS 39 11/03/2024    PROT 7.5 10/03/2015    TP 7.1 11/03/2024    BILITOT 1.14 (H) 10/03/2015    TBILI 0.79 11/03/2024    EGFR 53 11/03/2024      Lab Results   Component " "Value Date    CREATININE 1.06 11/03/2024    CREATININE 0.93 03/18/2024    CREATININE 1.04 12/03/2023    CREATININE 0.94 05/21/2023    CREATININE 1.10 12/22/2022    CREATININE 1.07 07/01/2022    CREATININE 1.13 02/03/2022    CREATININE 1.13 12/14/2021    CREATININE 1.11 09/02/2021    CREATININE 1.08 03/01/2021    CREATININE 1.18 01/22/2021    CREATININE 0.89 12/01/2020    CREATININE 0.98 10/06/2020    CREATININE 1.05 07/12/2020    CREATININE 1.06 05/21/2020      Lab Results   Component Value Date    COLORU Yellow 11/03/2024    CLARITYU Clear 11/03/2024    SPECGRAV >=1.030 11/03/2024    PHUR 5.5 11/03/2024    LEUKOCYTESUR Negative 11/03/2024    NITRITE Negative 11/03/2024    PROTEIN UA Trace (A) 11/03/2024    GLUCOSEU Negative 11/03/2024    KETONESU Negative 11/03/2024    UROBILINOGEN <2.0 11/03/2024    BILIRUBINUR Negative 11/03/2024    BLOODU Small (A) 11/03/2024    RBCUA 2-4 11/03/2024    WBCUA 1-2 (A) 11/03/2024    EPIS Occasional 11/03/2024    BACTERIA Occasional 11/03/2024      No results found for: \"LABPROT\"  No results found for: \"MICROALBUR\", \"KFDX08TPD\"  Lab Results   Component Value Date    WBC 5.79 03/18/2024    HGB 14.2 03/18/2024    HCT 45.4 03/18/2024    MCV 93 03/18/2024     10/04/2024      Lab Results   Component Value Date    HGB 14.2 03/18/2024    HGB 14.0 12/03/2023    HGB 13.8 12/22/2022    HGB 13.1 07/01/2022    HGB 13.6 12/14/2021      No results found for: \"IRON\", \"TIBC\", \"FERRITIN\"   No results found for: \"PTHCALCIUM\", \"HTEP70JOCAUS\", \"PHOSPHORUS\"   Lab Results   Component Value Date    CHOLESTEROL 224 (H) 03/18/2024    HDL 83 03/18/2024    LDLCALC 116 (H) 03/18/2024    TRIG 124 03/18/2024      Lab Results   Component Value Date    URICACID 4.9 12/03/2023      Lab Results   Component Value Date    HGBA1C 5.8 (H) 01/22/2021      Lab Results   Component Value Date    FREET4 1.11 02/03/2022      Lab Results   Component Value Date    LUCINA Positive (A) 05/21/2020      Lab Results   Component " "Value Date    PROT 7.5 10/03/2015        Portions of the record may have been created with voice recognition software. Occasional wrong word or \"sound a like\" substitutions may have occurred due to the inherent limitations of voice recognition software. Read the chart carefully and recognize, using context, where substitutions have occurred. If you have any questions, please contact the dictating provider.      "

## 2024-11-03 ENCOUNTER — APPOINTMENT (OUTPATIENT)
Dept: LAB | Facility: HOSPITAL | Age: 69
End: 2024-11-03
Payer: MEDICARE

## 2024-11-03 DIAGNOSIS — M32.9 SYSTEMIC LUPUS ERYTHEMATOSUS, UNSPECIFIED SLE TYPE, UNSPECIFIED ORGAN INVOLVEMENT STATUS (HCC): ICD-10-CM

## 2024-11-03 DIAGNOSIS — N18.30 STAGE 3 CHRONIC KIDNEY DISEASE, UNSPECIFIED WHETHER STAGE 3A OR 3B CKD (HCC): ICD-10-CM

## 2024-11-03 LAB
ALBUMIN SERPL BCG-MCNC: 4.1 G/DL (ref 3.5–5)
ALP SERPL-CCNC: 39 U/L (ref 34–104)
ALT SERPL W P-5'-P-CCNC: 11 U/L (ref 7–52)
ANION GAP SERPL CALCULATED.3IONS-SCNC: 9 MMOL/L (ref 4–13)
AST SERPL W P-5'-P-CCNC: 14 U/L (ref 13–39)
BACTERIA UR QL AUTO: ABNORMAL /HPF
BILIRUB SERPL-MCNC: 0.79 MG/DL (ref 0.2–1)
BILIRUB UR QL STRIP: NEGATIVE
BUN SERPL-MCNC: 15 MG/DL (ref 5–25)
C3 SERPL-MCNC: 171 MG/DL (ref 87–200)
C4 SERPL-MCNC: 53 MG/DL (ref 19–52)
CALCIUM SERPL-MCNC: 8.8 MG/DL (ref 8.4–10.2)
CHLORIDE SERPL-SCNC: 104 MMOL/L (ref 96–108)
CLARITY UR: CLEAR
CO2 SERPL-SCNC: 26 MMOL/L (ref 21–32)
COLOR UR: YELLOW
CREAT SERPL-MCNC: 1.06 MG/DL (ref 0.6–1.3)
CREAT UR-MCNC: 158.5 MG/DL
CREAT UR-MCNC: 160.4 MG/DL
GFR SERPL CREATININE-BSD FRML MDRD: 53 ML/MIN/1.73SQ M
GLUCOSE P FAST SERPL-MCNC: 145 MG/DL (ref 65–99)
GLUCOSE UR STRIP-MCNC: NEGATIVE MG/DL
HGB UR QL STRIP.AUTO: ABNORMAL
KETONES UR STRIP-MCNC: NEGATIVE MG/DL
LEUKOCYTE ESTERASE UR QL STRIP: NEGATIVE
MICROALBUMIN UR-MCNC: 17 MG/L
MICROALBUMIN/CREAT 24H UR: 11 MG/G CREATININE (ref 0–30)
MUCOUS THREADS UR QL AUTO: ABNORMAL
NITRITE UR QL STRIP: NEGATIVE
NON-SQ EPI CELLS URNS QL MICRO: ABNORMAL /HPF
PH UR STRIP.AUTO: 5.5 [PH]
POTASSIUM SERPL-SCNC: 3.7 MMOL/L (ref 3.5–5.3)
PROT SERPL-MCNC: 7.1 G/DL (ref 6.4–8.4)
PROT UR STRIP-MCNC: ABNORMAL MG/DL
PROT UR-MCNC: 22.5 MG/DL
PROT/CREAT UR: 0.1 MG/G{CREAT} (ref 0–0.1)
RBC #/AREA URNS AUTO: ABNORMAL /HPF
SODIUM SERPL-SCNC: 139 MMOL/L (ref 135–147)
SP GR UR STRIP.AUTO: >=1.03 (ref 1–1.03)
UROBILINOGEN UR STRIP-ACNC: <2 MG/DL
WBC #/AREA URNS AUTO: ABNORMAL /HPF

## 2024-11-03 PROCEDURE — 82570 ASSAY OF URINE CREATININE: CPT

## 2024-11-03 PROCEDURE — 86160 COMPLEMENT ANTIGEN: CPT

## 2024-11-03 PROCEDURE — 81001 URINALYSIS AUTO W/SCOPE: CPT

## 2024-11-03 PROCEDURE — 82043 UR ALBUMIN QUANTITATIVE: CPT

## 2024-11-03 PROCEDURE — 86225 DNA ANTIBODY NATIVE: CPT

## 2024-11-03 PROCEDURE — 84156 ASSAY OF PROTEIN URINE: CPT

## 2024-11-03 PROCEDURE — 80053 COMPREHEN METABOLIC PANEL: CPT

## 2024-11-03 PROCEDURE — 36415 COLL VENOUS BLD VENIPUNCTURE: CPT

## 2024-11-05 LAB — DSDNA AB SER-ACNC: 1 IU/ML (ref 0–9)

## 2024-11-12 ENCOUNTER — OFFICE VISIT (OUTPATIENT)
Dept: NEUROLOGY | Facility: CLINIC | Age: 69
End: 2024-11-12
Payer: MEDICARE

## 2024-11-12 VITALS
SYSTOLIC BLOOD PRESSURE: 128 MMHG | TEMPERATURE: 98 F | HEIGHT: 61 IN | DIASTOLIC BLOOD PRESSURE: 80 MMHG | HEART RATE: 95 BPM | OXYGEN SATURATION: 98 % | WEIGHT: 181 LBS | BODY MASS INDEX: 34.17 KG/M2

## 2024-11-12 DIAGNOSIS — R26.9 GAIT DISTURBANCE: ICD-10-CM

## 2024-11-12 DIAGNOSIS — G91.2 NPH (NORMAL PRESSURE HYDROCEPHALUS) (HCC): Primary | ICD-10-CM

## 2024-11-12 PROCEDURE — 99214 OFFICE O/P EST MOD 30 MIN: CPT | Performed by: PSYCHIATRY & NEUROLOGY

## 2024-11-12 PROCEDURE — G2211 COMPLEX E/M VISIT ADD ON: HCPCS | Performed by: PSYCHIATRY & NEUROLOGY

## 2024-11-12 NOTE — PROGRESS NOTES
Ambulatory Visit  Name: Radha Covington      : 1955      MRN: [id number]  Encounter Provider: Kyle Jim MD  Encounter Date: 2024   Encounter department: NEUROLOGY [facility]    Assessment & Plan  NPH (normal pressure hydrocephalus) (HCC)  This patient has had gradual decline in her ambulation for the last few years, with shuffling gait, along with that has some urinary urgency as well as mild change in cognition over the years.  Workup thus far has revealed ventriculomegaly on brain imaging, does have mild neuropathy however I do not think that is significant to explain her ambulatory dysfunction.  Does not have any evidence to support a generalized progressive neurogenic disease, and no myopathic features on exam.  Further imaging has been unremarkable, no significant cord compression that would explain her progressive decline.  Thus far, workup is most consistent with normal pressure hydrocephalus, which I explained to both patient and her  at length today.  Patient did have a lumbar puncture, subjectively did not, not much improvement, however objective evaluation could not be done as patient was not feeling well post lumbar puncture.  Willing to proceed with repeat LP, will reach out to neurosurgery to schedule that.  In the meantime, encouraged her to continue physical therapy on a regular basis, and also do exercises on her own as a routine to avoid any deconditioning.    Patient will return for a follow-up with us in 3 to 4 months, and has my contact information for questions or concerns.       Gait disturbance             History of Present Illness   HPI  I had the pleasure of seeing your patient in neurology clinic for neuromuscular follow-up.  As you know, patient is a 69-year-old woman, who was referred to us for evaluation for ambulatory dysfunction.  Patient was last evaluated by me in 2024, now returns for follow-up.    Since last being seen, patient has  had some additional workup, including MRI of the brain, cervical and lumbar spine.  As well as EMG.  Imaging was consistent with normal pressure hydrocephalus, she was referred to neurosurgery, had a lumbar puncture on October 4, unfortunately post LP evaluation could not be performed, as patient needed to go to emergency room for dizziness, feeling lightheaded, and abdominal pain, due to severe constipation.  Subjectively, patient reports she did not have much improvement post lumbar puncture.  She was evaluated by neurosurgery for a follow-up visit, repeat LP was offered which she deferred.    She has been doing physical therapy twice a week, feels may have made some improvement, continues to ambulate very slowly with the help of a cane inside the house.  She does use a walker which she does not use on a regular basis.  She does have bladder urgency, memory has been affected as well.    MRI brain June 2024, images personally reviewed by me as a part of this evaluation, patient has diffuse ventriculomegaly with some transependymal edema, with small vessel disease.  No significant cortical atrophy.    MRI cervical spine June 2024 diffuse cervical spondylosis, no significant central canal stenosis or cord compression, bilateral neuroforaminal narrowing noted at C5-6 and C6-7.    MRI lumbar spine without contrast; mild to degenerative changes, noncompressive, most most significant at L4-5 and L5-S1.  No significant canal stenosis.    EMG mild axonal sensorimotor neuropathy with chronic L5-S1 radiculopathy in the right lower extremity.    Labs: June 2024: Sed rate 43, has been mildly elevated since  2020, B12: 274, MMA normal, Spep neg, CK normal.        Sed Rate   Latest Ref Rng 0 - 29 mm/hour   10/22/2019 24 (H)    5/21/2020 20    10/6/2020 31 (H)    3/1/2021 36 (H)    12/14/2021 33 (H)    8/23/2022 38 (H)    6/16/2024 43 (H)          PRIOR hx from initial evaluation in May 2024  and Workup:  Patient reports the  symptoms have been going on for at least a few years, she has had number of falls over the years.  She feels her balance is not good, she tends to stumble here and there, and on occasions food feel her legs are giving out on her.  Most recent fall was in January 2024, she fractured her left wrist.  Weakness is in both  lower extremities and more recently, notes similar weakness in the upper extremities.  Patient does have chronic neck pain, back pain for more than 20 years, she uses a cane for assistance which does help her.  She denies any change in her vision, specifically no drooping of the eyes, double vision, blurry vision.  Speech and swallowing have been unaffected.  She denies any shortness of breath, at rest, with ADLs, or with exertion.  She does report loss of bladder and bowel control over the last few years, reports urinary urgency and frequency, and for bowels she usually feels a cramp in the lower abdomen, with sudden urgency to defecate.  She does report some numbness in her feet, denies any dysesthesias.     Does have osteoporosis, on Prolia, follows with rheumatology at [facility] for lupus/mixed connective tissue disease, currently on Plaquenil.     Besides this, has prior history of breast cancer, status postlumpectomy in 2017 and radiation, did not need any chemotherapy.  Has microscopic colitis, follows with GI, currently on budesonide and Imodium.     Labs:  March 2024 vitamin D normal 41.4.,CMP, CBC normal.  May 2023; TSH normal, C4 complement levels minimally high, C3 normal, parathormone 130, vitamin D18.6.     CT head January 2024 decreased attenuation noted in periventricular and subcortical white matter, consistent with small vessel disease, does have ventriculomegaly, slightly out of proportion to cortical atrophy.    Review of Systems   Constitutional:  Negative for appetite change, fatigue and fever.   HENT: Negative.  Negative for hearing loss, tinnitus, trouble swallowing and  "voice change.    Eyes: Negative.  Negative for photophobia, pain and visual disturbance.   Respiratory: Negative.  Negative for shortness of breath.    Cardiovascular: Negative.  Negative for palpitations.   Gastrointestinal: Negative.  Negative for nausea and vomiting.   Endocrine: Negative.  Negative for cold intolerance.   Genitourinary: Negative.  Negative for dysuria, frequency and urgency.   Musculoskeletal:  Positive for gait problem (has trouble walking/ getting out of bed). Negative for back pain, myalgias, neck pain and neck stiffness.   Skin: Negative.  Negative for rash.   Allergic/Immunologic: Negative.    Neurological:  Negative for dizziness, tremors, seizures, syncope, facial asymmetry, speech difficulty, weakness, light-headedness, numbness and headaches.   Hematological: Negative.  Does not bruise/bleed easily.   Psychiatric/Behavioral: Negative.  Negative for confusion, hallucinations and sleep disturbance.    All other systems reviewed and are negative.    I have personally reviewed the MA's review of systems and made changes as necessary.      Objective     /80 (BP Location: Left arm, Patient Position: Sitting, Cuff Size: Adult)   Pulse 95   Temp 98 °F (36.7 °C) (Temporal)   Ht 5' 1\" (1.549 m)   Wt 82.1 kg (181 lb)   LMP  (LMP Unknown)   SpO2 98%   BMI 34.20 kg/m²     Physical Exam   On general examination, patient was not in any acute distress.  HEENT was unremarkable.  Does have bilateral mild lower extremity edema.    Neurologic Exam  Neurologically, patient was awake and alert, speech was fluent without any dysarthria or aphasia.  Cranial examination did not reveal any ptosis, normal extraocular movements, normal strength of eye closure muscles, no facial asymmetry or weakness.  Tongue was midline without atrophy or fasciculations.  On motor examination, strength is full in neck flexors, extensors, and all muscle groups in both upper and lower extremities, proximally and " distally.  Sensation was normal in the upper extremities, minimal sensory loss was noted up to ankles to pinprick and temperature, proprioception slightly impaired at the toes, reflexes are 2 in the upper extremities as well as the right knee, left knee was 1, ankles were 2 bilaterally.  Patient needed help to get up from a sitting position, ambulated with a slight shuffle with the help of a cane.     Results/Data:  I have reviewed the results and images from the MRIs and EMG in detail with the patient.

## 2024-11-12 NOTE — ASSESSMENT & PLAN NOTE
This patient has had gradual decline in her ambulation for the last few years, with shuffling gait, along with that has some urinary urgency as well as mild change in cognition over the years.  Workup thus far has revealed ventriculomegaly on brain imaging, does have mild neuropathy however I do not think that is significant to explain her ambulatory dysfunction.  Does not have any evidence to support a generalized progressive neurogenic disease, and no myopathic features on exam.  Further imaging has been unremarkable, no significant cord compression that would explain her progressive decline.  Thus far, workup is most consistent with normal pressure hydrocephalus, which I explained to both patient and her  at length today.  Patient did have a lumbar puncture, subjectively did not, not much improvement, however objective evaluation could not be done as patient was not feeling well post lumbar puncture.  Willing to proceed with repeat LP, will reach out to neurosurgery to schedule that.  In the meantime, encouraged her to continue physical therapy on a regular basis, and also do exercises on her own as a routine to avoid any deconditioning.    Patient will return for a follow-up with us in 3 to 4 months, and has my contact information for questions or concerns.

## 2024-11-15 ENCOUNTER — TELEPHONE (OUTPATIENT)
Age: 69
End: 2024-11-15

## 2024-11-15 DIAGNOSIS — G91.2 NORMAL PRESSURE HYDROCEPHALUS (HCC): Primary | ICD-10-CM

## 2024-11-15 NOTE — TELEPHONE ENCOUNTER
PT CALLED REQUESTING TO SPEAK WITH NN NPH TO DISCUSS SCHEDULING A LUMBAR PUNCTURE.    PLEASE CB PT TO DISCUSS NPH TREATEMENT PLAN.      THANK YOU

## 2024-11-19 NOTE — TELEPHONE ENCOUNTER
PT CALLED FOR AN UPDATE ON SCHEDULING HER LUMBAR PUNCTURE.    OFFERED TO REACH OUT TO [facility] FOR FURTHER ASSISTANCE.     PT DISCONNECTED CALL BEFORE TRANSFER COULD BE COMPLETED.    PLEASE CB PT WITH UPDATE ON LUMBAR PUNCTURE.      THANK YOU

## 2024-11-19 NOTE — TELEPHONE ENCOUNTER
I am awaiting approval from the IR team they were to message me and then I will call her back.  Heading out for the day in 15 min so it may be tomorrow,  Just JUDY   no

## 2024-11-19 NOTE — TELEPHONE ENCOUNTER
This RN spoke with patient regarding her request to reschedule LP.  She is available through December and January with the exception of Friday, December 6 th rescheduling her LP procedure.     This RN phoned central scheduling for assistance.  There is a need to verify the provider and bed availability, central scheduling to reach out once response is received.

## 2024-11-20 NOTE — TELEPHONE ENCOUNTER
This AM I received confirmation of the 12/20/2024 availability dates and scheduling from central scheduling for this patient.      The follow up appointments were created and patient phoned with update of date, time and location of appointments for 12/20/2024.  Patient informed this info will be in her portal and she is encouraged cot call the office with any additional or concerns.  Patient appreciative for assistance.

## 2024-11-27 DIAGNOSIS — E78.5 HYPERLIPIDEMIA, UNSPECIFIED HYPERLIPIDEMIA TYPE: ICD-10-CM

## 2024-11-27 RX ORDER — EZETIMIBE 10 MG/1
10 TABLET ORAL DAILY
Qty: 90 TABLET | Refills: 1 | Status: SHIPPED | OUTPATIENT
Start: 2024-11-27

## 2024-12-17 ENCOUNTER — TELEPHONE (OUTPATIENT)
Age: 69
End: 2024-12-17

## 2024-12-17 DIAGNOSIS — R29.6 MULTIPLE FALLS: ICD-10-CM

## 2024-12-17 DIAGNOSIS — R26.9 GAIT DISTURBANCE: Primary | ICD-10-CM

## 2024-12-17 DIAGNOSIS — R26.89 BALANCE PROBLEM: ICD-10-CM

## 2024-12-17 NOTE — TELEPHONE ENCOUNTER
Pt called, asked if pcp could please place another referral for in home PT (for her balance) with [facility]. Said they will need a new script for January 1st. Fax #[phone]. Please advise.

## 2024-12-18 DIAGNOSIS — F32.A DEPRESSION, UNSPECIFIED DEPRESSION TYPE: ICD-10-CM

## 2024-12-19 RX ORDER — DULOXETIN HYDROCHLORIDE 30 MG/1
30 CAPSULE, DELAYED RELEASE ORAL DAILY
Qty: 90 CAPSULE | Refills: 3 | Status: SHIPPED | OUTPATIENT
Start: 2024-12-19

## 2024-12-19 NOTE — ASSESSMENT & PLAN NOTE
Patient presents for ongoing follow up regarding possible NPH  Concern for ventriculomegaly on imaging alongside clinical concerns for cognitive impairment, urinary incontinence and shuffling gait  Was seen by Dr. Gee 2024, recommended high volume LP and post LP assessment however post LP assessment was never completed; subjectively patient did not report any improvement in her symptoms  Seen by neurology who recommended repeat LP and post LP assessment which patient is now agreeable to    Pre LP assessment ():  Gait quality:  + Wide base of support, outwardly rotated feet, diminished step height  10 meter walk test:  could only do 3m due to space, 0.3m/s  TU sec  FGA:    MOCA :      Post LP assessment ():  LP:  30cc drained, opening pressure 16, closing pressure 5  Gait quality:  + Wide base of support, outwardly rotated feet, diminished step height  10 meter walk test:  3.5m/s  TU sec  FGA:  not completed due to lack of time  MOCA:     Imaging:  MRI brain wo contrast 6/3/2024: Mild diffuse ventriculomegaly. No obstructive hydrocephalus.     Plan:  Reviewed therapy assessment with patient and .    Her gait quality did not change from pre LP testing  10 meter test is not a direct comparison however she does seem to have improved post LP  Her TUG assessment worsened - now taking 47 seconds as opposed to 35 seconds to complete the assessment  FGA could not be completed unfortunately due to time constraints and therefore comparison cannot be made  MOCA with only 2 point improvement  Discussed that I am concerned that shunt diversion may not be a viable option for patient.  Subjectively both patient and  do not feel she has made any improvement post LP.  We discussed options moving forward including monitoring patient's exam over the weekend to see if any subjective improvements are noted with plan to follow up on Monday.  They are agreeable to this.  Dr. Gee to  discuss further with patient and , see attestation for final recommendations and follow-up plan

## 2024-12-20 ENCOUNTER — OFFICE VISIT (OUTPATIENT)
Dept: NEUROSURGERY | Facility: CLINIC | Age: 69
End: 2024-12-20
Payer: MEDICARE

## 2024-12-20 ENCOUNTER — OFFICE VISIT (OUTPATIENT)
Dept: PHYSICAL THERAPY | Facility: CLINIC | Age: 69
End: 2024-12-20
Payer: MEDICARE

## 2024-12-20 ENCOUNTER — HOSPITAL ENCOUNTER (OUTPATIENT)
Dept: RADIOLOGY | Facility: HOSPITAL | Age: 69
Discharge: HOME | End: 2024-12-20
Payer: MEDICARE

## 2024-12-20 VITALS
BODY MASS INDEX: 34.17 KG/M2 | WEIGHT: 181 LBS | HEART RATE: 92 BPM | HEIGHT: 61 IN | OXYGEN SATURATION: 96 % | TEMPERATURE: 97.8 F | SYSTOLIC BLOOD PRESSURE: 136 MMHG | DIASTOLIC BLOOD PRESSURE: 88 MMHG

## 2024-12-20 VITALS
WEIGHT: 181 LBS | HEART RATE: 87 BPM | HEIGHT: 61 IN | BODY MASS INDEX: 34.17 KG/M2 | SYSTOLIC BLOOD PRESSURE: 156 MMHG | TEMPERATURE: 98 F | DIASTOLIC BLOOD PRESSURE: 70 MMHG | OXYGEN SATURATION: 96 % | RESPIRATION RATE: 18 BRPM

## 2024-12-20 DIAGNOSIS — G91.2 NORMAL PRESSURE HYDROCEPHALUS (HCC): ICD-10-CM

## 2024-12-20 DIAGNOSIS — G91.2 NPH (NORMAL PRESSURE HYDROCEPHALUS) (HCC): Primary | ICD-10-CM

## 2024-12-20 LAB
APTT PPP: 30 SECONDS (ref 23–34)
INR PPP: 0.94 (ref 0.85–1.19)
PLATELET # BLD AUTO: 277 THOUSANDS/UL (ref 149–390)
PMV BLD AUTO: 8.9 FL (ref 8.9–12.7)
PROTHROMBIN TIME: 13.3 SECONDS (ref 12.3–15)

## 2024-12-20 PROCEDURE — 85610 PROTHROMBIN TIME: CPT | Performed by: RADIOLOGY

## 2024-12-20 PROCEDURE — 62329 THER SPI PNXR CSF FLUOR/CT: CPT

## 2024-12-20 PROCEDURE — 97162 PT EVAL MOD COMPLEX 30 MIN: CPT

## 2024-12-20 PROCEDURE — 99215 OFFICE O/P EST HI 40 MIN: CPT | Performed by: NEUROLOGICAL SURGERY

## 2024-12-20 PROCEDURE — 85730 THROMBOPLASTIN TIME PARTIAL: CPT | Performed by: RADIOLOGY

## 2024-12-20 PROCEDURE — 85049 AUTOMATED PLATELET COUNT: CPT | Performed by: RADIOLOGY

## 2024-12-20 RX ORDER — ACETAMINOPHEN 325 MG/1
650 TABLET ORAL EVERY 4 HOURS PRN
Status: DISCONTINUED | OUTPATIENT
Start: 2024-12-20 | End: 2024-12-21 | Stop reason: HOSPADM

## 2024-12-20 RX ORDER — LIDOCAINE HYDROCHLORIDE 10 MG/ML
5 INJECTION, SOLUTION EPIDURAL; INFILTRATION; INTRACAUDAL; PERINEURAL
Status: DISCONTINUED | OUTPATIENT
Start: 2024-12-20 | End: 2024-12-21 | Stop reason: HOSPADM

## 2024-12-20 RX ADMIN — ACETAMINOPHEN 325 MG: 325 TABLET, FILM COATED ORAL at 10:48

## 2024-12-20 NOTE — NURSING NOTE
Patient tolerated LP well. Returned to SPU bed 5. Lying flat. No complaints at this time. Discharge instructions reviewed with patient and patient's  Lux. Understanding of instructions verbalized. No questions at this time.

## 2024-12-20 NOTE — PROGRESS NOTES
PT Evaluation     Today's date: 2024  Patient name: Radha Covington  : 1955  MRN: [id number]  Referring provider: Los Schmitt PA-C  Dx:   Encounter Diagnosis     ICD-10-CM    1. Normal pressure hydrocephalus (HCC)  G91.2 Ambulatory referral to Physical Therapy          Start Time: 1215  Stop Time: 1315  Total time in clinic (min): 60 minutes    Assessment  Impairments: abnormal coordination, abnormal gait, abnormal muscle firing, abnormal or restricted ROM, abnormal movement, activity intolerance, impaired balance, impaired physical strength, lacks appropriate home exercise program, safety issue, weight-bearing intolerance, unable to perform ADL, attention deficits, visual perception, participation limitations and activity limitations  Symptom irritability: high    Assessment details: Radha Covington who is attending skilled PT for a gait assessment at the [facility] clinic. Due to pt's objective date and history of falls, pt is at an increased risk for future falls. Pt and pt's family members were educated on objective data and on pt's benefit of following up with neurosurgery and skilled PT as needed.    Understanding of Dx/Px/POC: fair     Prognosis: fair    Goals  STG  1. Follow-up with skilled PT as needed.   2. Follow-up with neurosurgery as needed.   LTG  1. Follow-up with skilled PT as needed.   2. Follow-up with neurosurgery as needed.       Plan    Planned therapy interventions: activity modification, ADL training, ADL retraining, abdominal trunk stabilization, balance/weight bearing training, balance, body mechanics training, fine motor coordination training, flexibility, functional ROM exercises, gait training, graded exercise, home exercise program, therapeutic exercise, therapeutic activities, therapeutic training, patient/caregiver education and neuromuscular re-education    Frequency: 1 visit.  Therapy duration (weeks): 1 visit.  Plan of Care beginning date: 2024  Treatment plan discussed  "with: patient  Plan details: Thank you for referring Radha Covington to PT at [facility] and for the opportunity to coordinate care.          Subjective Evaluation    History of Present Illness  Mechanism of injury: Patient is attending skilled PT for a gait assessment at the [facility] clinic. Pt's  present at . Pt was completed Home PT for balance and strengthening. Decided to got Neurology per her PT suggestion in the summertime. Went to Neuro where lumbar puncture was suggested. Pt and  estimate about at least 4-5 falls within the past year, though she has not had any within the past month. Pt reports difficulty with walking, stairs at times. Pt lives with her  in multi-level home with bed and bath on the 2nd floor. Pt and  endorse some difficulty with memory. Pt uses a RW, Rollator, SPC where she has been using the SPC \"90% of the time\".     Quality of life: good    Patient Goals  Patient goals for therapy: decreased pain, increased motion, return to sport/leisure activities, independence with ADLs/IADLs and increased strength    Pain  Current pain ratin  At best pain ratin  At worst pain ratin  Location: HA, mild pain in low back  Quality: dull ache          Objective    Vitals: 151/68 SOS, reported 2/10 HA to start which decreased overtime.     Gait Quality :   Wide Base of Support:    Present  Outwardly rotated feet:   Present  Step Height Diminished:  Present   Gait Speed:  10 meter walk test (<1m/s predictive of falls): 3.5 m/s    Comments: MinAx1 STS  Fall Risk: (yes/no)  TUG (<12 sec predictive of falls): 47s  FGA (< 24 predictive of falls): Not completed d/t lack of time  Number of falls in the last month: 0, reports at least 5 falls within the past year  Cognition:              MOCA (<26 cognitive deficit present):     Comments: Required increased time    A&Ox3 (unaware of date): A&Ox4    Urinary incontinence: yes B&B     Shunt Placed yes or no: No   "   Recommendations:  Physical Therapy: yes  Speech Therapy: N/a  Occupational Therapy: yes

## 2024-12-20 NOTE — PROGRESS NOTES
Name: Radha Covington      : 1955      MRN: [id number]  Encounter Provider: Tino Gee MD  Encounter Date: 2024   Encounter department: [facility]  :  Assessment & Plan  NPH (normal pressure hydrocephalus) (HCC)  Patient presents for ongoing follow up regarding possible NPH  Concern for ventriculomegaly on imaging alongside clinical concerns for cognitive impairment, urinary incontinence and shuffling gait  Was seen by Dr. Gee 2024, recommended high volume LP and post LP assessment however post LP assessment was never completed; subjectively patient did not report any improvement in her symptoms  Seen by neurology who recommended repeat LP and post LP assessment which patient is now agreeable to    Pre LP assessment ():  Gait quality:  + Wide base of support, outwardly rotated feet, diminished step height  10 meter walk test:  could only do 3m due to space, 0.3m/s  TU sec  FGA:    MOCA :      Post LP assessment ():  LP:  30cc drained, opening pressure 16, closing pressure 5  Gait quality:  + Wide base of support, outwardly rotated feet, diminished step height  10 meter walk test:  3.5m/s  TU sec  FGA:  not completed due to lack of time  MOCA:     Imaging:  MRI brain wo contrast 6/3/2024: Mild diffuse ventriculomegaly. No obstructive hydrocephalus.     Plan:  Reviewed therapy assessment with patient and .    Her gait quality did not change from pre LP testing  10 meter test is not a direct comparison however she does seem to have improved post LP  Her TUG assessment worsened - now taking 47 seconds as opposed to 35 seconds to complete the assessment  FGA could not be completed unfortunately due to time constraints and therefore comparison cannot be made  MOCA with only 2 point improvement  Discussed that I am concerned that shunt diversion may not be a viable option for patient.  Subjectively both patient and  do not  feel she has made any improvement post LP.  We discussed options moving forward including monitoring patient's exam over the weekend to see if any subjective improvements are noted with plan to follow up on Monday.  They are agreeable to this.  Dr. Gee to discuss further with patient and , see attestation for final recommendations and follow-up plan             History of Present Illness   69-year-old female with past medical history significant for breast cancer, chronic kidney disease, hyperlipidemia, lupus who presents for ongoing follow-up of ventriculomegaly and possible NPH.    Patient is accompanied by her .  She underwent high-volume LP and post LP assessment today.  Both  and patient do not feel any improvement has been made after the LP although they have been up for quite some time and patient is very tired.  She did have a bowel incontinence episode just prior to the appointment.   is worried that if surgical intervention is not done that they are out of options in regard to how to improve her current state but he is also wary about the risks of surgery.  She does continue to have both bowel and urinary incontinence, cognitive impairment and ambulatory dysfunction.      Review of Systems   Constitutional: Negative.    HENT: Negative.     Eyes: Negative.    Respiratory: Negative.     Cardiovascular: Negative.    Gastrointestinal: Negative.    Endocrine: Negative.    Genitourinary: Negative.         Patient experiences both bowel and bladder incontinence        Musculoskeletal:  Positive for gait problem (unsteadiness, ambulates with cane or walker).   Allergic/Immunologic: Negative.    Neurological:  Negative for headaches.   Hematological: Negative.    Psychiatric/Behavioral:  Positive for decreased concentration. Negative for confusion.         Cognitive difficulty sometimes, trouble understanding things, does not feel confused     I have personally reviewed the MA's review  of systems and made changes as necessary.    Pertinent Medical History         Medical History Reviewed by provider this encounter:     .  Past Medical History   Past Medical History:   Diagnosis Date    Arthritis     Breast cancer (HCC) 03/01/2017    right breast intraductal carcinoma performed at [facility]    Cancer (HCC) 3/2017    rt. breast cancer    Chronic kidney disease     History of radiation therapy 08/01/2017    right breast    Hyperlipidemia     Lupus 2019    Diagnosis is lupus.    Urinary tract infection 2006     Past Surgical History:   Procedure Laterality Date    BREAST BIOPSY Left 1979    benign    BREAST EXCISIONAL BIOPSY Right 04/2017    intraductal carcinoma needle loc performed at [facility]    BREAST LUMPECTOMY Right 03/01/2017    BREAST SURGERY      CHOLECYSTECTOMY      COLONOSCOPY      ESOPHAGOGASTRODUODENOSCOPY      FL LUMBAR PUNCTURE THERAPEUTIC  10/4/2024    FL LUMBAR PUNCTURE THERAPEUTIC  12/20/2024    OR COLONOSCOPY FLX DX W/COLLJ SPEC WHEN PFRMD N/A 10/23/2017    Procedure: COLONOSCOPY;  Surgeon: Riky Fortune MD;  Location: [facility];  Service: Colorectal     Family History   Problem Relation Age of Onset    Breast cancer Sister 56    Uterine cancer Sister     BRCA1 Negative Sister     BRCA2 Negative Sister     Breast cancer Maternal Grandmother 75    Cancer Maternal Grandmother         Left breast cancer    Breast cancer Cousin 50    Heart disease Mother     Hypertension Mother     Cancer Father         Bladder cancer    Heart disease Father     Hypertension Father     Cancer Sister         Left breast cancer      reports that she has never smoked. She has never used smokeless tobacco. She reports that she does not currently use alcohol. She reports that she does not use drugs.  Current Outpatient Medications on File Prior to Visit   Medication Sig Dispense Refill    azelastine (ASTELIN) 0.1 % nasal spray 1 spray into each nostril daily Use in each nostril as directed      Calcium  Ascorbate 500 MG TABS Take 500 mg by mouth in the morning      Cholecalciferol (Vitamin D3) 125 MCG (5000 UT) CAPS Take 1 capsule by mouth daily      co-enzyme Q-10 30 MG capsule Take 30 mg by mouth daily      denosumab (PROLIA) 60 mg/mL Inject 60 mg under the skin      DULoxetine (CYMBALTA) 30 mg delayed release capsule take 1 capsule by mouth once daily 90 capsule 3    ezetimibe (ZETIA) 10 mg tablet TAKE 1 TABLET DAILY 90 tablet 1    hydroxychloroquine (PLAQUENIL) 200 mg tablet Take 200 mg by mouth daily  0    loperamide (IMODIUM) 2 mg/15 mL oral liquid if needed      METAMUCIL FIBER PO Take by mouth      Multiple Vitamin (MULTI-VITAMIN DAILY PO) Take 500 mg by mouth daily       timolol (TIMOPTIC) 0.5 % ophthalmic solution Administer 1 drop to both eyes every 12 (twelve) hours      fluticasone (FLONASE) 50 mcg/act nasal spray 2 sprays into each nostril daily (Patient not taking: Reported on 11/12/2024)       Current Facility-Administered Medications on File Prior to Visit   Medication Dose Route Frequency Provider Last Rate Last Admin    acetaminophen (TYLENOL) tablet 650 mg  650 mg Oral Q4H PRN Pallav N Shah, MD   325 mg at 12/20/24 1048    lidocaine (PF) (XYLOCAINE-MPF) 1 % injection 5 mL  5 mL Injection Once in imaging Pallav N Shah, MD         Allergies   Allergen Reactions    Bactrim [Sulfamethoxazole-Trimethoprim] Anaphylaxis and Swelling     Swelling of throat and nose; itching    Other      clear tape _blisters    Peaches, potatoes, apples- per allergy testing per pt    Molds & Smuts     Pollen Extract Itching    Wound Dressings Rash      Current Outpatient Medications on File Prior to Visit   Medication Sig Dispense Refill    azelastine (ASTELIN) 0.1 % nasal spray 1 spray into each nostril daily Use in each nostril as directed      Calcium Ascorbate 500 MG TABS Take 500 mg by mouth in the morning      Cholecalciferol (Vitamin D3) 125 MCG (5000 UT) CAPS Take 1 capsule by mouth daily      co-enzyme Q-10 30  "MG capsule Take 30 mg by mouth daily      denosumab (PROLIA) 60 mg/mL Inject 60 mg under the skin      DULoxetine (CYMBALTA) 30 mg delayed release capsule take 1 capsule by mouth once daily 90 capsule 3    ezetimibe (ZETIA) 10 mg tablet TAKE 1 TABLET DAILY 90 tablet 1    hydroxychloroquine (PLAQUENIL) 200 mg tablet Take 200 mg by mouth daily  0    loperamide (IMODIUM) 2 mg/15 mL oral liquid if needed      METAMUCIL FIBER PO Take by mouth      Multiple Vitamin (MULTI-VITAMIN DAILY PO) Take 500 mg by mouth daily       timolol (TIMOPTIC) 0.5 % ophthalmic solution Administer 1 drop to both eyes every 12 (twelve) hours      fluticasone (FLONASE) 50 mcg/act nasal spray 2 sprays into each nostril daily (Patient not taking: Reported on 11/12/2024)       Current Facility-Administered Medications on File Prior to Visit   Medication Dose Route Frequency Provider Last Rate Last Admin    acetaminophen (TYLENOL) tablet 650 mg  650 mg Oral Q4H PRN Pallav N Shah, MD   325 mg at 12/20/24 1048    lidocaine (PF) (XYLOCAINE-MPF) 1 % injection 5 mL  5 mL Injection Once in imaging Pallav N Shah, MD          Social History     Tobacco Use    Smoking status: Never    Smokeless tobacco: Never   Vaping Use    Vaping status: Never Used   Substance and Sexual Activity    Alcohol use: Not Currently     Comment: rarely    Drug use: Never    Sexual activity: Yes     Partners: Male     Birth control/protection: Condom Male        Objective   /88 (BP Location: Left arm, Patient Position: Sitting, Cuff Size: Adult)   Pulse 92   Temp 97.8 °F (36.6 °C) (Temporal)   Ht 5' 1\" (1.549 m)   Wt 82.1 kg (181 lb)   LMP  (LMP Unknown)   SpO2 96%   BMI 34.20 kg/m²     Physical Exam  Constitutional:       General: She is awake.      Appearance: Normal appearance.      Comments: In wheelchair for transportation   HENT:      Head: Normocephalic and atraumatic.      Right Ear: Hearing normal.      Left Ear: Hearing normal.   Eyes:      " Conjunctiva/sclera: Conjunctivae normal.   Cardiovascular:      Rate and Rhythm: Normal rate.   Pulmonary:      Effort: Pulmonary effort is normal. No respiratory distress.   Skin:     General: Skin is warm and dry.   Neurological:      Mental Status: She is alert.      Motor: Motor strength is normal.  Psychiatric:         Attention and Perception: Attention and perception normal.         Mood and Affect: Mood and affect normal.         Speech: Speech normal.         Behavior: Behavior normal. Behavior is cooperative.         Thought Content: Thought content normal.         Cognition and Memory: Cognition and memory normal.         Judgment: Judgment normal.       Neurological Exam  Mental Status  Awake and alert. Memory is normal. Speech is normal.  Knows she is at [facility] but initially says [place], easily corrects to [place].    Cranial Nerves  CN VIII:  Right: Hearing is normal.  Left: Hearing is normal.    Motor  Normal muscle bulk throughout. Strength is 5/5 throughout all four extremities.      Radiology Results Review: I personally reviewed the following image studies in PACS and associated radiology reports: MRI brain. My interpretation of the radiology images/reports is: as noted above.    Administrative Statements   I have spent a total time of 40 minutes in caring for this patient on the day of the visit/encounter including Diagnostic results, Prognosis, Risks and benefits of tx options, Instructions for management, Patient and family education, Importance of tx compliance, Risk factor reductions, Impressions, Counseling / Coordination of care, Documenting in the medical record, Reviewing / ordering tests, medicine, procedures  , and Obtaining or reviewing history  .

## 2024-12-20 NOTE — PERIOPERATIVE NURSING NOTE
Attempted to elevate pt HOB to 30 degrees, pt did not tolerate well. C/o pain in head, initially pain at 5/10. Given tylenol, HOB reduced, and pain down to 4/10. Made nurse aware and Dr. Diane assessed pt. Advised to let pt rest and will reevaluate in 30 mins. However PT unable to adjust start time so it was advised by Dr Diane to have pt dress and head to PT.

## 2024-12-23 ENCOUNTER — RA CDI HCC (OUTPATIENT)
Dept: OTHER | Facility: HOSPITAL | Age: 69
End: 2024-12-23

## 2024-12-26 NOTE — PROGRESS NOTES
Discharge     Today's date: 2024  Patient name: Radha Covington  : 1955  MRN: [id number]  Referring provider: Los Schmitt PA-C  Dx:   Encounter Diagnosis     ICD-10-CM    1. Normal pressure hydrocephalus (HCC)  G91.2 Ambulatory referral to Physical Therapy     PT plan of care cert/re-cert            Assessment: Pt successfully completed 1 time NPH gait assessment. As such, PT will d/c from skilled PT services at this time.

## 2024-12-30 ENCOUNTER — OFFICE VISIT (OUTPATIENT)
Dept: FAMILY MEDICINE CLINIC | Facility: CLINIC | Age: 69
End: 2024-12-30
Payer: MEDICARE

## 2024-12-30 ENCOUNTER — TELEPHONE (OUTPATIENT)
Age: 69
End: 2024-12-30

## 2024-12-30 VITALS
RESPIRATION RATE: 20 BRPM | SYSTOLIC BLOOD PRESSURE: 136 MMHG | TEMPERATURE: 97.3 F | DIASTOLIC BLOOD PRESSURE: 84 MMHG | HEART RATE: 84 BPM

## 2024-12-30 DIAGNOSIS — Z85.3 HISTORY OF BREAST CANCER: ICD-10-CM

## 2024-12-30 DIAGNOSIS — J02.9 PHARYNGITIS, UNSPECIFIED ETIOLOGY: ICD-10-CM

## 2024-12-30 DIAGNOSIS — J01.00 ACUTE MAXILLARY SINUSITIS, RECURRENCE NOT SPECIFIED: ICD-10-CM

## 2024-12-30 DIAGNOSIS — R05.1 ACUTE COUGH: ICD-10-CM

## 2024-12-30 DIAGNOSIS — H66.93 OTITIS OF BOTH EARS: Primary | ICD-10-CM

## 2024-12-30 DIAGNOSIS — N18.31 STAGE 3A CHRONIC KIDNEY DISEASE (HCC): ICD-10-CM

## 2024-12-30 PROCEDURE — G2211 COMPLEX E/M VISIT ADD ON: HCPCS | Performed by: FAMILY MEDICINE

## 2024-12-30 PROCEDURE — 99214 OFFICE O/P EST MOD 30 MIN: CPT | Performed by: FAMILY MEDICINE

## 2024-12-30 RX ORDER — METHYLPREDNISOLONE 4 MG/1
TABLET ORAL
Qty: 21 EACH | Refills: 0 | Status: SHIPPED | OUTPATIENT
Start: 2024-12-30

## 2024-12-30 RX ORDER — AMOXICILLIN 500 MG/1
500 CAPSULE ORAL EVERY 8 HOURS SCHEDULED
Qty: 30 CAPSULE | Refills: 0 | Status: SHIPPED | OUTPATIENT
Start: 2024-12-30 | End: 2025-01-09

## 2024-12-30 RX ORDER — DEXTROMETHORPHAN HYDROBROMIDE AND PROMETHAZINE HYDROCHLORIDE 15; 6.25 MG/5ML; MG/5ML
5 SYRUP ORAL 4 TIMES DAILY PRN
Qty: 180 ML | Refills: 0 | Status: SHIPPED | OUTPATIENT
Start: 2024-12-30

## 2024-12-30 NOTE — TELEPHONE ENCOUNTER
Patient called stating she has a sore throat and cough.     Asking if Dr. Rosen would send a script over for an antibiotic and cough meds.    [facility] - [place]

## 2024-12-30 NOTE — ASSESSMENT & PLAN NOTE
Lab Results   Component Value Date    EGFR 53 11/03/2024    EGFR 62 03/18/2024    EGFR 54 12/03/2023    CREATININE 1.06 11/03/2024    CREATININE 0.93 03/18/2024    CREATININE 1.04 12/03/2023   Followed by nephrology

## 2024-12-30 NOTE — PROGRESS NOTES
Name: Radha Covington      : 1955      MRN: [id number]  Encounter Provider: Domingo Rosen DO  Encounter Date: 2024   [facility]  :  Assessment & Plan  Otitis of both ears  We will provide a Medrol Dosepak a Medrol Dosepak and Promethazine DM  Orders:  •  amoxicillin (AMOXIL) 500 mg capsule; Take 1 capsule (500 mg total) by mouth every 8 (eight) hours for 10 days  •  methylPREDNISolone 4 MG tablet therapy pack; Use as directed on package    Pharyngitis, unspecified etiology  As above  Orders:  •  amoxicillin (AMOXIL) 500 mg capsule; Take 1 capsule (500 mg total) by mouth every 8 (eight) hours for 10 days  •  methylPREDNISolone 4 MG tablet therapy pack; Use as directed on package    Acute maxillary sinusitis, recurrence not specified  As above  Orders:  •  amoxicillin (AMOXIL) 500 mg capsule; Take 1 capsule (500 mg total) by mouth every 8 (eight) hours for 10 days  •  methylPREDNISolone 4 MG tablet therapy pack; Use as directed on package    Acute cough  Promethazine DM 5 cc every 4 hours as needed  Orders:  •  promethazine-dextromethorphan (PHENERGAN-DM) 6.25-15 mg/5 mL oral syrup; Take 5 mL by mouth 4 (four) times a day as needed for cough    History of breast cancer  Currently in remission       Stage 3a chronic kidney disease (HCC)  Lab Results   Component Value Date    EGFR 53 2024    EGFR 62 2024    EGFR 54 2023    CREATININE 1.06 2024    CREATININE 0.93 2024    CREATININE 1.04 2023   Followed by nephrology                History of Present Illness     Patient presents with a complaint of being sick for 4 days with cough productive of yellowish phlegm sore throat and right ear ache    Cough  Associated symptoms include ear pain and a sore throat. Pertinent negatives include no chest pain, chills, fever, rash or shortness of breath.   Sore Throat   Associated symptoms include coughing and ear pain. Pertinent  negatives include no abdominal pain, shortness of breath or vomiting.   Earache   Associated symptoms include coughing and a sore throat. Pertinent negatives include no abdominal pain, rash or vomiting.     Review of Systems   Constitutional:  Negative for chills and fever.   HENT:  Positive for ear pain and sore throat.    Eyes:  Negative for pain and visual disturbance.   Respiratory:  Positive for cough. Negative for shortness of breath.    Cardiovascular:  Negative for chest pain and palpitations.   Gastrointestinal:  Negative for abdominal pain and vomiting.   Genitourinary:  Negative for dysuria and hematuria.   Musculoskeletal:  Negative for arthralgias and back pain.   Skin:  Negative for color change and rash.   Neurological:  Negative for seizures and syncope.   All other systems reviewed and are negative.      Objective   /84   Pulse 84   Temp (!) 97.3 °F (36.3 °C)   Resp 20   LMP  (LMP Unknown)      Physical Exam  Constitutional:       Appearance: Normal appearance. She is obese.   HENT:      Head: Normocephalic and atraumatic.      Right Ear: Ear canal and external ear normal. Tympanic membrane is erythematous and bulging.      Left Ear: Ear canal and external ear normal. Tympanic membrane is bulging.      Nose: Congestion and rhinorrhea present.      Right Sinus: Maxillary sinus tenderness present.      Left Sinus: Maxillary sinus tenderness present.      Mouth/Throat:      Mouth: Mucous membranes are moist.      Pharynx: Oropharynx is clear. Posterior oropharyngeal erythema present.   Cardiovascular:      Rate and Rhythm: Normal rate and regular rhythm.      Pulses: Normal pulses.      Heart sounds: Normal heart sounds.   Pulmonary:      Effort: Pulmonary effort is normal.      Breath sounds: Normal breath sounds.   Abdominal:      General: Abdomen is flat. Bowel sounds are normal.      Palpations: Abdomen is soft.   Musculoskeletal:         General: Normal range of motion.      Cervical  back: Normal range of motion and neck supple.   Skin:     General: Skin is warm and dry.   Neurological:      General: No focal deficit present.      Mental Status: She is alert and oriented to person, place, and time.   Psychiatric:         Mood and Affect: Mood normal.         Behavior: Behavior normal.

## 2025-01-06 ENCOUNTER — OFFICE VISIT (OUTPATIENT)
Dept: FAMILY MEDICINE CLINIC | Facility: CLINIC | Age: 70
End: 2025-01-06
Payer: MEDICARE

## 2025-01-06 VITALS
RESPIRATION RATE: 16 BRPM | WEIGHT: 180 LBS | HEART RATE: 76 BPM | HEIGHT: 61 IN | SYSTOLIC BLOOD PRESSURE: 134 MMHG | DIASTOLIC BLOOD PRESSURE: 84 MMHG | BODY MASS INDEX: 33.99 KG/M2 | TEMPERATURE: 97.5 F

## 2025-01-06 DIAGNOSIS — Z12.31 ENCOUNTER FOR SCREENING MAMMOGRAM FOR MALIGNANT NEOPLASM OF BREAST: ICD-10-CM

## 2025-01-06 DIAGNOSIS — Z85.3 HISTORY OF BREAST CANCER: ICD-10-CM

## 2025-01-06 DIAGNOSIS — Z00.00 MEDICARE ANNUAL WELLNESS VISIT, SUBSEQUENT: Primary | ICD-10-CM

## 2025-01-06 DIAGNOSIS — N18.31 STAGE 3A CHRONIC KIDNEY DISEASE (HCC): ICD-10-CM

## 2025-01-06 DIAGNOSIS — E66.01 OBESITY, MORBID (HCC): ICD-10-CM

## 2025-01-06 DIAGNOSIS — R15.9 INCONTINENCE OF FECES WITH FECAL URGENCY: ICD-10-CM

## 2025-01-06 DIAGNOSIS — H69.93 DYSFUNCTION OF BOTH EUSTACHIAN TUBES: ICD-10-CM

## 2025-01-06 DIAGNOSIS — R26.89 BALANCE PROBLEM: ICD-10-CM

## 2025-01-06 DIAGNOSIS — M35.1 MIXED CONNECTIVE TISSUE DISEASE (HCC): ICD-10-CM

## 2025-01-06 DIAGNOSIS — R26.9 GAIT DISTURBANCE: ICD-10-CM

## 2025-01-06 DIAGNOSIS — R32 URINARY INCONTINENCE, UNSPECIFIED TYPE: ICD-10-CM

## 2025-01-06 DIAGNOSIS — M32.9 SYSTEMIC LUPUS ERYTHEMATOSUS, UNSPECIFIED SLE TYPE, UNSPECIFIED ORGAN INVOLVEMENT STATUS (HCC): ICD-10-CM

## 2025-01-06 DIAGNOSIS — E55.9 VITAMIN D DEFICIENCY: ICD-10-CM

## 2025-01-06 DIAGNOSIS — R15.2 INCONTINENCE OF FECES WITH FECAL URGENCY: ICD-10-CM

## 2025-01-06 PROBLEM — R39.198 DIFFICULTY URINATING: Status: RESOLVED | Noted: 2023-01-06 | Resolved: 2025-01-06

## 2025-01-06 PROBLEM — E78.5 HYPERLIPIDEMIA: Status: RESOLVED | Noted: 2022-02-09 | Resolved: 2025-01-06

## 2025-01-06 PROBLEM — R19.7 DIARRHEA: Status: RESOLVED | Noted: 2018-12-10 | Resolved: 2025-01-06

## 2025-01-06 PROCEDURE — G0439 PPPS, SUBSEQ VISIT: HCPCS | Performed by: FAMILY MEDICINE

## 2025-01-06 PROCEDURE — 99214 OFFICE O/P EST MOD 30 MIN: CPT | Performed by: FAMILY MEDICINE

## 2025-01-06 RX ORDER — OXYBUTYNIN CHLORIDE 5 MG/1
5 TABLET ORAL 3 TIMES DAILY
Qty: 90 TABLET | Refills: 2 | Status: SHIPPED | OUTPATIENT
Start: 2025-01-06

## 2025-01-06 RX ORDER — FLUTICASONE PROPIONATE 50 MCG
1 SPRAY, SUSPENSION (ML) NASAL DAILY
Qty: 3 ML | Refills: 1 | Status: SHIPPED | OUTPATIENT
Start: 2025-01-06 | End: 2025-01-08

## 2025-01-06 NOTE — ASSESSMENT & PLAN NOTE
Lab Results   Component Value Date    EGFR 53 11/03/2024    EGFR 62 03/18/2024    EGFR 54 12/03/2023    CREATININE 1.06 11/03/2024    CREATININE 0.93 03/18/2024    CREATININE 1.04 12/03/2023

## 2025-01-06 NOTE — PATIENT INSTRUCTIONS
Medicare Preventive Visit Patient Instructions  Thank you for completing your Welcome to Medicare Visit or Medicare Annual Wellness Visit today. Your next wellness visit will be due in one year (1/7/2026).  The screening/preventive services that you may require over the next 5-10 years are detailed below. Some tests may not apply to you based off risk factors and/or age. Screening tests ordered at today's visit but not completed yet may show as past due. Also, please note that scanned in results may not display below.  Preventive Screenings:  Service Recommendations Previous Testing/Comments   Colorectal Cancer Screening  * Colonoscopy    * Fecal Occult Blood Test (FOBT)/Fecal Immunochemical Test (FIT)  * Fecal DNA/Cologuard Test  * Flexible Sigmoidoscopy Age: 45-75 years old   Colonoscopy: every 10 years (may be performed more frequently if at higher risk)  OR  FOBT/FIT: every 1 year  OR  Cologuard: every 3 years  OR  Sigmoidoscopy: every 5 years  Screening may be recommended earlier than age 45 if at higher risk for colorectal cancer. Also, an individualized decision between you and your healthcare provider will decide whether screening between the ages of 76-85 would be appropriate. Colonoscopy: 08/26/2021  FOBT/FIT: Not on file  Cologuard: Not on file  Sigmoidoscopy: Not on file    Screening Current     Breast Cancer Screening Age: 40+ years old  Frequency: every 1-2 years  Not required if history of left and right mastectomy Mammogram: 02/10/2022    History Breast Cancer   Cervical Cancer Screening Between the ages of 21-29, pap smear recommended once every 3 years.   Between the ages of 30-65, can perform pap smear with HPV co-testing every 5 years.   Recommendations may differ for women with a history of total hysterectomy, cervical cancer, or abnormal pap smears in past. Pap Smear: Not on file    Screening Not Indicated   Hepatitis C Screening Once for adults born between 1945 and 1965  More frequently in  patients at high risk for Hepatitis C Hep C Antibody: 03/01/2021    Screening Current   Diabetes Screening 1-2 times per year if you're at risk for diabetes or have pre-diabetes Fasting glucose: 145 mg/dL (11/3/2024)  A1C: 5.8 % (1/22/2021)  Screening Current   Cholesterol Screening Once every 5 years if you don't have a lipid disorder. May order more often based on risk factors. Lipid panel: 03/18/2024    Screening Not Indicated  History Lipid Disorder     Other Preventive Screenings Covered by Medicare:  Abdominal Aortic Aneurysm (AAA) Screening: covered once if your at risk. You're considered to be at risk if you have a family history of AAA.  Lung Cancer Screening: covers low dose CT scan once per year if you meet all of the following conditions: (1) Age 55-77; (2) No signs or symptoms of lung cancer; (3) Current smoker or have quit smoking within the last 15 years; (4) You have a tobacco smoking history of at least 20 pack years (packs per day multiplied by number of years you smoked); (5) You get a written order from a healthcare provider.  Glaucoma Screening: covered annually if you're considered high risk: (1) You have diabetes OR (2) Family history of glaucoma OR (3)  aged 50 and older OR (4)  American aged 65 and older  Osteoporosis Screening: covered every 2 years if you meet one of the following conditions: (1) You're estrogen deficient and at risk for osteoporosis based off medical history and other findings; (2) Have a vertebral abnormality; (3) On glucocorticoid therapy for more than 3 months; (4) Have primary hyperparathyroidism; (5) On osteoporosis medications and need to assess response to drug therapy.   Last bone density test (DXA Scan): 07/19/2023.  HIV Screening: covered annually if you're between the age of 15-65. Also covered annually if you are younger than 15 and older than 65 with risk factors for HIV infection. For pregnant patients, it is covered up to 3 times per  pregnancy.    Immunizations:  Immunization Recommendations   Influenza Vaccine Annual influenza vaccination during flu season is recommended for all persons aged >= 6 months who do not have contraindications   Pneumococcal Vaccine   * Pneumococcal conjugate vaccine = PCV13 (Prevnar 13), PCV15 (Vaxneuvance), PCV20 (Prevnar 20)  * Pneumococcal polysaccharide vaccine = PPSV23 (Pneumovax) Adults 19-63 yo with certain risk factors or if 65+ yo  If never received any pneumonia vaccine: recommend Prevnar 20 (PCV20)  Give PCV20 if previously received 1 dose of PCV13 or PPSV23   Hepatitis B Vaccine 3 dose series if at intermediate or high risk (ex: diabetes, end stage renal disease, liver disease)   Respiratory syncytial virus (RSV) Vaccine - COVERED BY MEDICARE PART D  * RSVPreF3 (Arexvy) CDC recommends that adults 60 years of age and older may receive a single dose of RSV vaccine using shared clinical decision-making (SCDM)   Tetanus (Td) Vaccine - COST NOT COVERED BY MEDICARE PART B Following completion of primary series, a booster dose should be given every 10 years to maintain immunity against tetanus. Td may also be given as tetanus wound prophylaxis.   Tdap Vaccine - COST NOT COVERED BY MEDICARE PART B Recommended at least once for all adults. For pregnant patients, recommended with each pregnancy.   Shingles Vaccine (Shingrix) - COST NOT COVERED BY MEDICARE PART B  2 shot series recommended in those 19 years and older who have or will have weakened immune systems or those 50 years and older     Health Maintenance Due:      Topic Date Due   • Breast Cancer Screening: Mammogram  02/10/2023   • Colorectal Cancer Screening  08/26/2031   • Hepatitis C Screening  Completed     Immunizations Due:      Topic Date Due   • Pneumococcal Vaccine: 65+ Years (1 of 1 - PCV) Never done     Advance Directives   What are advance directives?  Advance directives are legal documents that state your wishes and plans for medical care.  These plans are made ahead of time in case you lose your ability to make decisions for yourself. Advance directives can apply to any medical decision, such as the treatments you want, and if you want to donate organs.   What are the types of advance directives?  There are many types of advance directives, and each state has rules about how to use them. You may choose a combination of any of the following:  Living will:  This is a written record of the treatment you want. You can also choose which treatments you do not want, which to limit, and which to stop at a certain time. This includes surgery, medicine, IV fluid, and tube feedings.   Durable power of  for healthcare (DPAHC):  This is a written record that states who you want to make healthcare choices for you when you are unable to make them for yourself. This person, called a proxy, is usually a family member or a friend. You may choose more than 1 proxy.  Do not resuscitate (DNR) order:  A DNR order is used in case your heart stops beating or you stop breathing. It is a request not to have certain forms of treatment, such as CPR. A DNR order may be included in other types of advance directives.  Medical directive:  This covers the care that you want if you are in a coma, near death, or unable to make decisions for yourself. You can list the treatments you want for each condition. Treatment may include pain medicine, surgery, blood transfusions, dialysis, IV or tube feedings, and a ventilator (breathing machine).  Values history:  This document has questions about your views, beliefs, and how you feel and think about life. This information can help others choose the care that you would choose.  Why are advance directives important?  An advance directive helps you control your care. Although spoken wishes may be used, it is better to have your wishes written down. Spoken wishes can be misunderstood, or not followed. Treatments may be given even if you  do not want them. An advance directive may make it easier for your family to make difficult choices about your care.   Urinary Incontinence   Urinary incontinence (UI)  is when you lose control of your bladder. UI develops because your bladder cannot store or empty urine properly. The 3 most common types of UI are stress incontinence, urge incontinence, or both.  Medicines:   May be given to help strengthen your bladder control. Report any side effects of medication to your healthcare provider.  Do pelvic muscle exercises often:  Your pelvic muscles help you stop urinating. Squeeze these muscles tight for 5 seconds, then relax for 5 seconds. Gradually work up to squeezing for 10 seconds. Do 3 sets of 15 repetitions a day, or as directed. This will help strengthen your pelvic muscles and improve bladder control.  Train your bladder:  Go to the bathroom at set times, such as every 2 hours, even if you do not feel the urge to go. You can also try to hold your urine when you feel the urge to go. For example, hold your urine for 5 minutes when you feel the urge to go. As that becomes easier, hold your urine for 10 minutes.   Self-care:   Keep a UI record.  Write down how often you leak urine and how much you leak. Make a note of what you were doing when you leaked urine.  Drink liquids as directed. You may need to limit the amount of liquid you drink to help control your urine leakage. Do not drink any liquid right before you go to bed. Limit or do not have drinks that contain caffeine or alcohol.   Prevent constipation.  Eat a variety of high-fiber foods. Good examples are high-fiber cereals, beans, vegetables, and whole-grain breads. Walking is the best way to trigger your intestines to have a bowel movement.  Exercise regularly and maintain a healthy weight.  Weight loss and exercise will decrease pressure on your bladder and help you control your leakage.   Use a catheter as directed  to help empty your bladder. A  catheter is a tiny, plastic tube that is put into your bladder to drain your urine.   Go to behavior therapy as directed.  Behavior therapy may be used to help you learn to control your urge to urinate.    Weight Management   Why it is important to manage your weight:  Being overweight increases your risk of health conditions such as heart disease, high blood pressure, type 2 diabetes, and certain types of cancer. It can also increase your risk for osteoarthritis, sleep apnea, and other respiratory problems. Aim for a slow, steady weight loss. Even a small amount of weight loss can lower your risk of health problems.  How to lose weight safely:  A safe and healthy way to lose weight is to eat fewer calories and get regular exercise. You can lose up about 1 pound a week by decreasing the number of calories you eat by 500 calories each day.   Healthy meal plan for weight management:  A healthy meal plan includes a variety of foods, contains fewer calories, and helps you stay healthy. A healthy meal plan includes the following:  Eat whole-grain foods more often.  A healthy meal plan should contain fiber. Fiber is the part of grains, fruits, and vegetables that is not broken down by your body. Whole-grain foods are healthy and provide extra fiber in your diet. Some examples of whole-grain foods are whole-wheat breads and pastas, oatmeal, brown rice, and bulgur.  Eat a variety of vegetables every day.  Include dark, leafy greens such as spinach, kale, mela greens, and mustard greens. Eat yellow and orange vegetables such as carrots, sweet potatoes, and winter squash.   Eat a variety of fruits every day.  Choose fresh or canned fruit (canned in its own juice or light syrup) instead of juice. Fruit juice has very little or no fiber.  Eat low-fat dairy foods.  Drink fat-free (skim) milk or 1% milk. Eat fat-free yogurt and low-fat cottage cheese. Try low-fat cheeses such as mozzarella and other reduced-fat  cheeses.  Choose meat and other protein foods that are low in fat.  Choose beans or other legumes such as split peas or lentils. Choose fish, skinless poultry (chicken or turkey), or lean cuts of red meat (beef or pork). Before you cook meat or poultry, cut off any visible fat.   Use less fat and oil.  Try baking foods instead of frying them. Add less fat, such as margarine, sour cream, regular salad dressing and mayonnaise to foods. Eat fewer high-fat foods. Some examples of high-fat foods include french fries, doughnuts, ice cream, and cakes.  Eat fewer sweets.  Limit foods and drinks that are high in sugar. This includes candy, cookies, regular soda, and sweetened drinks.  Exercise:  Exercise at least 30 minutes per day on most days of the week. Some examples of exercise include walking, biking, dancing, and swimming. You can also fit in more physical activity by taking the stairs instead of the elevator or parking farther away from stores. Ask your healthcare provider about the best exercise plan for you.      © Copyright [employer] 2018 Information is for End User's use only and may not be sold, redistributed or otherwise used for commercial purposes. All illustrations and images included in CareNotes® are the copyrighted property of [employer] Inc[employer] or [employer]

## 2025-01-06 NOTE — PROGRESS NOTES
Name: Radha Covington      : 1955      MRN: [id number]  Encounter Provider: Domingo Rosen DO  Encounter Date: 2025   Encounter department: [facility]    Assessment & Plan  Medicare annual wellness visit, subsequent         Urinary incontinence, unspecified type  We will begin a trial of Ditropan 5 mg 3 times daily  Orders:  •  oxybutynin (DITROPAN) 5 mg tablet; Take 1 tablet (5 mg total) by mouth 3 (three) times a day    Incontinence of feces with fecal urgency  See above  Orders:  •  oxybutynin (DITROPAN) 5 mg tablet; Take 1 tablet (5 mg total) by mouth 3 (three) times a day    Dysfunction of both eustachian tubes  We will add Flonase to Astelin and refer to ENT for evaluation and treatment  Orders:  •  Ambulatory Referral to Otolaryngology; Future  •  fluticasone (FLONASE) 50 mcg/act nasal spray; 1 spray into each nostril daily    History of breast cancer  The patient is 5 years cancer recurrence free we will obtain a screening mammogram  Orders:  •  Mammo screening bilateral w 3d and cad; Future    Gait disturbance  The patient uses a cane and follows with neurology       Balance problem  The patient had has had physical therapy to improve her gait       Mixed connective tissue disease (HCC)  Patient has a history of lupus and follows with rheumatology       Vitamin D deficiency  Laboratories pending       Obesity, morbid (HCC)         Stage 3a chronic kidney disease (HCC)  Lab Results   Component Value Date    EGFR 53 2024    EGFR 62 2024    EGFR 54 2023    CREATININE 1.06 2024    CREATININE 0.93 2024    CREATININE 1.04 2023          Systemic lupus erythematosus, unspecified SLE type, unspecified organ involvement status (HCC)         Encounter for screening mammogram for malignant neoplasm of breast    Orders:  •  Mammo screening bilateral w 3d and cad; Future      Depression Screening and Follow-up Plan: Patient's depression  screening was positive with a PHQ-2 score of 3. Their PHQ-9 score was 6.     Falls Plan of Care: balance, strength, and gait training instructions were provided. Recommended assistive device to help with gait and balance.       Preventive health issues were discussed with patient, and age appropriate screening tests were ordered as noted in patient's After Visit Summary. Personalized health advice and appropriate referrals for health education or preventive services given if needed, as noted in patient's After Visit Summary.    History of Present Illness     HPI   Patient Care Team:  Domingo Rosen DO as PCP - General  Riky Fortune MD as Endoscopist  CAROLYN Gonzalez as Nurse Practitioner (Nephrology)  Georgina Prince DO (Nephrology)    Review of Systems   Constitutional:  Negative for chills and fever.   HENT:  Negative for ear pain and sore throat.    Eyes:  Negative for pain and visual disturbance.   Respiratory:  Negative for cough and shortness of breath.    Cardiovascular:  Negative for chest pain and palpitations.   Gastrointestinal:  Negative for abdominal pain and vomiting.   Genitourinary:  Negative for dysuria and hematuria.   Musculoskeletal:  Negative for arthralgias and back pain.   Skin:  Negative for color change and rash.   Neurological:  Negative for seizures and syncope.   All other systems reviewed and are negative.    Medical History Reviewed by provider this encounter:       Annual Wellness Visit Questionnaire       Health Risk Assessment:   Patient rates overall health as good. Patient feels that their physical health rating is same. Eyesight was rated as much better. Hearing was rated as much better. Patient feels that their emotional and mental health rating is same. Patients states they are never, rarely angry. Patient states they are never, rarely unusually tired/fatigued. Pain experienced in the last 7 days has been none. Patient states that she has experienced no weight  loss or gain in last 6 months.     Depression Screening:   PHQ-2 Score: 3  PHQ-9 Score: 6      Fall Risk Screening:   In the past year, patient has experienced: no history of falling in past year      Urinary Incontinence Screening:   Patient has leaked urine accidently in the last six months.     Home Safety:  Patient does not have trouble with stairs inside or outside of their home. Patient and caregiver  state patient is able to ambulate stairs, but patient is very slow.     Nutrition:   Current diet is Regular.     Medications:   Patient is currently taking over-the-counter supplements. OTC medications include: see medication list. Patient is able to manage medications.     Activities of Daily Living (ADLs)/Instrumental Activities of Daily Living (IADLs):   Walk and transfer into and out of bed and chair?: Yes  Dress and groom yourself?: Yes    Bathe or shower yourself?: Yes    Feed yourself? Yes  Do your laundry/housekeeping?: Yes  Manage your money, pay your bills and track your expenses?: Yes  Make your own meals?: Yes    Do your own shopping?: Yes    Previous Hospitalizations:   Any hospitalizations or ED visits within the last 12 months?: Yes    How many hospitalizations have you had in the last year?: 1-2    Advance Care Planning:   Living will: Yes    Advanced directive: Yes      PREVENTIVE SCREENINGS      Cardiovascular Screening:    General: Screening Not Indicated and History Lipid Disorder      Diabetes Screening:     General: Screening Current      Colorectal Cancer Screening:     General: Screening Current      Breast Cancer Screening:     General: History Breast Cancer      Cervical Cancer Screening:    General: Screening Not Indicated      Lung Cancer Screening:     General: Screening Not Indicated      Hepatitis C Screening:    General: Screening Current    Screening, Brief Intervention, and Referral to Treatment (SBIRT)    Screening  Typical number of drinks in a day: 0  Typical number of  "drinks in a week: 0  Interpretation: Low risk drinking behavior.    Single Item Drug Screening:  How often have you used an illegal drug (including marijuana) or a prescription medication for non-medical reasons in the past year? never    Single Item Drug Screen Score: 0  Interpretation: Negative screen for possible drug use disorder    Social Drivers of Health     Financial Resource Strain: Low Risk  (1/5/2024)    Overall Financial Resource Strain (CARDIA)    • Difficulty of Paying Living Expenses: Not hard at all   Food Insecurity: No Food Insecurity (1/6/2025)    Hunger Vital Sign    • Worried About Running Out of Food in the Last Year: Never true    • Ran Out of Food in the Last Year: Never true   Transportation Needs: No Transportation Needs (1/6/2025)    PRAPARE - Transportation    • Lack of Transportation (Medical): No    • Lack of Transportation (Non-Medical): No   Housing Stability: Unknown (1/6/2025)    Housing Stability Vital Sign    • Unable to Pay for Housing in the Last Year: No   Utilities: Not At Risk (1/6/2025)    [facility] Utilities    • Threatened with loss of utilities: No     No results found.    Objective   /84   Pulse 76   Temp 97.5 °F (36.4 °C)   Resp 16   Ht 5' 1\" (1.549 m)   Wt 81.6 kg (180 lb)   LMP  (LMP Unknown)   BMI 34.01 kg/m²     Physical Exam  Constitutional:       Appearance: Normal appearance.   HENT:      Head: Normocephalic and atraumatic.      Right Ear: Tympanic membrane, ear canal and external ear normal.      Left Ear: Tympanic membrane, ear canal and external ear normal.      Nose: Nose normal.      Mouth/Throat:      Mouth: Mucous membranes are moist.      Pharynx: Oropharynx is clear.   Cardiovascular:      Rate and Rhythm: Normal rate and regular rhythm.      Pulses: Normal pulses.      Heart sounds: Normal heart sounds.   Pulmonary:      Effort: Pulmonary effort is normal.      Breath sounds: Normal breath sounds.   Abdominal:      General: Abdomen is flat. Bowel " sounds are normal.      Palpations: Abdomen is soft.   Musculoskeletal:         General: Normal range of motion.      Cervical back: Normal range of motion and neck supple.   Skin:     General: Skin is warm and dry.   Neurological:      General: No focal deficit present.      Mental Status: She is alert and oriented to person, place, and time.   Psychiatric:         Mood and Affect: Mood normal.         Behavior: Behavior normal.

## 2025-01-08 RX ORDER — FLUTICASONE PROPIONATE 50 MCG
SPRAY, SUSPENSION (ML) NASAL
Qty: 9.9 ML | Refills: 5 | Status: SHIPPED | OUTPATIENT
Start: 2025-01-08

## 2025-01-10 ENCOUNTER — HOSPITAL ENCOUNTER (OUTPATIENT)
Dept: MAMMOGRAPHY | Facility: HOSPITAL | Age: 70
Discharge: HOME | End: 2025-01-10
Attending: FAMILY MEDICINE
Payer: MEDICARE

## 2025-01-10 VITALS — BODY MASS INDEX: 33.99 KG/M2 | WEIGHT: 180 LBS | HEIGHT: 61 IN

## 2025-01-10 DIAGNOSIS — Z12.31 ENCOUNTER FOR SCREENING MAMMOGRAM FOR MALIGNANT NEOPLASM OF BREAST: ICD-10-CM

## 2025-01-10 DIAGNOSIS — Z85.3 HISTORY OF BREAST CANCER: ICD-10-CM

## 2025-01-10 PROCEDURE — 77063 BREAST TOMOSYNTHESIS BI: CPT

## 2025-01-10 PROCEDURE — 77067 SCR MAMMO BI INCL CAD: CPT

## 2025-01-28 DIAGNOSIS — R15.2 INCONTINENCE OF FECES WITH FECAL URGENCY: ICD-10-CM

## 2025-01-28 DIAGNOSIS — R32 URINARY INCONTINENCE, UNSPECIFIED TYPE: ICD-10-CM

## 2025-01-28 DIAGNOSIS — R15.9 INCONTINENCE OF FECES WITH FECAL URGENCY: ICD-10-CM

## 2025-01-28 RX ORDER — OXYBUTYNIN CHLORIDE 5 MG/1
5 TABLET ORAL 3 TIMES DAILY
Qty: 270 TABLET | Refills: 1 | Status: SHIPPED | OUTPATIENT
Start: 2025-01-28

## 2025-02-12 ENCOUNTER — OFFICE VISIT (OUTPATIENT)
Dept: NEUROLOGY | Facility: CLINIC | Age: 70
End: 2025-02-12
Payer: MEDICARE

## 2025-02-12 VITALS
HEART RATE: 83 BPM | TEMPERATURE: 98.1 F | HEIGHT: 61 IN | WEIGHT: 180 LBS | OXYGEN SATURATION: 97 % | DIASTOLIC BLOOD PRESSURE: 68 MMHG | BODY MASS INDEX: 33.99 KG/M2 | SYSTOLIC BLOOD PRESSURE: 148 MMHG

## 2025-02-12 DIAGNOSIS — G91.2 NPH (NORMAL PRESSURE HYDROCEPHALUS) (HCC): Primary | ICD-10-CM

## 2025-02-12 PROCEDURE — 99214 OFFICE O/P EST MOD 30 MIN: CPT | Performed by: NURSE PRACTITIONER

## 2025-02-12 NOTE — ASSESSMENT & PLAN NOTE
Patient with several year history of gradual decline in ambulation.  She also reports some urinary urgency, mild cognitive changes.  Workup did reveal ventriculomegaly on brain imaging, she does have mild neuropathy however this is not significant enough to explain her ambulatory dysfunction.  There are no myopathic features.  MRI imaging of her spine did not reveal any significant cord compression.  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  She did have a repeat lumbar puncture in which there was no change in her exam with neurosurgery.  She again did not subjectively no improvement.  Given this  shunt was not recommended in which we again discussed.  She is currently in physical therapy and which has been assisting in maintaining her current ambulatory status.  She should continue for now.  Recently started on oxybutynin by her PCP for urinary complaints. Discussed treatment currently would be supportive care.      Plan for follow-up in 6 months. To contact the office sooner with any concerns or worsening symptoms.

## 2025-02-12 NOTE — PROGRESS NOTES
Name: Radha Covington      : 1955      MRN: [id number]  Encounter Provider: CAROLYN Quispe  Encounter Date: 2025   Encounter department: NEUROLOGY [facility]  :  Assessment & Plan  NPH (normal pressure hydrocephalus) (HCC)  Patient with several year history of gradual decline in ambulation.  She also reports some urinary urgency, mild cognitive changes.  Workup did reveal ventriculomegaly on brain imaging, she does have mild neuropathy however this is not significant enough to explain her ambulatory dysfunction.  There are no myopathic features.  MRI imaging of her spine did not reveal any significant cord compression.  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  She did have a repeat lumbar puncture in which there was no change in her exam with neurosurgery.  She again did not subjectively no improvement.  Given this  shunt was not recommended in which we again discussed.  She is currently in physical therapy and which has been assisting in maintaining her current ambulatory status.  She should continue for now.  Recently started on oxybutynin by her PCP for urinary complaints. Discussed treatment currently would be supportive care.      Plan for follow-up in 6 months. To contact the office sooner with any concerns or worsening symptoms.                 History of Present Illness   HPI  Patient is a 69-year-old female with past medical history of hyperparathyroidism, lumbar radiculopathy, neuropathy, CKD, breast cancer, lupus, obesity who presents for follow-up for normal pressure hydrocephalus/ambulatory dysfunction.    Last office visit 2024 in which she was encouraged to repeat LP    Interval History:    In the past she did see neurosurgery, did have lumbar puncture performed however was not able to have post lumbar puncture evaluation as she need to go to the emergency department.  She did repeat LP in December, her exam did not have much change and  again she did not not much subjective improvement either.   No falls since last visit, she needs assistance getting out of chairs.   She remains in home PT.     Mild memory complaints but no worsening of this.   Has to wear depends as has urinary/bowel urgency and can't make it to the bathroom in time.  Was recently started on oxybutynin.            MRI brain June 2024: diffuse ventriculomegaly with some transependymal edema, with small vessel disease.  No significant cortical atrophy.     MRI cervical spine June 2024 diffuse cervical spondylosis, no significant central canal stenosis or cord compression, bilateral neuroforaminal narrowing noted at C5-6 and C6-7.     MRI lumbar spine without contrast; mild to degenerative changes, noncompressive, most most significant at L4-5 and L5-S1.  No significant canal stenosis.     EMG mild axonal sensorimotor neuropathy with chronic L5-S1 radiculopathy in the right lower extremity.     Labs: June 2024: Sed rate 43, has been mildly elevated since  2020, B12: 274, MMA normal, Spep neg, CK normal.        Sed Rate   Latest Ref Rng 0 - 29 mm/hour   10/22/2019 24 (H)    5/21/2020 20    10/6/2020 31 (H)    3/1/2021 36 (H)    12/14/2021 33 (H)    8/23/2022 38 (H)    6/16/2024 43 (H)          PRIOR hx from initial evaluation in May 2024  and Workup:  Patient reports the symptoms have been going on for at least a few years, she has had number of falls over the years.  She feels her balance is not good, she tends to stumble here and there, and on occasions food feel her legs are giving out on her.  Most recent fall was in January 2024, she fractured her left wrist.  Weakness is in both  lower extremities and more recently, notes similar weakness in the upper extremities.  Patient does have chronic neck pain, back pain for more than 20 years, she uses a cane for assistance which does help her.  She denies any change in her vision, specifically no drooping of the eyes, double vision,  blurry vision.  Speech and swallowing have been unaffected.  She denies any shortness of breath, at rest, with ADLs, or with exertion.  She does report loss of bladder and bowel control over the last few years, reports urinary urgency and frequency, and for bowels she usually feels a cramp in the lower abdomen, with sudden urgency to defecate.  She does report some numbness in her feet, denies any dysesthesias.     Does have osteoporosis, on Prolia, follows with rheumatology at [facility] for lupus/mixed connective tissue disease, currently on Plaquenil.     Besides this, has prior history of breast cancer, status postlumpectomy in 2017 and radiation, did not need any chemotherapy.  Has microscopic colitis, follows with GI, currently on budesonide and Imodium.     Labs:  March 2024 vitamin D normal 41.4.,CMP, CBC normal.  May 2023; TSH normal, C4 complement levels minimally high, C3 normal, parathormone 130, vitamin D18.6.     CT head January 2024 decreased attenuation noted in periventricular and subcortical white matter, consistent with small vessel disease, does have ventriculomegaly, slightly out of proportion to cortical atrophy.    Review of Systems  Constitutional:  Negative for appetite change, fatigue and fever.   HENT: Negative.  Negative for hearing loss, tinnitus, trouble swallowing and voice change.    Eyes: Negative.  Negative for photophobia, pain and visual disturbance.   Respiratory: Negative.  Negative for shortness of breath.    Cardiovascular: Negative.  Negative for palpitations.   Gastrointestinal: Negative.  Negative for nausea and vomiting.   Endocrine: Negative.  Negative for cold intolerance.   Genitourinary: Negative.  Negative for dysuria, frequency and urgency.   Musculoskeletal:  Positive for gait problem (balance issues- no falls). Negative for back pain, myalgias, neck pain and neck stiffness.   Skin: Negative.  Negative for rash.   Allergic/Immunologic: Negative.    Neurological:   Negative for dizziness, tremors, seizures, syncope, facial asymmetry, speech difficulty, weakness, light-headedness, numbness and headaches.   Hematological: Negative.  Does not bruise/bleed easily.   Psychiatric/Behavioral: Negative.  Negative for confusion, hallucinations and sleep disturbance.    All other systems reviewed and are negative.              I have personally reviewed the MA's review of systems and made changes as necessary.         Objective   LMP  (LMP Unknown)     Physical Exam  Constitutional:       General: She is awake.   HENT:      Right Ear: Hearing normal.      Left Ear: Hearing normal.   Eyes:      General: Lids are normal.      Extraocular Movements: Extraocular movements intact.      Pupils: Pupils are equal, round, and reactive to light.   Neurological:      Mental Status: She is alert.      Motor: Motor strength is normal.     Deep Tendon Reflexes:      Reflex Scores:       Bicep reflexes are 2+ on the right side and 2+ on the left side.       Brachioradialis reflexes are 2+ on the right side and 2+ on the left side.       Patellar reflexes are 1+ on the right side and 1+ on the left side.  Psychiatric:         Speech: Speech normal.       Neurological Exam  Mental Status  Awake and alert. Speech is normal. Language is fluent with no aphasia.    Cranial Nerves  CN III, IV, VI: Extraocular movements intact bilaterally. Normal lids and orbits bilaterally. Pupils equal round and reactive to light bilaterally.  CN V:  Right: Facial sensation is normal.  Left: Facial sensation is normal on the left.  CN VII:  Right: There is no facial weakness.  Left: There is no facial weakness.  CN VIII:  Right: Hearing is normal.  Left: Hearing is normal.  CN IX, X: Palate elevates symmetrically  CN XI:  Right: Trapezius strength is normal.  Left: Trapezius strength is normal.  CN XII: Tongue midline without atrophy or fasciculations.    Motor   Strength is 5/5 throughout all four extremities.  Strength:  No pronator drift,  strength equal bilaterally.    Sensory  Light touch is normal in upper and lower extremities. Temperature abnormality: Diminished to the ankles.     Reflexes                                            Right                      Left  Brachioradialis                    2+                         2+  Biceps                                 2+                         2+  Patellar                                1+                         1+  Achilles                                Tr                         Tr    Coordination  Right: Finger-to-nose normal.Left: Finger-to-nose normal.  No bradykinesia, cogwheeling, or rigidity    .    Gait    Presented in wheelchair, not ambulated.

## 2025-02-12 NOTE — PROGRESS NOTES
Review of Systems   Constitutional:  Negative for appetite change, fatigue and fever.   HENT: Negative.  Negative for hearing loss, tinnitus, trouble swallowing and voice change.    Eyes: Negative.  Negative for photophobia, pain and visual disturbance.   Respiratory: Negative.  Negative for shortness of breath.    Cardiovascular: Negative.  Negative for palpitations.   Gastrointestinal: Negative.  Negative for nausea and vomiting.   Endocrine: Negative.  Negative for cold intolerance.   Genitourinary: Negative.  Negative for dysuria, frequency and urgency.   Musculoskeletal:  Positive for gait problem (balance issues- no falls). Negative for back pain, myalgias, neck pain and neck stiffness.   Skin: Negative.  Negative for rash.   Allergic/Immunologic: Negative.    Neurological:  Negative for dizziness, tremors, seizures, syncope, facial asymmetry, speech difficulty, weakness, light-headedness, numbness and headaches.   Hematological: Negative.  Does not bruise/bleed easily.   Psychiatric/Behavioral: Negative.  Negative for confusion, hallucinations and sleep disturbance.    All other systems reviewed and are negative.

## 2025-05-04 ENCOUNTER — APPOINTMENT (OUTPATIENT)
Dept: LAB | Facility: HOSPITAL | Age: 70
End: 2025-05-04
Payer: MEDICARE

## 2025-05-04 DIAGNOSIS — N18.30 STAGE 3 CHRONIC KIDNEY DISEASE, UNSPECIFIED WHETHER STAGE 3A OR 3B CKD (HCC): ICD-10-CM

## 2025-05-04 DIAGNOSIS — M32.9 SYSTEMIC LUPUS ERYTHEMATOSUS, UNSPECIFIED SLE TYPE, UNSPECIFIED ORGAN INVOLVEMENT STATUS (HCC): ICD-10-CM

## 2025-05-04 LAB
ALBUMIN SERPL BCG-MCNC: 4.1 G/DL (ref 3.5–5)
ALP SERPL-CCNC: 48 U/L (ref 34–104)
ALT SERPL W P-5'-P-CCNC: 12 U/L (ref 7–52)
ANION GAP SERPL CALCULATED.3IONS-SCNC: 11 MMOL/L (ref 4–13)
AST SERPL W P-5'-P-CCNC: 14 U/L (ref 13–39)
BACTERIA UR QL AUTO: ABNORMAL /HPF
BASOPHILS # BLD AUTO: 0.03 THOUSANDS/ÂΜL (ref 0–0.1)
BASOPHILS NFR BLD AUTO: 1 % (ref 0–1)
BILIRUB SERPL-MCNC: 1.23 MG/DL (ref 0.2–1)
BILIRUB UR QL STRIP: NEGATIVE
BUN SERPL-MCNC: 16 MG/DL (ref 5–25)
CALCIUM SERPL-MCNC: 9.1 MG/DL (ref 8.4–10.2)
CHLORIDE SERPL-SCNC: 103 MMOL/L (ref 96–108)
CLARITY UR: ABNORMAL
CO2 SERPL-SCNC: 27 MMOL/L (ref 21–32)
COLOR UR: YELLOW
CREAT SERPL-MCNC: 1.02 MG/DL (ref 0.6–1.3)
CREAT UR-MCNC: 151.4 MG/DL
EOSINOPHIL # BLD AUTO: 0.15 THOUSAND/ÂΜL (ref 0–0.61)
EOSINOPHIL NFR BLD AUTO: 3 % (ref 0–6)
ERYTHROCYTE [DISTWIDTH] IN BLOOD BY AUTOMATED COUNT: 13.3 % (ref 11.6–15.1)
GFR SERPL CREATININE-BSD FRML MDRD: 55 ML/MIN/1.73SQ M
GLUCOSE P FAST SERPL-MCNC: 142 MG/DL (ref 65–99)
GLUCOSE UR STRIP-MCNC: NEGATIVE MG/DL
HCT VFR BLD AUTO: 43.5 % (ref 34.8–46.1)
HGB BLD-MCNC: 13.9 G/DL (ref 11.5–15.4)
HGB UR QL STRIP.AUTO: ABNORMAL
IMM GRANULOCYTES # BLD AUTO: 0.02 THOUSAND/UL (ref 0–0.2)
IMM GRANULOCYTES NFR BLD AUTO: 0 % (ref 0–2)
KETONES UR STRIP-MCNC: NEGATIVE MG/DL
LEUKOCYTE ESTERASE UR QL STRIP: NEGATIVE
LYMPHOCYTES # BLD AUTO: 1.36 THOUSANDS/ÂΜL (ref 0.6–4.47)
LYMPHOCYTES NFR BLD AUTO: 24 % (ref 14–44)
MCH RBC QN AUTO: 29.3 PG (ref 26.8–34.3)
MCHC RBC AUTO-ENTMCNC: 32 G/DL (ref 31.4–37.4)
MCV RBC AUTO: 92 FL (ref 82–98)
MICROALBUMIN UR-MCNC: 11.7 MG/L
MICROALBUMIN/CREAT 24H UR: 8 MG/G CREATININE (ref 0–30)
MONOCYTES # BLD AUTO: 0.51 THOUSAND/ÂΜL (ref 0.17–1.22)
MONOCYTES NFR BLD AUTO: 9 % (ref 4–12)
MUCOUS THREADS UR QL AUTO: ABNORMAL
NEUTROPHILS # BLD AUTO: 3.64 THOUSANDS/ÂΜL (ref 1.85–7.62)
NEUTS SEG NFR BLD AUTO: 63 % (ref 43–75)
NITRITE UR QL STRIP: NEGATIVE
NON-SQ EPI CELLS URNS QL MICRO: ABNORMAL /HPF
NRBC BLD AUTO-RTO: 0 /100 WBCS
PH UR STRIP.AUTO: 6 [PH]
PLATELET # BLD AUTO: 324 THOUSANDS/UL (ref 149–390)
PMV BLD AUTO: 9 FL (ref 8.9–12.7)
POTASSIUM SERPL-SCNC: 3.6 MMOL/L (ref 3.5–5.3)
PROT SERPL-MCNC: 7.2 G/DL (ref 6.4–8.4)
PROT UR STRIP-MCNC: NEGATIVE MG/DL
PTH-INTACT SERPL-MCNC: 132.4 PG/ML (ref 12–88)
RBC # BLD AUTO: 4.74 MILLION/UL (ref 3.81–5.12)
RBC #/AREA URNS AUTO: ABNORMAL /HPF
SODIUM SERPL-SCNC: 141 MMOL/L (ref 135–147)
SP GR UR STRIP.AUTO: 1.02 (ref 1–1.03)
UROBILINOGEN UR STRIP-ACNC: <2 MG/DL
WBC # BLD AUTO: 5.71 THOUSAND/UL (ref 4.31–10.16)
WBC #/AREA URNS AUTO: ABNORMAL /HPF

## 2025-05-04 PROCEDURE — 85025 COMPLETE CBC W/AUTO DIFF WBC: CPT

## 2025-05-04 PROCEDURE — 80053 COMPREHEN METABOLIC PANEL: CPT

## 2025-05-04 PROCEDURE — 81001 URINALYSIS AUTO W/SCOPE: CPT

## 2025-05-04 PROCEDURE — 82043 UR ALBUMIN QUANTITATIVE: CPT

## 2025-05-04 PROCEDURE — 82570 ASSAY OF URINE CREATININE: CPT

## 2025-05-04 PROCEDURE — 36415 COLL VENOUS BLD VENIPUNCTURE: CPT

## 2025-05-04 PROCEDURE — 83970 ASSAY OF PARATHORMONE: CPT

## 2025-05-05 ENCOUNTER — RESULTS FOLLOW-UP (OUTPATIENT)
Dept: NEPHROLOGY | Facility: CLINIC | Age: 70
End: 2025-05-05

## 2025-05-05 NOTE — TELEPHONE ENCOUNTER
I called and left a VM for Radha to call the office back to discuss the following:    ----- Message from Gilbert Huitron PA-C sent at 5/5/2025  3:31 PM EDT -----  Labs reviewed, kidney function at 55%, will be discussed at visit with nephro

## 2025-05-12 NOTE — ASSESSMENT & PLAN NOTE
Vitamin D 41.4 mg/dL.   Continue cholecalciferol 5000 units daily.Orders:    Vitamin D 25 hydroxy; Future

## 2025-05-12 NOTE — ASSESSMENT & PLAN NOTE
Continue to encourage healthy lifestyle choices including diet and exercise, patient is wheelchair bound.

## 2025-05-12 NOTE — PROGRESS NOTES
Name: Radha Covington      : 1955      MRN: [id number]  Encounter Provider: CAROLYN Shrestha  Encounter Date: 2025   Encounter department: [facility]  :  Assessment & Plan  Stage 3a chronic kidney disease (HCC)  Lab Results   Component Value Date    EGFR 55 2025    EGFR 53 2024    EGFR 62 2024    CREATININE 1.02 2025    CREATININE 1.06 2024    CREATININE 0.93 2024   Baseline creatinine 0.9-1.1 mg/dL  Follows with Dr. Prince  Creatinine 1.02 mg/dL, GFR 55 ml/min  Etiology age related eGFR loss and potential interstitial disease. By history, no evidence of lupus nephritis.  UA small blood, 1-2 RBC, 4-10 WBC, moderate bacteria, occasional mucus, UACR 8 mg/g, 2025.  CT CAP with stable bilateral simple parapelvic cysts, no hydronephrosis, 2024  Volume status examines euvolemic to mildly hypervolemic  Continue to avoid NSAIDs and nephrotoxins. Stay well hydrated.   No current indication for RAAS or SGLT2.Orders:    Basic metabolic panel; Future    CBC and differential; Future    Secondary hyperparathyroidism (HCC)  .4 pg/mL, 25. Phosphorus 3.7 mg/dL 12/3/23.  No current indication for activated vitamin D agent. Orders:    Phosphorus; Future    PTH, intact; Future    Vitamin D deficiency  Vitamin D 41.4 mg/dL.   Continue cholecalciferol 5000 units daily.Orders:    Vitamin D 25 hydroxy; Future    Obesity, morbid (HCC)  Continue to encourage healthy lifestyle choices including diet and exercise, patient is wheelchair bound.       History of Present Illness   HPI  Radha Covington is a 69 y.o. female who presents to [place] office for follow-up of CKD stage IIIa, secondary hyperparathyroidism, vitamin D deficiency.  PMH includes obesity, lumbosacral radiculopathy, normal pressure hydrocephalus, microscopic hematuria, frequent falls, breast cancer and generalized muscle weakness.  Denies recent  hospitalizations, emergency department visits or illness.  States overall she has been feeling well.        Review of Systems   Constitutional:  Positive for fatigue. Negative for activity change, appetite change, chills and fever.   HENT:  Negative for ear pain and sore throat.    Eyes:  Negative for pain and visual disturbance.   Respiratory:  Negative for cough and shortness of breath.    Cardiovascular:  Negative for chest pain and palpitations.   Gastrointestinal:  Negative for abdominal pain, constipation, diarrhea, nausea and vomiting.        Occasional diarrhea with urinary and bowel incontinence   Endocrine: Negative.    Genitourinary:  Negative for decreased urine volume, difficulty urinating, dysuria and hematuria.   Musculoskeletal:  Negative for arthralgias and back pain.   Skin:  Negative for color change and rash.   Neurological:  Negative for dizziness, seizures, syncope, weakness, light-headedness and headaches.   Hematological: Negative.    Psychiatric/Behavioral: Negative.     All other systems reviewed and are negative.         Objective   LMP  (LMP Unknown)      Physical Exam  Vitals and nursing note reviewed.   Constitutional:       General: She is not in acute distress.     Appearance: She is well-developed. She is obese.   HENT:      Head: Normocephalic and atraumatic.      Right Ear: External ear normal.      Left Ear: External ear normal.      Nose: Nose normal.      Mouth/Throat:      Mouth: Mucous membranes are moist.      Pharynx: Oropharynx is clear.   Eyes:      Extraocular Movements: Extraocular movements intact.      Conjunctiva/sclera: Conjunctivae normal.      Pupils: Pupils are equal, round, and reactive to light.   Cardiovascular:      Rate and Rhythm: Normal rate and regular rhythm.      Heart sounds: Normal heart sounds. No murmur heard.  Pulmonary:      Effort: Pulmonary effort is normal. No respiratory distress.      Breath sounds: Normal breath sounds.   Abdominal:       Palpations: Abdomen is soft.      Tenderness: There is no abdominal tenderness.   Musculoskeletal:         General: No swelling.      Cervical back: Neck supple.      Right lower leg: Edema present.      Left lower leg: Edema present.      Comments: Trace BLE edema   Skin:     General: Skin is warm and dry.      Capillary Refill: Capillary refill takes less than 2 seconds.   Neurological:      General: No focal deficit present.      Mental Status: She is alert and oriented to person, place, and time.   Psychiatric:         Mood and Affect: Mood normal.         Behavior: Behavior normal.

## 2025-05-12 NOTE — ASSESSMENT & PLAN NOTE
Lab Results   Component Value Date    EGFR 55 05/04/2025    EGFR 53 11/03/2024    EGFR 62 03/18/2024    CREATININE 1.02 05/04/2025    CREATININE 1.06 11/03/2024    CREATININE 0.93 03/18/2024   Baseline creatinine 0.9-1.1 mg/dL  Follows with Dr. Prince  Creatinine 1.02 mg/dL, GFR 55 ml/min  Etiology age related eGFR loss and potential interstitial disease. By history, no evidence of lupus nephritis.  UA small blood, 1-2 RBC, 4-10 WBC, moderate bacteria, occasional mucus, UACR 8 mg/g, 5/4/2025.  CT CAP with stable bilateral simple parapelvic cysts, no hydronephrosis, 1/17/2024  Volume status examines euvolemic to mildly hypervolemic  Continue to avoid NSAIDs and nephrotoxins. Stay well hydrated.   No current indication for RAAS or SGLT2.Orders:    Basic metabolic panel; Future    CBC and differential; Future

## 2025-05-12 NOTE — ASSESSMENT & PLAN NOTE
.4 pg/mL, 5/4/25. Phosphorus 3.7 mg/dL 12/3/23.  No current indication for activated vitamin D agent. Orders:    Phosphorus; Future    PTH, intact; Future

## 2025-05-13 ENCOUNTER — OFFICE VISIT (OUTPATIENT)
Dept: NEPHROLOGY | Facility: CLINIC | Age: 70
End: 2025-05-13
Payer: MEDICARE

## 2025-05-13 VITALS
OXYGEN SATURATION: 94 % | HEIGHT: 61 IN | BODY MASS INDEX: 33.99 KG/M2 | TEMPERATURE: 98.2 F | WEIGHT: 180 LBS | SYSTOLIC BLOOD PRESSURE: 110 MMHG | HEART RATE: 98 BPM | RESPIRATION RATE: 16 BRPM | DIASTOLIC BLOOD PRESSURE: 75 MMHG

## 2025-05-13 DIAGNOSIS — R80.1 PERSISTENT PROTEINURIA: ICD-10-CM

## 2025-05-13 DIAGNOSIS — F32.A DEPRESSION, UNSPECIFIED DEPRESSION TYPE: ICD-10-CM

## 2025-05-13 DIAGNOSIS — E66.01 OBESITY, MORBID (HCC): ICD-10-CM

## 2025-05-13 DIAGNOSIS — N18.31 STAGE 3A CHRONIC KIDNEY DISEASE (HCC): Primary | ICD-10-CM

## 2025-05-13 DIAGNOSIS — N25.81 SECONDARY HYPERPARATHYROIDISM (HCC): ICD-10-CM

## 2025-05-13 DIAGNOSIS — E83.42 HYPOMAGNESEMIA: ICD-10-CM

## 2025-05-13 DIAGNOSIS — E55.9 VITAMIN D DEFICIENCY: ICD-10-CM

## 2025-05-13 PROCEDURE — 99214 OFFICE O/P EST MOD 30 MIN: CPT

## 2025-05-13 RX ORDER — DULOXETIN HYDROCHLORIDE 60 MG/1
60 CAPSULE, DELAYED RELEASE ORAL DAILY
Qty: 90 CAPSULE | Refills: 1 | Status: SHIPPED | OUTPATIENT
Start: 2025-05-13

## 2025-05-13 RX ORDER — DULOXETIN HYDROCHLORIDE 30 MG/1
60 CAPSULE, DELAYED RELEASE ORAL DAILY
Qty: 90 CAPSULE | Refills: 3 | Status: SHIPPED | OUTPATIENT
Start: 2025-05-13 | End: 2025-05-13

## 2025-05-13 NOTE — PATIENT INSTRUCTIONS
Pleasure seeing you today.     Your kidney function is stable with a creatinine of 1.02 mg/dL and a GFR of 55 mL/min.  Please continue to avoid NSAIDs such as Advil, Motrin, Aleve, ibuprofen and naproxen.  Please continue to follow 2 g sodium restriction.    Please continue checking blood pressure at home 3-4 times per week and keep a record of readings.  If blood pressure upper number is consistently less than 100 or greater than 150 please contact the office.  Please also call the office if you are experiencing increased headaches, dizziness, lightheadedness, chest pain or blurred vision.  Please continue to maintain a 2 g sodium restriction.    Please continue taking all medications as previously ordered.    Please return for follow-up appointment in 6 months with lab work completed prior to that visit.

## 2025-05-26 DIAGNOSIS — E78.5 HYPERLIPIDEMIA, UNSPECIFIED HYPERLIPIDEMIA TYPE: ICD-10-CM

## 2025-05-27 RX ORDER — EZETIMIBE 10 MG/1
10 TABLET ORAL DAILY
Qty: 90 TABLET | Refills: 0 | Status: SHIPPED | OUTPATIENT
Start: 2025-05-27

## 2025-06-18 ENCOUNTER — TELEPHONE (OUTPATIENT)
Age: 70
End: 2025-06-18

## 2025-06-18 NOTE — TELEPHONE ENCOUNTER
Areli from [facility] called inquiring about orders for physical therapy that were faxed to office on 6/16/25.  Spoke to Jeimy at office, no orders received.  Fax number provided to Areli, he will be re-faxing orders to be signed and returned.    Areli can be reached at [phone]    [facility] fax # [phone]

## 2025-06-18 NOTE — TELEPHONE ENCOUNTER
Fax received from [facility] for Therapy Evaluation Request, form completed and faxed back to [facility] @ [phone].

## 2025-07-01 ENCOUNTER — TELEPHONE (OUTPATIENT)
Dept: NEUROLOGY | Facility: CLINIC | Age: 70
End: 2025-07-01

## 2025-07-01 NOTE — TELEPHONE ENCOUNTER
LVMM for patient regarding rescheduling her appointment on 9/10/25 with CAROLYN Quispe as the provider will be leaving the practice.    Letter placed in the mail to patient.

## 2025-07-07 ENCOUNTER — OFFICE VISIT (OUTPATIENT)
Dept: FAMILY MEDICINE CLINIC | Facility: CLINIC | Age: 70
End: 2025-07-07
Payer: MEDICARE

## 2025-07-07 VITALS
DIASTOLIC BLOOD PRESSURE: 76 MMHG | TEMPERATURE: 98.1 F | WEIGHT: 186 LBS | RESPIRATION RATE: 16 BRPM | BODY MASS INDEX: 35.12 KG/M2 | HEART RATE: 84 BPM | HEIGHT: 61 IN | SYSTOLIC BLOOD PRESSURE: 134 MMHG

## 2025-07-07 DIAGNOSIS — R26.89 BALANCE PROBLEM: ICD-10-CM

## 2025-07-07 DIAGNOSIS — N18.31 STAGE 3A CHRONIC KIDNEY DISEASE (HCC): ICD-10-CM

## 2025-07-07 DIAGNOSIS — G91.2 NORMAL PRESSURE HYDROCEPHALUS (HCC): ICD-10-CM

## 2025-07-07 DIAGNOSIS — F32.A DEPRESSION, UNSPECIFIED DEPRESSION TYPE: ICD-10-CM

## 2025-07-07 DIAGNOSIS — R27.0 ATAXIA: ICD-10-CM

## 2025-07-07 DIAGNOSIS — C50.919 MALIGNANT NEOPLASM OF FEMALE BREAST, UNSPECIFIED ESTROGEN RECEPTOR STATUS, UNSPECIFIED LATERALITY, UNSPECIFIED SITE OF BREAST (HCC): ICD-10-CM

## 2025-07-07 DIAGNOSIS — H69.93 EUSTACHIAN TUBE DISORDER, BILATERAL: Primary | ICD-10-CM

## 2025-07-07 DIAGNOSIS — M32.9 SYSTEMIC LUPUS ERYTHEMATOSUS, UNSPECIFIED SLE TYPE, UNSPECIFIED ORGAN INVOLVEMENT STATUS (HCC): ICD-10-CM

## 2025-07-07 PROCEDURE — 99214 OFFICE O/P EST MOD 30 MIN: CPT | Performed by: FAMILY MEDICINE

## 2025-07-07 PROCEDURE — G2211 COMPLEX E/M VISIT ADD ON: HCPCS | Performed by: FAMILY MEDICINE

## 2025-07-07 NOTE — PROGRESS NOTES
Name: Radha Covington      : 1955      MRN: [id number]  Encounter Provider: Domingo Rosen DO  Encounter Date: 2025   [facility]  :  Assessment & Plan  Eustachian tube disorder, bilateral  Patient is using Flonase currently will refer to ENT for evaluation and treatment  Orders:  •  Ambulatory Referral to Otolaryngology; Future    Normal pressure hydrocephalus (HCC)  The patient is followed by neurology       Ataxia  Secondary to above       Balance problem  Secondary to above       Stage 3a chronic kidney disease (HCC)  Lab Results   Component Value Date    EGFR 55 2025    EGFR 53 2024    EGFR 62 2024    CREATININE 1.02 2025    CREATININE 1.06 2024    CREATININE 0.93 2024   Currently stable         Systemic lupus erythematosus, unspecified SLE type, unspecified organ involvement status (HCC)  Again followed by rheumatology       Depression, unspecified depression type  Cymbalta 60 mg is effective therapy         Malignant neoplasm of female breast, unspecified estrogen receptor status, unspecified laterality, unspecified site of breast (HCC)                History of Present Illness   Patient presents for 6-month checkup accompanied by her  using a walker.  The patient was recently diagnosed with normal pressure hydrocephalus      Review of Systems   Constitutional:  Negative for chills and fever.   HENT:  Negative for ear pain and sore throat.    Eyes:  Negative for pain and visual disturbance.   Respiratory:  Negative for cough and shortness of breath.    Cardiovascular:  Negative for chest pain and palpitations.   Gastrointestinal:  Negative for abdominal pain and vomiting.   Genitourinary:  Negative for dysuria and hematuria.   Musculoskeletal:  Negative for arthralgias and back pain.   Skin:  Negative for color change and rash.   Neurological:  Negative for seizures and syncope.   All other systems  "reviewed and are negative.      Objective   /76   Pulse 84   Temp 98.1 °F (36.7 °C)   Resp 16   Ht 5' 1\" (1.549 m)   Wt 84.4 kg (186 lb)   LMP  (LMP Unknown)   BMI 35.14 kg/m²      Physical Exam  Constitutional:       Appearance: Normal appearance.   HENT:      Head: Normocephalic and atraumatic.      Right Ear: Tympanic membrane, ear canal and external ear normal.      Left Ear: Tympanic membrane, ear canal and external ear normal.      Nose: Nose normal.      Mouth/Throat:      Mouth: Mucous membranes are moist.      Pharynx: Oropharynx is clear.     Eyes:      Extraocular Movements: Extraocular movements intact.      Conjunctiva/sclera: Conjunctivae normal.      Pupils: Pupils are equal, round, and reactive to light.       Cardiovascular:      Rate and Rhythm: Normal rate and regular rhythm.      Pulses: Normal pulses.      Heart sounds: Normal heart sounds.   Pulmonary:      Effort: Pulmonary effort is normal.      Breath sounds: Normal breath sounds.   Abdominal:      General: Abdomen is flat. Bowel sounds are normal.      Palpations: Abdomen is soft.     Musculoskeletal:         General: Normal range of motion.      Cervical back: Normal range of motion and neck supple.     Skin:     General: Skin is warm and dry.     Neurological:      General: No focal deficit present.      Mental Status: She is alert and oriented to person, place, and time.     Psychiatric:         Mood and Affect: Mood normal.         Behavior: Behavior normal.         "

## 2025-07-07 NOTE — ASSESSMENT & PLAN NOTE
Lab Results   Component Value Date    EGFR 55 05/04/2025    EGFR 53 11/03/2024    EGFR 62 03/18/2024    CREATININE 1.02 05/04/2025    CREATININE 1.06 11/03/2024    CREATININE 0.93 03/18/2024   Currently stable

## (undated) DEVICE — TUBING SUCTION 5MM X 12 FT

## (undated) DEVICE — 2000CC GUARDIAN II: Brand: GUARDIAN

## (undated) DEVICE — LUBRICANT SURGILUBE TUBE 4 OZ  FLIP TOP